# Patient Record
Sex: MALE | Race: WHITE | NOT HISPANIC OR LATINO | Employment: OTHER | URBAN - METROPOLITAN AREA
[De-identification: names, ages, dates, MRNs, and addresses within clinical notes are randomized per-mention and may not be internally consistent; named-entity substitution may affect disease eponyms.]

---

## 2021-01-01 ENCOUNTER — HOSPITAL ENCOUNTER (OUTPATIENT)
Facility: HOSPITAL | Age: 51
Discharge: HOME | End: 2021-07-29
Attending: EMERGENCY MEDICINE | Admitting: INTERNAL MEDICINE

## 2021-01-01 ENCOUNTER — OFFICE VISIT (OUTPATIENT)
Dept: GASTROENTEROLOGY | Facility: CLINIC | Age: 51
End: 2021-01-01

## 2021-01-01 ENCOUNTER — ANESTHESIA (OUTPATIENT)
Dept: GASTROENTEROLOGY | Facility: HOSPITAL | Age: 51
End: 2021-01-01

## 2021-01-01 ENCOUNTER — OFFICE VISIT (OUTPATIENT)
Dept: CARDIOLOGY | Facility: CLINIC | Age: 51
End: 2021-01-01

## 2021-01-01 ENCOUNTER — APPOINTMENT (OUTPATIENT)
Dept: CT IMAGING | Facility: HOSPITAL | Age: 51
End: 2021-01-01

## 2021-01-01 ENCOUNTER — APPOINTMENT (OUTPATIENT)
Dept: GENERAL RADIOLOGY | Facility: HOSPITAL | Age: 51
End: 2021-01-01

## 2021-01-01 ENCOUNTER — ANESTHESIA EVENT (OUTPATIENT)
Dept: GASTROENTEROLOGY | Facility: HOSPITAL | Age: 51
End: 2021-01-01

## 2021-01-01 ENCOUNTER — OUTSIDE FACILITY SERVICE (OUTPATIENT)
Dept: GASTROENTEROLOGY | Facility: CLINIC | Age: 51
End: 2021-01-01

## 2021-01-01 ENCOUNTER — TELEPHONE (OUTPATIENT)
Dept: GASTROENTEROLOGY | Facility: CLINIC | Age: 51
End: 2021-01-01

## 2021-01-01 ENCOUNTER — TELEPHONE (OUTPATIENT)
Dept: CARDIOLOGY | Facility: CLINIC | Age: 51
End: 2021-01-01

## 2021-01-01 ENCOUNTER — HOSPITAL ENCOUNTER (EMERGENCY)
Facility: HOSPITAL | Age: 51
Discharge: LEFT BEFORE DISCHARGE | End: 2021-08-10

## 2021-01-01 VITALS
HEART RATE: 95 BPM | OXYGEN SATURATION: 87 % | DIASTOLIC BLOOD PRESSURE: 87 MMHG | SYSTOLIC BLOOD PRESSURE: 122 MMHG | HEIGHT: 67 IN | BODY MASS INDEX: 27.47 KG/M2 | RESPIRATION RATE: 18 BRPM | TEMPERATURE: 99.3 F | WEIGHT: 175 LBS

## 2021-01-01 VITALS
RESPIRATION RATE: 12 BRPM | OXYGEN SATURATION: 99 % | DIASTOLIC BLOOD PRESSURE: 70 MMHG | BODY MASS INDEX: 28.88 KG/M2 | HEIGHT: 67 IN | WEIGHT: 184 LBS | SYSTOLIC BLOOD PRESSURE: 117 MMHG

## 2021-01-01 VITALS
TEMPERATURE: 99 F | BODY MASS INDEX: 27.47 KG/M2 | RESPIRATION RATE: 12 BRPM | HEART RATE: 86 BPM | WEIGHT: 175 LBS | SYSTOLIC BLOOD PRESSURE: 124 MMHG | OXYGEN SATURATION: 99 % | HEIGHT: 67 IN | DIASTOLIC BLOOD PRESSURE: 78 MMHG

## 2021-01-01 VITALS
SYSTOLIC BLOOD PRESSURE: 122 MMHG | WEIGHT: 185.4 LBS | BODY MASS INDEX: 29.1 KG/M2 | HEART RATE: 90 BPM | OXYGEN SATURATION: 98 % | HEIGHT: 67 IN | DIASTOLIC BLOOD PRESSURE: 66 MMHG | TEMPERATURE: 97.7 F

## 2021-01-01 VITALS
RESPIRATION RATE: 16 BRPM | DIASTOLIC BLOOD PRESSURE: 69 MMHG | TEMPERATURE: 98.8 F | BODY MASS INDEX: 27.15 KG/M2 | HEIGHT: 67 IN | SYSTOLIC BLOOD PRESSURE: 119 MMHG | HEART RATE: 72 BPM | WEIGHT: 173 LBS | OXYGEN SATURATION: 93 %

## 2021-01-01 DIAGNOSIS — E78.5 HYPERLIPIDEMIA LDL GOAL <70: ICD-10-CM

## 2021-01-01 DIAGNOSIS — K29.80 DUODENITIS: ICD-10-CM

## 2021-01-01 DIAGNOSIS — Z12.11 SCREEN FOR COLON CANCER: ICD-10-CM

## 2021-01-01 DIAGNOSIS — R11.2 INTRACTABLE NAUSEA AND VOMITING: ICD-10-CM

## 2021-01-01 DIAGNOSIS — R10.9 INTRACTABLE ABDOMINAL PAIN: ICD-10-CM

## 2021-01-01 DIAGNOSIS — K27.9 PUD (PEPTIC ULCER DISEASE): Primary | ICD-10-CM

## 2021-01-01 DIAGNOSIS — Z12.11 ENCOUNTER FOR SCREENING COLONOSCOPY: ICD-10-CM

## 2021-01-01 DIAGNOSIS — Z72.0 TOBACCO USE: ICD-10-CM

## 2021-01-01 DIAGNOSIS — E11.9 TYPE 2 DIABETES MELLITUS WITHOUT COMPLICATION, WITHOUT LONG-TERM CURRENT USE OF INSULIN (HCC): ICD-10-CM

## 2021-01-01 DIAGNOSIS — I73.9 PVD (PERIPHERAL VASCULAR DISEASE) WITH CLAUDICATION (HCC): ICD-10-CM

## 2021-01-01 DIAGNOSIS — R07.9 CHEST PAIN AT REST: ICD-10-CM

## 2021-01-01 DIAGNOSIS — I73.9 PERIPHERAL VASCULAR DISEASE OF EXTREMITY WITH CLAUDICATION (HCC): Primary | ICD-10-CM

## 2021-01-01 DIAGNOSIS — R73.9 HYPERGLYCEMIA: ICD-10-CM

## 2021-01-01 DIAGNOSIS — I10 ESSENTIAL HYPERTENSION: Primary | ICD-10-CM

## 2021-01-01 DIAGNOSIS — K59.04 CHRONIC IDIOPATHIC CONSTIPATION: ICD-10-CM

## 2021-01-01 DIAGNOSIS — R10.9 INTRACTABLE ABDOMINAL PAIN: Primary | ICD-10-CM

## 2021-01-01 DIAGNOSIS — Z12.11 SCREENING FOR COLON CANCER: Primary | ICD-10-CM

## 2021-01-01 DIAGNOSIS — I10 ESSENTIAL HYPERTENSION: ICD-10-CM

## 2021-01-01 LAB
ALBUMIN SERPL-MCNC: 3.2 G/DL (ref 3.5–5.2)
ALBUMIN SERPL-MCNC: 4.2 G/DL (ref 3.5–5.2)
ALBUMIN SERPL-MCNC: 4.2 G/DL (ref 3.5–5.2)
ALBUMIN/GLOB SERPL: 1.2 G/DL
ALBUMIN/GLOB SERPL: 1.3 G/DL
ALBUMIN/GLOB SERPL: 1.4 G/DL
ALP SERPL-CCNC: 129 U/L (ref 39–117)
ALP SERPL-CCNC: 136 U/L (ref 39–117)
ALP SERPL-CCNC: 169 U/L (ref 39–117)
ALT SERPL W P-5'-P-CCNC: 18 U/L (ref 1–41)
ALT SERPL W P-5'-P-CCNC: 25 U/L (ref 1–41)
ALT SERPL W P-5'-P-CCNC: 27 U/L (ref 1–41)
AMPHET+METHAMPHET UR QL: NEGATIVE
AMPHETAMINES UR QL: NEGATIVE
ANION GAP SERPL CALCULATED.3IONS-SCNC: 14 MMOL/L (ref 5–15)
ANION GAP SERPL CALCULATED.3IONS-SCNC: 17 MMOL/L (ref 5–15)
ANION GAP SERPL CALCULATED.3IONS-SCNC: 8 MMOL/L (ref 5–15)
ANION GAP SERPL CALCULATED.3IONS-SCNC: 9 MMOL/L (ref 5–15)
AST SERPL-CCNC: 21 U/L (ref 1–40)
AST SERPL-CCNC: 23 U/L (ref 1–40)
AST SERPL-CCNC: 36 U/L (ref 1–40)
B-OH-BUTYR SERPL-SCNC: 1.93 MMOL/L (ref 0.02–0.27)
BACTERIA UR QL AUTO: NORMAL /HPF
BARBITURATES UR QL SCN: NEGATIVE
BASOPHILS # BLD AUTO: 0.01 10*3/MM3 (ref 0–0.2)
BASOPHILS # BLD AUTO: 0.02 10*3/MM3 (ref 0–0.2)
BASOPHILS # BLD AUTO: 0.02 10*3/MM3 (ref 0–0.2)
BASOPHILS NFR BLD AUTO: 0.1 % (ref 0–1.5)
BASOPHILS NFR BLD AUTO: 0.2 % (ref 0–1.5)
BASOPHILS NFR BLD AUTO: 0.2 % (ref 0–1.5)
BENZODIAZ UR QL SCN: NEGATIVE
BILIRUB SERPL-MCNC: 0.4 MG/DL (ref 0–1.2)
BILIRUB SERPL-MCNC: 0.5 MG/DL (ref 0–1.2)
BILIRUB SERPL-MCNC: 0.7 MG/DL (ref 0–1.2)
BILIRUB UR QL STRIP: NEGATIVE
BUN SERPL-MCNC: 10 MG/DL (ref 6–20)
BUN SERPL-MCNC: 11 MG/DL (ref 6–20)
BUN SERPL-MCNC: 19 MG/DL (ref 6–20)
BUN SERPL-MCNC: 8 MG/DL (ref 6–20)
BUN/CREAT SERPL: 14.1 (ref 7–25)
BUN/CREAT SERPL: 16.3 (ref 7–25)
BUN/CREAT SERPL: 19.6 (ref 7–25)
BUN/CREAT SERPL: 29.7 (ref 7–25)
BUPRENORPHINE SERPL-MCNC: NEGATIVE NG/ML
CALCIUM SPEC-SCNC: 8 MG/DL (ref 8.6–10.5)
CALCIUM SPEC-SCNC: 8.3 MG/DL (ref 8.6–10.5)
CALCIUM SPEC-SCNC: 9.1 MG/DL (ref 8.6–10.5)
CALCIUM SPEC-SCNC: 9.1 MG/DL (ref 8.6–10.5)
CANNABINOIDS SERPL QL: NEGATIVE
CHLORIDE SERPL-SCNC: 102 MMOL/L (ref 98–107)
CHLORIDE SERPL-SCNC: 103 MMOL/L (ref 98–107)
CHLORIDE SERPL-SCNC: 92 MMOL/L (ref 98–107)
CHLORIDE SERPL-SCNC: 97 MMOL/L (ref 98–107)
CLARITY UR: CLEAR
CO2 SERPL-SCNC: 22 MMOL/L (ref 22–29)
CO2 SERPL-SCNC: 23 MMOL/L (ref 22–29)
CO2 SERPL-SCNC: 26 MMOL/L (ref 22–29)
CO2 SERPL-SCNC: 27 MMOL/L (ref 22–29)
COCAINE UR QL: NEGATIVE
COLOR UR: YELLOW
CREAT SERPL-MCNC: 0.49 MG/DL (ref 0.76–1.27)
CREAT SERPL-MCNC: 0.56 MG/DL (ref 0.76–1.27)
CREAT SERPL-MCNC: 0.64 MG/DL (ref 0.76–1.27)
CREAT SERPL-MCNC: 0.71 MG/DL (ref 0.76–1.27)
CYTO UR: NORMAL
D-LACTATE SERPL-SCNC: 1.6 MMOL/L (ref 0.5–2)
DEPRECATED RDW RBC AUTO: 43.3 FL (ref 37–54)
DEPRECATED RDW RBC AUTO: 43.9 FL (ref 37–54)
DEPRECATED RDW RBC AUTO: 45.1 FL (ref 37–54)
DEPRECATED RDW RBC AUTO: 46.3 FL (ref 37–54)
EOSINOPHIL # BLD AUTO: 0 10*3/MM3 (ref 0–0.4)
EOSINOPHIL # BLD AUTO: 0 10*3/MM3 (ref 0–0.4)
EOSINOPHIL # BLD AUTO: 0.1 10*3/MM3 (ref 0–0.4)
EOSINOPHIL NFR BLD AUTO: 0 % (ref 0.3–6.2)
EOSINOPHIL NFR BLD AUTO: 0 % (ref 0.3–6.2)
EOSINOPHIL NFR BLD AUTO: 0.9 % (ref 0.3–6.2)
ERYTHROCYTE [DISTWIDTH] IN BLOOD BY AUTOMATED COUNT: 12.6 % (ref 12.3–15.4)
ERYTHROCYTE [DISTWIDTH] IN BLOOD BY AUTOMATED COUNT: 12.6 % (ref 12.3–15.4)
ERYTHROCYTE [DISTWIDTH] IN BLOOD BY AUTOMATED COUNT: 12.7 % (ref 12.3–15.4)
ERYTHROCYTE [DISTWIDTH] IN BLOOD BY AUTOMATED COUNT: 12.9 % (ref 12.3–15.4)
FLUAV RNA RESP QL NAA+PROBE: NOT DETECTED
FLUBV RNA RESP QL NAA+PROBE: NOT DETECTED
GFR SERPL CREATININE-BSD FRML MDRD: 117 ML/MIN/1.73
GFR SERPL CREATININE-BSD FRML MDRD: 132 ML/MIN/1.73
GFR SERPL CREATININE-BSD FRML MDRD: >150 ML/MIN/1.73
GFR SERPL CREATININE-BSD FRML MDRD: >150 ML/MIN/1.73
GLOBULIN UR ELPH-MCNC: 2.4 GM/DL
GLOBULIN UR ELPH-MCNC: 3 GM/DL
GLOBULIN UR ELPH-MCNC: 3.5 GM/DL
GLUCOSE BLDC GLUCOMTR-MCNC: 113 MG/DL (ref 70–130)
GLUCOSE BLDC GLUCOMTR-MCNC: 114 MG/DL (ref 70–130)
GLUCOSE BLDC GLUCOMTR-MCNC: 142 MG/DL (ref 70–130)
GLUCOSE BLDC GLUCOMTR-MCNC: 155 MG/DL (ref 70–130)
GLUCOSE BLDC GLUCOMTR-MCNC: 169 MG/DL (ref 70–130)
GLUCOSE BLDC GLUCOMTR-MCNC: 174 MG/DL (ref 70–130)
GLUCOSE BLDC GLUCOMTR-MCNC: 175 MG/DL (ref 70–130)
GLUCOSE BLDC GLUCOMTR-MCNC: 177 MG/DL (ref 70–130)
GLUCOSE BLDC GLUCOMTR-MCNC: 193 MG/DL (ref 70–130)
GLUCOSE BLDC GLUCOMTR-MCNC: 209 MG/DL (ref 70–130)
GLUCOSE BLDC GLUCOMTR-MCNC: 237 MG/DL (ref 70–130)
GLUCOSE SERPL-MCNC: 139 MG/DL (ref 65–99)
GLUCOSE SERPL-MCNC: 161 MG/DL (ref 65–99)
GLUCOSE SERPL-MCNC: 171 MG/DL (ref 65–99)
GLUCOSE SERPL-MCNC: 295 MG/DL (ref 65–99)
GLUCOSE UR STRIP-MCNC: ABNORMAL MG/DL
HBA1C MFR BLD: 12.5 % (ref 4.8–5.6)
HCT VFR BLD AUTO: 40 % (ref 37.5–51)
HCT VFR BLD AUTO: 41.6 % (ref 37.5–51)
HCT VFR BLD AUTO: 43.7 % (ref 37.5–51)
HCT VFR BLD AUTO: 47.2 % (ref 37.5–51)
HCT VFR BLD AUTO: 52.7 % (ref 37.5–51)
HGB BLD-MCNC: 13.4 G/DL (ref 13–17.7)
HGB BLD-MCNC: 14.4 G/DL (ref 13–17.7)
HGB BLD-MCNC: 14.5 G/DL (ref 13–17.7)
HGB BLD-MCNC: 16.6 G/DL (ref 13–17.7)
HGB BLD-MCNC: 18.2 G/DL (ref 13–17.7)
HGB UR QL STRIP.AUTO: NEGATIVE
HOLD SPECIMEN: NORMAL
HYALINE CASTS UR QL AUTO: NORMAL /LPF
IMM GRANULOCYTES # BLD AUTO: 0.02 10*3/MM3 (ref 0–0.05)
IMM GRANULOCYTES # BLD AUTO: 0.05 10*3/MM3 (ref 0–0.05)
IMM GRANULOCYTES # BLD AUTO: 0.05 10*3/MM3 (ref 0–0.05)
IMM GRANULOCYTES NFR BLD AUTO: 0.3 % (ref 0–0.5)
IMM GRANULOCYTES NFR BLD AUTO: 0.4 % (ref 0–0.5)
IMM GRANULOCYTES NFR BLD AUTO: 0.5 % (ref 0–0.5)
KETONES UR QL STRIP: ABNORMAL
LAB AP CASE REPORT: NORMAL
LAB AP CLINICAL INFORMATION: NORMAL
LAB AP SPECIAL STAINS: NORMAL
LEUKOCYTE ESTERASE UR QL STRIP.AUTO: NEGATIVE
LIPASE SERPL-CCNC: 20 U/L (ref 13–60)
LIPASE SERPL-CCNC: 24 U/L (ref 13–60)
LYMPHOCYTES # BLD AUTO: 1.51 10*3/MM3 (ref 0.7–3.1)
LYMPHOCYTES # BLD AUTO: 1.7 10*3/MM3 (ref 0.7–3.1)
LYMPHOCYTES # BLD AUTO: 2.5 10*3/MM3 (ref 0.7–3.1)
LYMPHOCYTES NFR BLD AUTO: 16.4 % (ref 19.6–45.3)
LYMPHOCYTES NFR BLD AUTO: 22 % (ref 19.6–45.3)
LYMPHOCYTES NFR BLD AUTO: 22.4 % (ref 19.6–45.3)
MAGNESIUM SERPL-MCNC: 2 MG/DL (ref 1.6–2.6)
MAGNESIUM SERPL-MCNC: 2.1 MG/DL (ref 1.6–2.6)
MCH RBC QN AUTO: 32.2 PG (ref 26.6–33)
MCH RBC QN AUTO: 32.5 PG (ref 26.6–33)
MCH RBC QN AUTO: 32.6 PG (ref 26.6–33)
MCH RBC QN AUTO: 33.1 PG (ref 26.6–33)
MCHC RBC AUTO-ENTMCNC: 33.2 G/DL (ref 31.5–35.7)
MCHC RBC AUTO-ENTMCNC: 33.5 G/DL (ref 31.5–35.7)
MCHC RBC AUTO-ENTMCNC: 34.5 G/DL (ref 31.5–35.7)
MCHC RBC AUTO-ENTMCNC: 35.2 G/DL (ref 31.5–35.7)
MCV RBC AUTO: 94.2 FL (ref 79–97)
MCV RBC AUTO: 94.4 FL (ref 79–97)
MCV RBC AUTO: 96.2 FL (ref 79–97)
MCV RBC AUTO: 98 FL (ref 79–97)
METHADONE UR QL SCN: NEGATIVE
MONOCYTES # BLD AUTO: 0.49 10*3/MM3 (ref 0.1–0.9)
MONOCYTES # BLD AUTO: 0.7 10*3/MM3 (ref 0.1–0.9)
MONOCYTES # BLD AUTO: 0.81 10*3/MM3 (ref 0.1–0.9)
MONOCYTES NFR BLD AUTO: 6.3 % (ref 5–12)
MONOCYTES NFR BLD AUTO: 7.1 % (ref 5–12)
MONOCYTES NFR BLD AUTO: 7.8 % (ref 5–12)
NEUTROPHILS NFR BLD AUTO: 4.84 10*3/MM3 (ref 1.7–7)
NEUTROPHILS NFR BLD AUTO: 69.8 % (ref 42.7–76)
NEUTROPHILS NFR BLD AUTO: 7.76 10*3/MM3 (ref 1.7–7)
NEUTROPHILS NFR BLD AUTO: 7.8 10*3/MM3 (ref 1.7–7)
NEUTROPHILS NFR BLD AUTO: 70.5 % (ref 42.7–76)
NEUTROPHILS NFR BLD AUTO: 75.1 % (ref 42.7–76)
NITRITE UR QL STRIP: NEGATIVE
NRBC BLD AUTO-RTO: 0 /100 WBC (ref 0–0.2)
OPIATES UR QL: NEGATIVE
OXYCODONE UR QL SCN: NEGATIVE
PATH REPORT.FINAL DX SPEC: NORMAL
PATH REPORT.GROSS SPEC: NORMAL
PCP UR QL SCN: NEGATIVE
PH UR STRIP.AUTO: 5.5 [PH] (ref 5–8)
PLAT MORPH BLD: NORMAL
PLATELET # BLD AUTO: 178 10*3/MM3 (ref 140–450)
PLATELET # BLD AUTO: 179 10*3/MM3 (ref 140–450)
PLATELET # BLD AUTO: 255 10*3/MM3 (ref 140–450)
PLATELET # BLD AUTO: 313 10*3/MM3 (ref 140–450)
PMV BLD AUTO: 9.6 FL (ref 6–12)
PMV BLD AUTO: 9.6 FL (ref 6–12)
PMV BLD AUTO: 9.7 FL (ref 6–12)
PMV BLD AUTO: 9.8 FL (ref 6–12)
POTASSIUM SERPL-SCNC: 3.8 MMOL/L (ref 3.5–5.2)
POTASSIUM SERPL-SCNC: 4.1 MMOL/L (ref 3.5–5.2)
POTASSIUM SERPL-SCNC: 4.1 MMOL/L (ref 3.5–5.2)
POTASSIUM SERPL-SCNC: 4.3 MMOL/L (ref 3.5–5.2)
PROPOXYPH UR QL: NEGATIVE
PROT SERPL-MCNC: 5.6 G/DL (ref 6–8.5)
PROT SERPL-MCNC: 7.2 G/DL (ref 6–8.5)
PROT SERPL-MCNC: 7.7 G/DL (ref 6–8.5)
PROT UR QL STRIP: ABNORMAL
QT INTERVAL: 354 MS
QTC INTERVAL: 435 MS
RBC # BLD AUTO: 4.16 10*6/MM3 (ref 4.14–5.8)
RBC # BLD AUTO: 4.46 10*6/MM3 (ref 4.14–5.8)
RBC # BLD AUTO: 5.01 10*6/MM3 (ref 4.14–5.8)
RBC # BLD AUTO: 5.58 10*6/MM3 (ref 4.14–5.8)
RBC # UR: NORMAL /HPF
RBC MORPH BLD: NORMAL
REF LAB TEST METHOD: NORMAL
SALICYLATES SERPL-MCNC: <0.3 MG/DL
SARS-COV-2 RNA RESP QL NAA+PROBE: NOT DETECTED
SODIUM SERPL-SCNC: 131 MMOL/L (ref 136–145)
SODIUM SERPL-SCNC: 134 MMOL/L (ref 136–145)
SODIUM SERPL-SCNC: 137 MMOL/L (ref 136–145)
SODIUM SERPL-SCNC: 138 MMOL/L (ref 136–145)
SP GR UR STRIP: 1.05 (ref 1–1.03)
SQUAMOUS #/AREA URNS HPF: NORMAL /HPF
TRICYCLICS UR QL SCN: POSITIVE
TROPONIN T SERPL-MCNC: <0.01 NG/ML (ref 0–0.03)
UROBILINOGEN UR QL STRIP: ABNORMAL
WBC # BLD AUTO: 10.34 10*3/MM3 (ref 3.4–10.8)
WBC # BLD AUTO: 11.17 10*3/MM3 (ref 3.4–10.8)
WBC # BLD AUTO: 5.07 10*3/MM3 (ref 3.4–10.8)
WBC # BLD AUTO: 6.87 10*3/MM3 (ref 3.4–10.8)
WBC MORPH BLD: NORMAL
WBC UR QL AUTO: NORMAL /HPF
WHOLE BLOOD HOLD SPECIMEN: NORMAL
WHOLE BLOOD HOLD SPECIMEN: NORMAL

## 2021-01-01 PROCEDURE — 96375 TX/PRO/DX INJ NEW DRUG ADDON: CPT

## 2021-01-01 PROCEDURE — 94640 AIRWAY INHALATION TREATMENT: CPT

## 2021-01-01 PROCEDURE — 74177 CT ABD & PELVIS W/CONTRAST: CPT

## 2021-01-01 PROCEDURE — 25010000002 HYDROMORPHONE PER 4 MG: Performed by: EMERGENCY MEDICINE

## 2021-01-01 PROCEDURE — 85025 COMPLETE CBC W/AUTO DIFF WBC: CPT | Performed by: INTERNAL MEDICINE

## 2021-01-01 PROCEDURE — C9803 HOPD COVID-19 SPEC COLLECT: HCPCS

## 2021-01-01 PROCEDURE — 63710000001 INSULIN LISPRO (HUMAN) PER 5 UNITS: Performed by: INTERNAL MEDICINE

## 2021-01-01 PROCEDURE — 93000 ELECTROCARDIOGRAM COMPLETE: CPT | Performed by: INTERNAL MEDICINE

## 2021-01-01 PROCEDURE — G0378 HOSPITAL OBSERVATION PER HR: HCPCS

## 2021-01-01 PROCEDURE — 82962 GLUCOSE BLOOD TEST: CPT

## 2021-01-01 PROCEDURE — 25010000002 ONDANSETRON PER 1 MG: Performed by: EMERGENCY MEDICINE

## 2021-01-01 PROCEDURE — 99220 PR INITIAL OBSERVATION CARE/DAY 70 MINUTES: CPT | Performed by: INTERNAL MEDICINE

## 2021-01-01 PROCEDURE — 94799 UNLISTED PULMONARY SVC/PX: CPT

## 2021-01-01 PROCEDURE — 88342 IMHCHEM/IMCYTCHM 1ST ANTB: CPT | Performed by: INTERNAL MEDICINE

## 2021-01-01 PROCEDURE — 99204 OFFICE O/P NEW MOD 45 MIN: CPT | Performed by: PHYSICIAN ASSISTANT

## 2021-01-01 PROCEDURE — 99211 OFF/OP EST MAY X REQ PHY/QHP: CPT

## 2021-01-01 PROCEDURE — 85025 COMPLETE CBC W/AUTO DIFF WBC: CPT

## 2021-01-01 PROCEDURE — 80306 DRUG TEST PRSMV INSTRMNT: CPT | Performed by: INTERNAL MEDICINE

## 2021-01-01 PROCEDURE — 83735 ASSAY OF MAGNESIUM: CPT | Performed by: INTERNAL MEDICINE

## 2021-01-01 PROCEDURE — 25010000002 MORPHINE PER 10 MG: Performed by: INTERNAL MEDICINE

## 2021-01-01 PROCEDURE — 80048 BASIC METABOLIC PNL TOTAL CA: CPT | Performed by: INTERNAL MEDICINE

## 2021-01-01 PROCEDURE — 85027 COMPLETE CBC AUTOMATED: CPT | Performed by: INTERNAL MEDICINE

## 2021-01-01 PROCEDURE — 99285 EMERGENCY DEPT VISIT HI MDM: CPT

## 2021-01-01 PROCEDURE — 99212 OFFICE O/P EST SF 10 MIN: CPT | Performed by: PHYSICIAN ASSISTANT

## 2021-01-01 PROCEDURE — 99225 PR SBSQ OBSERVATION CARE/DAY 25 MINUTES: CPT | Performed by: INTERNAL MEDICINE

## 2021-01-01 PROCEDURE — 25010000002 IOPAMIDOL 61 % SOLUTION: Performed by: EMERGENCY MEDICINE

## 2021-01-01 PROCEDURE — 83036 HEMOGLOBIN GLYCOSYLATED A1C: CPT | Performed by: INTERNAL MEDICINE

## 2021-01-01 PROCEDURE — 85007 BL SMEAR W/DIFF WBC COUNT: CPT

## 2021-01-01 PROCEDURE — 99217 PR OBSERVATION CARE DISCHARGE MANAGEMENT: CPT | Performed by: INTERNAL MEDICINE

## 2021-01-01 PROCEDURE — 25010000002 PROCHLORPERAZINE 10 MG/2ML SOLUTION: Performed by: NURSE PRACTITIONER

## 2021-01-01 PROCEDURE — 99214 OFFICE O/P EST MOD 30 MIN: CPT | Performed by: INTERNAL MEDICINE

## 2021-01-01 PROCEDURE — 99443 PR PHYS/QHP TELEPHONE EVALUATION 21-30 MIN: CPT | Performed by: INTERNAL MEDICINE

## 2021-01-01 PROCEDURE — 80053 COMPREHEN METABOLIC PANEL: CPT | Performed by: INTERNAL MEDICINE

## 2021-01-01 PROCEDURE — 63710000001 INSULIN DETEMIR PER 5 UNITS: Performed by: INTERNAL MEDICINE

## 2021-01-01 PROCEDURE — 80053 COMPREHEN METABOLIC PANEL: CPT

## 2021-01-01 PROCEDURE — 84484 ASSAY OF TROPONIN QUANT: CPT

## 2021-01-01 PROCEDURE — 96376 TX/PRO/DX INJ SAME DRUG ADON: CPT

## 2021-01-01 PROCEDURE — 83690 ASSAY OF LIPASE: CPT

## 2021-01-01 PROCEDURE — 93005 ELECTROCARDIOGRAM TRACING: CPT

## 2021-01-01 PROCEDURE — 43239 EGD BIOPSY SINGLE/MULTIPLE: CPT | Performed by: INTERNAL MEDICINE

## 2021-01-01 PROCEDURE — 87636 SARSCOV2 & INF A&B AMP PRB: CPT | Performed by: NURSE PRACTITIONER

## 2021-01-01 PROCEDURE — 71045 X-RAY EXAM CHEST 1 VIEW: CPT

## 2021-01-01 PROCEDURE — 83605 ASSAY OF LACTIC ACID: CPT

## 2021-01-01 PROCEDURE — 63710000001 INSULIN REGULAR HUMAN PER 5 UNITS: Performed by: NURSE PRACTITIONER

## 2021-01-01 PROCEDURE — 81001 URINALYSIS AUTO W/SCOPE: CPT | Performed by: EMERGENCY MEDICINE

## 2021-01-01 PROCEDURE — 96374 THER/PROPH/DIAG INJ IV PUSH: CPT

## 2021-01-01 PROCEDURE — 25010000002 PROPOFOL 10 MG/ML EMULSION: Performed by: NURSE ANESTHETIST, CERTIFIED REGISTERED

## 2021-01-01 PROCEDURE — 82010 KETONE BODYS QUAN: CPT | Performed by: NURSE PRACTITIONER

## 2021-01-01 PROCEDURE — 80179 DRUG ASSAY SALICYLATE: CPT | Performed by: INTERNAL MEDICINE

## 2021-01-01 PROCEDURE — 99214 OFFICE O/P EST MOD 30 MIN: CPT | Performed by: NURSE PRACTITIONER

## 2021-01-01 PROCEDURE — 88305 TISSUE EXAM BY PATHOLOGIST: CPT | Performed by: INTERNAL MEDICINE

## 2021-01-01 PROCEDURE — 85014 HEMATOCRIT: CPT | Performed by: INTERNAL MEDICINE

## 2021-01-01 PROCEDURE — 45385 COLONOSCOPY W/LESION REMOVAL: CPT | Performed by: INTERNAL MEDICINE

## 2021-01-01 PROCEDURE — 85018 HEMOGLOBIN: CPT | Performed by: INTERNAL MEDICINE

## 2021-01-01 RX ORDER — SUCRALFATE 1 G/1
1 TABLET ORAL
Status: DISCONTINUED | OUTPATIENT
Start: 2021-01-01 | End: 2021-01-01 | Stop reason: HOSPADM

## 2021-01-01 RX ORDER — INSULIN GLARGINE 100 [IU]/ML
30 INJECTION, SOLUTION SUBCUTANEOUS 2 TIMES DAILY
COMMUNITY
Start: 2021-01-01 | End: 2022-01-01 | Stop reason: HOSPADM

## 2021-01-01 RX ORDER — PROCHLORPERAZINE EDISYLATE 5 MG/ML
10 INJECTION INTRAMUSCULAR; INTRAVENOUS ONCE
Status: COMPLETED | OUTPATIENT
Start: 2021-01-01 | End: 2021-01-01

## 2021-01-01 RX ORDER — SUCRALFATE 1 G/1
1 TABLET ORAL
Qty: 60 TABLET | Refills: 0 | Status: SHIPPED | OUTPATIENT
Start: 2021-01-01 | End: 2021-01-01 | Stop reason: SDUPTHER

## 2021-01-01 RX ORDER — EMPAGLIFLOZIN 10 MG/1
10 TABLET, FILM COATED ORAL DAILY
COMMUNITY
End: 2021-01-01

## 2021-01-01 RX ORDER — LANCETS 30 GAUGE
EACH MISCELLANEOUS
COMMUNITY
Start: 2021-05-10 | End: 2021-01-01

## 2021-01-01 RX ORDER — SODIUM CHLORIDE 9 MG/ML
75 INJECTION, SOLUTION INTRAVENOUS CONTINUOUS
Status: DISCONTINUED | OUTPATIENT
Start: 2021-01-01 | End: 2021-01-01

## 2021-01-01 RX ORDER — PANTOPRAZOLE SODIUM 40 MG/10ML
80 INJECTION, POWDER, LYOPHILIZED, FOR SOLUTION INTRAVENOUS ONCE
Status: DISCONTINUED | OUTPATIENT
Start: 2021-01-01 | End: 2021-01-01

## 2021-01-01 RX ORDER — SODIUM CHLORIDE, SODIUM LACTATE, POTASSIUM CHLORIDE, AND CALCIUM CHLORIDE .6; .31; .03; .02 G/100ML; G/100ML; G/100ML; G/100ML
10 INJECTION, SOLUTION INTRAVENOUS ONCE
Status: CANCELLED | OUTPATIENT
Start: 2021-01-01 | End: 2021-01-01

## 2021-01-01 RX ORDER — FLUOXETINE HYDROCHLORIDE 20 MG/1
20 CAPSULE ORAL EVERY MORNING
Status: DISCONTINUED | OUTPATIENT
Start: 2021-01-01 | End: 2021-01-01 | Stop reason: HOSPADM

## 2021-01-01 RX ORDER — SODIUM CHLORIDE 0.9 % (FLUSH) 0.9 %
10 SYRINGE (ML) INJECTION AS NEEDED
Status: DISCONTINUED | OUTPATIENT
Start: 2021-01-01 | End: 2021-01-01 | Stop reason: HOSPADM

## 2021-01-01 RX ORDER — PROPOFOL 10 MG/ML
VIAL (ML) INTRAVENOUS AS NEEDED
Status: DISCONTINUED | OUTPATIENT
Start: 2021-01-01 | End: 2021-01-01 | Stop reason: SURG

## 2021-01-01 RX ORDER — ROSUVASTATIN CALCIUM 10 MG/1
10 TABLET, COATED ORAL NIGHTLY
COMMUNITY
End: 2022-01-01 | Stop reason: HOSPADM

## 2021-01-01 RX ORDER — MORPHINE SULFATE 2 MG/ML
1 INJECTION, SOLUTION INTRAMUSCULAR; INTRAVENOUS EVERY 4 HOURS PRN
Status: DISCONTINUED | OUTPATIENT
Start: 2021-01-01 | End: 2021-01-01 | Stop reason: HOSPADM

## 2021-01-01 RX ORDER — LIDOCAINE HYDROCHLORIDE 10 MG/ML
INJECTION, SOLUTION EPIDURAL; INFILTRATION; INTRACAUDAL; PERINEURAL AS NEEDED
Status: DISCONTINUED | OUTPATIENT
Start: 2021-01-01 | End: 2021-01-01 | Stop reason: SURG

## 2021-01-01 RX ORDER — IPRATROPIUM BROMIDE AND ALBUTEROL SULFATE 2.5; .5 MG/3ML; MG/3ML
3 SOLUTION RESPIRATORY (INHALATION) ONCE AS NEEDED
Status: DISCONTINUED | OUTPATIENT
Start: 2021-01-01 | End: 2021-01-01 | Stop reason: HOSPADM

## 2021-01-01 RX ORDER — HYDROMORPHONE HYDROCHLORIDE 1 MG/ML
0.5 INJECTION, SOLUTION INTRAMUSCULAR; INTRAVENOUS; SUBCUTANEOUS ONCE
Status: COMPLETED | OUTPATIENT
Start: 2021-01-01 | End: 2021-01-01

## 2021-01-01 RX ORDER — BUSPIRONE HYDROCHLORIDE 10 MG/1
30 TABLET ORAL EVERY 12 HOURS SCHEDULED
Status: DISCONTINUED | OUTPATIENT
Start: 2021-01-01 | End: 2021-01-01 | Stop reason: HOSPADM

## 2021-01-01 RX ORDER — HYDROXYZINE HYDROCHLORIDE 10 MG/1
10 TABLET, FILM COATED ORAL EVERY 8 HOURS PRN
Status: DISCONTINUED | OUTPATIENT
Start: 2021-01-01 | End: 2021-01-01 | Stop reason: HOSPADM

## 2021-01-01 RX ORDER — NORTRIPTYLINE HYDROCHLORIDE 10 MG/1
10 CAPSULE ORAL NIGHTLY
Status: DISCONTINUED | OUTPATIENT
Start: 2021-01-01 | End: 2021-01-01 | Stop reason: HOSPADM

## 2021-01-01 RX ORDER — QUETIAPINE FUMARATE 100 MG/1
300 TABLET, FILM COATED ORAL NIGHTLY
Status: DISCONTINUED | OUTPATIENT
Start: 2021-01-01 | End: 2021-01-01 | Stop reason: HOSPADM

## 2021-01-01 RX ORDER — MAGNESIUM SULFATE HEPTAHYDRATE 40 MG/ML
2 INJECTION, SOLUTION INTRAVENOUS AS NEEDED
Status: DISCONTINUED | OUTPATIENT
Start: 2021-01-01 | End: 2021-01-01 | Stop reason: HOSPADM

## 2021-01-01 RX ORDER — PANTOPRAZOLE SODIUM 40 MG/1
40 TABLET, DELAYED RELEASE ORAL 2 TIMES DAILY
Qty: 60 TABLET | Refills: 0 | Status: SHIPPED | OUTPATIENT
Start: 2021-01-01 | End: 2021-01-01 | Stop reason: SDUPTHER

## 2021-01-01 RX ORDER — SODIUM CHLORIDE 9 MG/ML
10 INJECTION INTRAVENOUS AS NEEDED
Status: DISCONTINUED | OUTPATIENT
Start: 2021-01-01 | End: 2021-01-01 | Stop reason: HOSPADM

## 2021-01-01 RX ORDER — SODIUM CHLORIDE 0.9 % (FLUSH) 0.9 %
10 SYRINGE (ML) INJECTION EVERY 12 HOURS SCHEDULED
Status: DISCONTINUED | OUTPATIENT
Start: 2021-01-01 | End: 2021-01-01 | Stop reason: HOSPADM

## 2021-01-01 RX ORDER — NALOXONE HCL 0.4 MG/ML
0.4 VIAL (ML) INJECTION
Status: DISCONTINUED | OUTPATIENT
Start: 2021-01-01 | End: 2021-01-01 | Stop reason: HOSPADM

## 2021-01-01 RX ORDER — BUDESONIDE AND FORMOTEROL FUMARATE DIHYDRATE 160; 4.5 UG/1; UG/1
2 AEROSOL RESPIRATORY (INHALATION)
Status: DISCONTINUED | OUTPATIENT
Start: 2021-01-01 | End: 2021-01-01 | Stop reason: HOSPADM

## 2021-01-01 RX ORDER — ROSUVASTATIN CALCIUM 10 MG/1
10 TABLET, COATED ORAL NIGHTLY
Status: DISCONTINUED | OUTPATIENT
Start: 2021-01-01 | End: 2021-01-01 | Stop reason: HOSPADM

## 2021-01-01 RX ORDER — PEN NEEDLE, DIABETIC 32GX 5/32"
NEEDLE, DISPOSABLE MISCELLANEOUS
COMMUNITY
Start: 2021-03-17 | End: 2021-01-01

## 2021-01-01 RX ORDER — MAGNESIUM SULFATE 1 G/100ML
1 INJECTION INTRAVENOUS AS NEEDED
Status: DISCONTINUED | OUTPATIENT
Start: 2021-01-01 | End: 2021-01-01 | Stop reason: HOSPADM

## 2021-01-01 RX ORDER — PANTOPRAZOLE SODIUM 40 MG/10ML
40 INJECTION, POWDER, LYOPHILIZED, FOR SOLUTION INTRAVENOUS EVERY 12 HOURS SCHEDULED
Status: DISCONTINUED | OUTPATIENT
Start: 2021-01-01 | End: 2021-01-01 | Stop reason: HOSPADM

## 2021-01-01 RX ORDER — ACETAMINOPHEN 325 MG/1
650 TABLET ORAL EVERY 4 HOURS PRN
Status: DISCONTINUED | OUTPATIENT
Start: 2021-01-01 | End: 2021-01-01 | Stop reason: HOSPADM

## 2021-01-01 RX ORDER — TIOTROPIUM BROMIDE 18 UG/1
1 CAPSULE ORAL; RESPIRATORY (INHALATION)
COMMUNITY
Start: 2021-04-15 | End: 2022-01-01 | Stop reason: HOSPADM

## 2021-01-01 RX ORDER — OMEPRAZOLE 40 MG/1
40 CAPSULE, DELAYED RELEASE ORAL DAILY
COMMUNITY
Start: 2021-05-10 | End: 2021-01-01 | Stop reason: HOSPADM

## 2021-01-01 RX ORDER — ASPIRIN 81 MG/1
81 TABLET ORAL DAILY
Qty: 90 TABLET | Refills: 3 | Status: SHIPPED | OUTPATIENT
Start: 2021-01-01 | End: 2022-01-01 | Stop reason: HOSPADM

## 2021-01-01 RX ORDER — ASPIRIN 81 MG/1
81 TABLET ORAL DAILY
Status: DISCONTINUED | OUTPATIENT
Start: 2021-01-01 | End: 2021-01-01 | Stop reason: HOSPADM

## 2021-01-01 RX ORDER — PIRFENIDONE 267 MG/1
267 CAPSULE ORAL
COMMUNITY
End: 2021-01-01 | Stop reason: HOSPADM

## 2021-01-01 RX ORDER — LISINOPRIL 20 MG/1
20 TABLET ORAL DAILY
COMMUNITY
Start: 2021-05-10 | End: 2022-01-01 | Stop reason: HOSPADM

## 2021-01-01 RX ORDER — METFORMIN HYDROCHLORIDE 500 MG/1
TABLET, EXTENDED RELEASE ORAL
COMMUNITY
Start: 2021-01-01 | End: 2022-01-01 | Stop reason: HOSPADM

## 2021-01-01 RX ORDER — MAGNESIUM SULFATE HEPTAHYDRATE 40 MG/ML
4 INJECTION, SOLUTION INTRAVENOUS AS NEEDED
Status: DISCONTINUED | OUTPATIENT
Start: 2021-01-01 | End: 2021-01-01 | Stop reason: HOSPADM

## 2021-01-01 RX ORDER — BUDESONIDE AND FORMOTEROL FUMARATE DIHYDRATE 160; 4.5 UG/1; UG/1
2 AEROSOL RESPIRATORY (INHALATION)
COMMUNITY
Start: 2021-04-15

## 2021-01-01 RX ORDER — DIVALPROEX SODIUM 500 MG/1
500 TABLET, DELAYED RELEASE ORAL 2 TIMES DAILY
Status: DISCONTINUED | OUTPATIENT
Start: 2021-01-01 | End: 2021-01-01 | Stop reason: HOSPADM

## 2021-01-01 RX ORDER — DAPAGLIFLOZIN 5 MG/1
5 TABLET, FILM COATED ORAL DAILY
COMMUNITY
End: 2022-01-01 | Stop reason: HOSPADM

## 2021-01-01 RX ORDER — SODIUM CHLORIDE, SODIUM LACTATE, POTASSIUM CHLORIDE, CALCIUM CHLORIDE 600; 310; 30; 20 MG/100ML; MG/100ML; MG/100ML; MG/100ML
20 INJECTION, SOLUTION INTRAVENOUS CONTINUOUS
Status: DISCONTINUED | OUTPATIENT
Start: 2021-01-01 | End: 2021-01-01

## 2021-01-01 RX ORDER — GABAPENTIN 100 MG/1
100 CAPSULE ORAL 3 TIMES DAILY
Status: ON HOLD | COMMUNITY
End: 2022-01-01

## 2021-01-01 RX ORDER — GABAPENTIN 100 MG/1
100 CAPSULE ORAL 3 TIMES DAILY
COMMUNITY
Start: 2021-04-28 | End: 2021-01-01

## 2021-01-01 RX ORDER — ONDANSETRON 2 MG/ML
4 INJECTION INTRAMUSCULAR; INTRAVENOUS ONCE
Status: COMPLETED | OUTPATIENT
Start: 2021-01-01 | End: 2021-01-01

## 2021-01-01 RX ORDER — NICOTINE POLACRILEX 4 MG
15 LOZENGE BUCCAL
Status: DISCONTINUED | OUTPATIENT
Start: 2021-01-01 | End: 2021-01-01 | Stop reason: HOSPADM

## 2021-01-01 RX ORDER — DEXTROSE MONOHYDRATE 25 G/50ML
25 INJECTION, SOLUTION INTRAVENOUS
Status: DISCONTINUED | OUTPATIENT
Start: 2021-01-01 | End: 2021-01-01 | Stop reason: HOSPADM

## 2021-01-01 RX ORDER — SUCRALFATE 1 G/1
1 TABLET ORAL
Qty: 120 TABLET | Refills: 1 | Status: SHIPPED | OUTPATIENT
Start: 2021-01-01 | End: 2022-01-01 | Stop reason: HOSPADM

## 2021-01-01 RX ORDER — PANTOPRAZOLE SODIUM 40 MG/1
40 TABLET, DELAYED RELEASE ORAL 2 TIMES DAILY
Qty: 60 TABLET | Refills: 5 | Status: SHIPPED | OUTPATIENT
Start: 2021-01-01 | End: 2022-01-01 | Stop reason: HOSPADM

## 2021-01-01 RX ORDER — FLUOXETINE HYDROCHLORIDE 40 MG/1
40 CAPSULE ORAL EVERY MORNING
COMMUNITY
Start: 2021-01-01

## 2021-01-01 RX ORDER — ONDANSETRON 2 MG/ML
4 INJECTION INTRAMUSCULAR; INTRAVENOUS ONCE AS NEEDED
Status: DISCONTINUED | OUTPATIENT
Start: 2021-01-01 | End: 2021-01-01 | Stop reason: HOSPADM

## 2021-01-01 RX ADMIN — NORTRIPTYLINE HYDROCHLORIDE 10 MG: 10 CAPSULE ORAL at 21:31

## 2021-01-01 RX ADMIN — SODIUM CHLORIDE, PRESERVATIVE FREE 10 ML: 5 INJECTION INTRAVENOUS at 21:02

## 2021-01-01 RX ADMIN — ASPIRIN 81 MG: 81 TABLET, COATED ORAL at 08:41

## 2021-01-01 RX ADMIN — INSULIN DETEMIR 5 UNITS: 100 INJECTION, SOLUTION SUBCUTANEOUS at 08:47

## 2021-01-01 RX ADMIN — QUETIAPINE FUMARATE 300 MG: 100 TABLET ORAL at 21:57

## 2021-01-01 RX ADMIN — DIVALPROEX SODIUM 500 MG: 500 TABLET, DELAYED RELEASE ORAL at 08:47

## 2021-01-01 RX ADMIN — BUDESONIDE AND FORMOTEROL FUMARATE DIHYDRATE 2 PUFF: 160; 4.5 AEROSOL RESPIRATORY (INHALATION) at 20:42

## 2021-01-01 RX ADMIN — BUSPIRONE HYDROCHLORIDE 30 MG: 10 TABLET ORAL at 09:40

## 2021-01-01 RX ADMIN — BUSPIRONE HYDROCHLORIDE 30 MG: 10 TABLET ORAL at 08:41

## 2021-01-01 RX ADMIN — FLUOXETINE HYDROCHLORIDE 20 MG: 20 CAPSULE ORAL at 06:34

## 2021-01-01 RX ADMIN — SODIUM CHLORIDE, POTASSIUM CHLORIDE, SODIUM LACTATE AND CALCIUM CHLORIDE 20 ML/HR: 600; 310; 30; 20 INJECTION, SOLUTION INTRAVENOUS at 15:16

## 2021-01-01 RX ADMIN — NORTRIPTYLINE HYDROCHLORIDE 10 MG: 10 CAPSULE ORAL at 21:57

## 2021-01-01 RX ADMIN — BUDESONIDE AND FORMOTEROL FUMARATE DIHYDRATE 2 PUFF: 160; 4.5 AEROSOL RESPIRATORY (INHALATION) at 06:56

## 2021-01-01 RX ADMIN — ROSUVASTATIN CALCIUM 10 MG: 10 TABLET, COATED ORAL at 21:02

## 2021-01-01 RX ADMIN — SODIUM CHLORIDE 100 ML/HR: 9 INJECTION, SOLUTION INTRAVENOUS at 21:58

## 2021-01-01 RX ADMIN — PROPOFOL 20 MG: 10 INJECTION, EMULSION INTRAVENOUS at 15:25

## 2021-01-01 RX ADMIN — PANTOPRAZOLE SODIUM 40 MG: 40 INJECTION, POWDER, FOR SOLUTION INTRAVENOUS at 09:40

## 2021-01-01 RX ADMIN — INSULIN LISPRO 3 UNITS: 100 INJECTION, SOLUTION INTRAVENOUS; SUBCUTANEOUS at 16:43

## 2021-01-01 RX ADMIN — BUSPIRONE HYDROCHLORIDE 30 MG: 10 TABLET ORAL at 21:31

## 2021-01-01 RX ADMIN — PANTOPRAZOLE SODIUM 40 MG: 40 INJECTION, POWDER, FOR SOLUTION INTRAVENOUS at 21:31

## 2021-01-01 RX ADMIN — HYDROMORPHONE HYDROCHLORIDE 0.5 MG: 1 INJECTION, SOLUTION INTRAMUSCULAR; INTRAVENOUS; SUBCUTANEOUS at 17:47

## 2021-01-01 RX ADMIN — ASPIRIN 81 MG: 81 TABLET, COATED ORAL at 09:40

## 2021-01-01 RX ADMIN — INSULIN LISPRO 2 UNITS: 100 INJECTION, SOLUTION INTRAVENOUS; SUBCUTANEOUS at 08:47

## 2021-01-01 RX ADMIN — PROCHLORPERAZINE EDISYLATE 10 MG: 5 INJECTION INTRAMUSCULAR; INTRAVENOUS at 17:47

## 2021-01-01 RX ADMIN — DIVALPROEX SODIUM 500 MG: 500 TABLET, DELAYED RELEASE ORAL at 09:40

## 2021-01-01 RX ADMIN — ROSUVASTATIN CALCIUM 10 MG: 10 TABLET, COATED ORAL at 21:57

## 2021-01-01 RX ADMIN — MORPHINE SULFATE 1 MG: 2 INJECTION, SOLUTION INTRAMUSCULAR; INTRAVENOUS at 04:53

## 2021-01-01 RX ADMIN — SODIUM CHLORIDE 75 ML/HR: 9 INJECTION, SOLUTION INTRAVENOUS at 17:52

## 2021-01-01 RX ADMIN — BUSPIRONE HYDROCHLORIDE 30 MG: 10 TABLET ORAL at 21:57

## 2021-01-01 RX ADMIN — BUDESONIDE AND FORMOTEROL FUMARATE DIHYDRATE 2 PUFF: 160; 4.5 AEROSOL RESPIRATORY (INHALATION) at 23:57

## 2021-01-01 RX ADMIN — SUCRALFATE 1 G: 1 TABLET ORAL at 12:14

## 2021-01-01 RX ADMIN — SODIUM CHLORIDE 1000 ML: 9 INJECTION, SOLUTION INTRAVENOUS at 15:13

## 2021-01-01 RX ADMIN — MORPHINE SULFATE 1 MG: 2 INJECTION, SOLUTION INTRAMUSCULAR; INTRAVENOUS at 17:51

## 2021-01-01 RX ADMIN — MORPHINE SULFATE 1 MG: 2 INJECTION, SOLUTION INTRAMUSCULAR; INTRAVENOUS at 08:39

## 2021-01-01 RX ADMIN — INSULIN LISPRO 2 UNITS: 100 INJECTION, SOLUTION INTRAVENOUS; SUBCUTANEOUS at 12:14

## 2021-01-01 RX ADMIN — BUSPIRONE HYDROCHLORIDE 30 MG: 10 TABLET ORAL at 08:47

## 2021-01-01 RX ADMIN — QUETIAPINE FUMARATE 300 MG: 100 TABLET ORAL at 21:02

## 2021-01-01 RX ADMIN — ONDANSETRON 4 MG: 2 INJECTION INTRAMUSCULAR; INTRAVENOUS at 13:57

## 2021-01-01 RX ADMIN — FLUOXETINE HYDROCHLORIDE 20 MG: 20 CAPSULE ORAL at 05:03

## 2021-01-01 RX ADMIN — SODIUM CHLORIDE 1000 ML: 9 INJECTION, SOLUTION INTRAVENOUS at 13:53

## 2021-01-01 RX ADMIN — BUDESONIDE AND FORMOTEROL FUMARATE DIHYDRATE 2 PUFF: 160; 4.5 AEROSOL RESPIRATORY (INHALATION) at 09:20

## 2021-01-01 RX ADMIN — PANTOPRAZOLE SODIUM 40 MG: 40 INJECTION, POWDER, FOR SOLUTION INTRAVENOUS at 21:02

## 2021-01-01 RX ADMIN — ASPIRIN 81 MG: 81 TABLET, COATED ORAL at 08:47

## 2021-01-01 RX ADMIN — PANTOPRAZOLE SODIUM 40 MG: 40 INJECTION, POWDER, FOR SOLUTION INTRAVENOUS at 08:41

## 2021-01-01 RX ADMIN — NORTRIPTYLINE HYDROCHLORIDE 10 MG: 10 CAPSULE ORAL at 21:02

## 2021-01-01 RX ADMIN — SUCRALFATE 1 G: 1 TABLET ORAL at 08:47

## 2021-01-01 RX ADMIN — BUDESONIDE AND FORMOTEROL FUMARATE DIHYDRATE 2 PUFF: 160; 4.5 AEROSOL RESPIRATORY (INHALATION) at 09:08

## 2021-01-01 RX ADMIN — INSULIN LISPRO 2 UNITS: 100 INJECTION, SOLUTION INTRAVENOUS; SUBCUTANEOUS at 21:29

## 2021-01-01 RX ADMIN — SODIUM CHLORIDE, PRESERVATIVE FREE 10 ML: 5 INJECTION INTRAVENOUS at 09:41

## 2021-01-01 RX ADMIN — INSULIN DETEMIR 4 UNITS: 100 INJECTION, SOLUTION SUBCUTANEOUS at 13:07

## 2021-01-01 RX ADMIN — INSULIN LISPRO 3 UNITS: 100 INJECTION, SOLUTION INTRAVENOUS; SUBCUTANEOUS at 21:17

## 2021-01-01 RX ADMIN — INSULIN LISPRO 2 UNITS: 100 INJECTION, SOLUTION INTRAVENOUS; SUBCUTANEOUS at 08:41

## 2021-01-01 RX ADMIN — SODIUM CHLORIDE, PRESERVATIVE FREE 10 ML: 5 INJECTION INTRAVENOUS at 21:58

## 2021-01-01 RX ADMIN — PROPOFOL 100 MG: 10 INJECTION, EMULSION INTRAVENOUS at 15:22

## 2021-01-01 RX ADMIN — SODIUM CHLORIDE 1000 ML: 9 INJECTION, SOLUTION INTRAVENOUS at 17:36

## 2021-01-01 RX ADMIN — DIVALPROEX SODIUM 500 MG: 500 TABLET, DELAYED RELEASE ORAL at 21:02

## 2021-01-01 RX ADMIN — HYDROMORPHONE HYDROCHLORIDE 0.5 MG: 1 INJECTION, SOLUTION INTRAMUSCULAR; INTRAVENOUS; SUBCUTANEOUS at 13:55

## 2021-01-01 RX ADMIN — FLUOXETINE HYDROCHLORIDE 20 MG: 20 CAPSULE ORAL at 04:53

## 2021-01-01 RX ADMIN — PANTOPRAZOLE SODIUM 40 MG: 40 INJECTION, POWDER, FOR SOLUTION INTRAVENOUS at 21:57

## 2021-01-01 RX ADMIN — LIDOCAINE HYDROCHLORIDE 50 MG: 10 INJECTION, SOLUTION EPIDURAL; INFILTRATION; INTRACAUDAL; PERINEURAL at 15:22

## 2021-01-01 RX ADMIN — SODIUM CHLORIDE 75 ML/HR: 9 INJECTION, SOLUTION INTRAVENOUS at 05:40

## 2021-01-01 RX ADMIN — SUCRALFATE 1 G: 1 TABLET ORAL at 16:43

## 2021-01-01 RX ADMIN — BUDESONIDE AND FORMOTEROL FUMARATE DIHYDRATE 2 PUFF: 160; 4.5 AEROSOL RESPIRATORY (INHALATION) at 19:43

## 2021-01-01 RX ADMIN — QUETIAPINE FUMARATE 300 MG: 100 TABLET ORAL at 21:31

## 2021-01-01 RX ADMIN — SODIUM CHLORIDE, PRESERVATIVE FREE 10 ML: 5 INJECTION INTRAVENOUS at 06:00

## 2021-01-01 RX ADMIN — IOPAMIDOL 95 ML: 612 INJECTION, SOLUTION INTRAVENOUS at 14:20

## 2021-01-01 RX ADMIN — DIVALPROEX SODIUM 500 MG: 500 TABLET, DELAYED RELEASE ORAL at 08:41

## 2021-01-01 RX ADMIN — MORPHINE SULFATE 1 MG: 2 INJECTION, SOLUTION INTRAMUSCULAR; INTRAVENOUS at 06:00

## 2021-01-01 RX ADMIN — MORPHINE SULFATE 1 MG: 2 INJECTION, SOLUTION INTRAMUSCULAR; INTRAVENOUS at 12:42

## 2021-01-01 RX ADMIN — SUCRALFATE 1 G: 1 TABLET ORAL at 21:31

## 2021-01-01 RX ADMIN — BUSPIRONE HYDROCHLORIDE 30 MG: 10 TABLET ORAL at 21:02

## 2021-01-01 RX ADMIN — PANTOPRAZOLE SODIUM 40 MG: 40 INJECTION, POWDER, FOR SOLUTION INTRAVENOUS at 08:47

## 2021-01-01 RX ADMIN — SODIUM CHLORIDE, PRESERVATIVE FREE 10 ML: 5 INJECTION INTRAVENOUS at 21:31

## 2021-01-01 RX ADMIN — DIVALPROEX SODIUM 500 MG: 500 TABLET, DELAYED RELEASE ORAL at 21:57

## 2021-01-01 RX ADMIN — INSULIN DETEMIR 4 UNITS: 100 INJECTION, SOLUTION SUBCUTANEOUS at 08:40

## 2021-01-01 RX ADMIN — DIVALPROEX SODIUM 500 MG: 500 TABLET, DELAYED RELEASE ORAL at 21:31

## 2021-01-01 RX ADMIN — ROSUVASTATIN CALCIUM 10 MG: 10 TABLET, COATED ORAL at 21:31

## 2021-01-01 RX ADMIN — INSULIN HUMAN 6 UNITS: 100 INJECTION, SOLUTION PARENTERAL at 19:12

## 2021-01-01 RX ADMIN — SODIUM CHLORIDE, PRESERVATIVE FREE 10 ML: 5 INJECTION INTRAVENOUS at 08:49

## 2021-05-05 ENCOUNTER — TRANSCRIBE ORDERS (OUTPATIENT)
Dept: CARDIOLOGY | Facility: CLINIC | Age: 51
End: 2021-05-05

## 2021-05-05 DIAGNOSIS — M79.605 BILATERAL LEG PAIN: Primary | ICD-10-CM

## 2021-05-05 DIAGNOSIS — M79.604 BILATERAL LEG PAIN: Primary | ICD-10-CM

## 2021-05-21 ENCOUNTER — TELEPHONE (OUTPATIENT)
Dept: CARDIOLOGY | Facility: CLINIC | Age: 51
End: 2021-05-21

## 2021-05-27 PROBLEM — I73.9 ASYMPTOMATIC PVD (PERIPHERAL VASCULAR DISEASE): Status: ACTIVE | Noted: 2021-01-01

## 2021-05-27 PROBLEM — Z72.0 TOBACCO USE: Status: ACTIVE | Noted: 2021-01-01

## 2021-05-27 PROBLEM — E78.5 HYPERLIPIDEMIA LDL GOAL <70: Status: ACTIVE | Noted: 2021-01-01

## 2021-05-27 PROBLEM — I10 ESSENTIAL HYPERTENSION: Status: ACTIVE | Noted: 2021-01-01

## 2021-05-27 PROBLEM — I73.9 ASYMPTOMATIC PVD (PERIPHERAL VASCULAR DISEASE) (HCC): Status: RESOLVED | Noted: 2021-01-01 | Resolved: 2021-01-01

## 2021-05-27 NOTE — PROGRESS NOTES
[facility] [place]  [facility] CARDIOLOGY  [address] [place] [address]  Dept: [phone]  Dept Fax: [phone]    Melchor Marie  1970    Televisit Note    You have chosen to receive care through a telephone visit. Do you consent to use a telephone visit for your medical care today? Yes    History of Present Illness:  Melchor Marie is a 51 y.o. male who presents via phone for Follow-up. PVD- He has claudications walking 3-4 blocks, YESIKA showed mild disease,  He is smoker and has diabetes, advised to quit, will start on Asa and Xarelto 2,5 bid, if not better in 3 months will do a CTA legs, .    The following portions of the patient's history were reviewed and updated as appropriate: allergies, current medications, past family history, past medical history, past social history, past surgical history and problem list.    This visit was scheduled as a phone visit to comply with patient safety concerns in accordance with CDC recommendations.  Time spent in discussion with the patient was 35 minutes.     Medications  aspirin  BD Pen Needle Kiersten 2nd Gen misc  busPIRone  divalproex  FLUoxetine  gabapentin  hydrOXYzine  Jardiance tablet  Lancets misc  lisinopril  nortriptyline  omeprazole  QUEtiapine  Rivaroxaban  rosuvastatin  Spiriva HandiHaler capsule  sulfamethoxazole-trimethoprim  Symbicort aerosol  tamsulosin capsule    Subjective  No Known Allergies     Past Medical History:   Diagnosis Date   • Chest pain    • Cholelithiasis    • Hyperlipidemia    • Hypertension    • Nerve damage     tooth pick injury   • Tobacco use    • Type 2 diabetes mellitus (CMS/HCC)        Past Surgical History:   Procedure Laterality Date   • CHOLECYSTECTOMY     • FOOT SURGERY Right        Family History   Problem Relation Age of Onset   • Coronary artery disease Mother 52        CABG*47   • Diabetes Father    • Diabetes Other         Social History     Socioeconomic History   • Marital status:       "Spouse name: Not on file   • Number of children: Not on file   • Years of education: Not on file   • Highest education level: Not on file   Tobacco Use   • Smoking status: Heavy Tobacco Smoker     Packs/day: 0.50     Types: Cigarettes   • Smokeless tobacco: Never Used   Substance and Sexual Activity   • Alcohol use: Not Currently   • Drug use: Never   • Sexual activity: Defer       Cardiovascular Procedures    ECHO/MUGA:   STRESS TESTS:   CARDIAC CATH:   DEVICES:   HOLTER:   CT/MRI:   VASCULAR:   CARDIOTHORACIC:     Objective  Vitals:    05/27/21 0856   BP: 117/70  Comment: at home   BP Location: Left arm   Patient Position: Sitting   Cuff Size: Adult   Resp: 12   SpO2: 99%   Weight: 83.5 kg (184 lb)   Height: 170.2 cm (67\")   PainSc: 0-No pain     Body mass index is 28.82 kg/m².    Assessment and Plan  Diagnoses and all orders for this visit:     PVD (peripheral vascular disease) (CMS/HCC)- On exertion walking -3 blocks, will start ASA and also Xarelto 2,5 bid, not better will do a CTA     Hyperlipidemia LDL goal <70- He is on Crestor 10 mg,     Essential hypertension- The BP is fine on Lisinopril 20 mg    Tobacco use- Advised to quit  Diabetes - on meds     Other orders  -     Symbicort 160-4.5 MCG/ACT inhaler; INHALE TWO PUFFS BY MOUTH TWO TIMES A DAY  -     Empagliflozin (Jardiance) 10 MG tablet  -     gabapentin (NEURONTIN) 100 MG capsule; TAKE ONE CAPSULE BY MOUTH THREE TIMES A DAY FOR 30 DAYS  -     BD Pen Needle Kiersten 2nd Gen 32G X 4 MM misc  -     Lancets misc  -     lisinopril (PRINIVIL,ZESTRIL) 20 MG tablet; Take 20 mg by mouth Daily.  -     omeprazole (priLOSEC) 40 MG capsule; TAKE ONE CAPSULE BY MOUTH DAILY 30 MINUTES BEFORE MORNING MEAL  -     rosuvastatin (CRESTOR) 10 MG tablet; Take 10 mg by mouth Daily.  -     Spiriva HandiHaler 18 MCG per inhalation capsule; INHALE CONTENTS OF 1 CAPSULE BY MOUTH DAILY THRU INHALER  -     Rivaroxaban (Xarelto) 2.5 MG tablet; Take 1 tablet by mouth 2 (Two) Times a " Day.  -     aspirin (aspirin) 81 MG EC tablet; Take 1 tablet by mouth Daily.        Return in about 3 months (around 8/27/2021) for Recheck.    Hang Wagoner MD  05/27/2021

## 2021-07-26 PROBLEM — T73.0XXA STARVATION KETOACIDOSIS: Status: ACTIVE | Noted: 2021-01-01

## 2021-07-26 PROBLEM — E87.1 HYPONATREMIA: Status: ACTIVE | Noted: 2021-01-01

## 2021-07-26 PROBLEM — F50.2 BULIMIA NERVOSA: Status: ACTIVE | Noted: 2021-01-01

## 2021-07-26 PROBLEM — E86.0 DEHYDRATION: Status: ACTIVE | Noted: 2021-01-01

## 2021-07-26 PROBLEM — R10.9 INTRACTABLE ABDOMINAL PAIN: Status: ACTIVE | Noted: 2021-01-01

## 2021-07-26 PROBLEM — K29.80 DUODENITIS: Status: ACTIVE | Noted: 2021-01-01

## 2021-07-26 PROBLEM — E87.29 STARVATION KETOACIDOSIS: Status: ACTIVE | Noted: 2021-01-01

## 2021-07-27 NOTE — ANESTHESIA POSTPROCEDURE EVALUATION
Patient: Melchor Marie    Procedure Summary     Date: 07/27/21 Room / Location:  [facility]    Anesthesia Start: 1520 Anesthesia Stop:     Procedure: ESOPHAGOGASTRODUODENOSCOPY (N/A ) Diagnosis:       Duodenitis      (Duodenitis [K29.80])    Surgeons: Servando Tyler MD Provider: Timoteo Gautam MD    Anesthesia Type: general ASA Status: 3          Anesthesia Type: general    Vitals  Vitals Value Taken Time   /66 07/27/21 1535   Temp 98.1 °F (36.7 °C) 07/27/21 1533   Pulse 71 07/27/21 1540   Resp 20 07/27/21 1533   SpO2 95 % 07/27/21 1540   Vitals shown include unvalidated device data.        Post Anesthesia Care and Evaluation    Patient location during evaluation: PACU  Patient participation: complete - patient participated  Level of consciousness: awake and alert  Pain management: adequate  Airway patency: patent  Anesthetic complications: No anesthetic complications  PONV Status: none  Cardiovascular status: hemodynamically stable and acceptable  Respiratory status: nonlabored ventilation, acceptable and nasal cannula  Hydration status: acceptable

## 2021-07-27 NOTE — ANESTHESIA PREPROCEDURE EVALUATION
Anesthesia Evaluation                  Airway   Mallampati: II  Dental      Pulmonary    Cardiovascular     (+) hypertension,       Neuro/Psych  GI/Hepatic/Renal/Endo    (+)   diabetes mellitus,     Musculoskeletal     Abdominal    Substance History      OB/GYN          Other                        Anesthesia Plan    ASA 3     general     intravenous induction     Anesthetic plan, all risks, benefits, and alternatives have been provided, discussed and informed consent has been obtained with: patient.

## 2021-08-27 PROBLEM — R07.9 CHEST PAIN AT REST: Status: ACTIVE | Noted: 2021-01-01

## 2021-08-27 PROBLEM — E11.9 TYPE 2 DIABETES MELLITUS, WITHOUT LONG-TERM CURRENT USE OF INSULIN (HCC): Status: ACTIVE | Noted: 2021-01-01

## 2021-08-27 NOTE — PROGRESS NOTES
[facility] [place]  [facility] CARDIOLOGY  [address] [place] [address]  Dept: [phone]  Dept Fax: [phone]    Melchor Marie  1970    Follow Up Office Visit Note    History of Present Illness:  Melchor Marie is a 51 y.o. male who presents to the clinic for Follow-up, Shortness of Breath, and Chest Pain.  He is complaining of chest pain, persistently for the last 3-4 days, constantly, has been in ER, his evaluation, was normal, EKG here is also normal, he did have a stress test last year normal, BP is 100.60, he is still smoking and has mild PVD. Although he complaints more that the clinical findings, YESIKA- Mild disease. Will get a CTA lower extremities     The following portions of the patient's history were reviewed and updated as appropriate: allergies, current medications, past family history, past medical history, past social history, past surgical history and problem list.    Medications:  aspirin  busPIRone  divalproex  Farxiga tablet  FLUoxetine  gabapentin  hydrOXYzine  lisinopril  nortriptyline  pantoprazole  QUEtiapine  Rivaroxaban  rosuvastatin  Spiriva HandiHaler capsule  sucralfate  Symbicort aerosol    Subjective  No Known Allergies     Past Medical History:   Diagnosis Date   • Chest pain    • Cholelithiasis    • Hyperlipidemia    • Hypertension    • Nerve damage     tooth pick injury   • Tobacco use    • Type 2 diabetes mellitus (CMS/HCC)        Past Surgical History:   Procedure Laterality Date   • CHOLECYSTECTOMY     • ENDOSCOPY N/A 7/27/2021    Procedure: ESOPHAGOGASTRODUODENOSCOPY;  Surgeon: Servando Tyler MD;  [facility] END[facility]COPY;  Service: Gastroenterology;  Laterality: N/A;   • FOOT SURGERY Right        Family History   Problem Relation Age of Onset   • Coronary artery disease Mother 52        CABG*47   • Diabetes Father    • Diabetes Other         Social History     Socioeconomic History   • Marital status:      Spouse name: Not on file  "  • Number of children: Not on file   • Years of education: Not on file   • Highest education level: Not on file   Tobacco Use   • Smoking status: Heavy Tobacco Smoker     Packs/day: 0.50     Types: Cigarettes   • Smokeless tobacco: Never Used   Vaping Use   • Vaping Use: Never used   Substance and Sexual Activity   • Alcohol use: Not Currently   • Drug use: Never   • Sexual activity: Defer       Review of Systems   Constitutional: Negative.    HENT: Negative.    Respiratory: Negative.    Cardiovascular: Positive for chest pain.   Endocrine: Negative.    Genitourinary: Negative.    Musculoskeletal: Negative.    Skin: Negative.    Allergic/Immunologic: Negative.    Neurological: Negative.    Hematological: Negative.    Psychiatric/Behavioral: Negative.    All other systems reviewed and are negative.      Cardiovascular Procedures    ECHO/MUGA:   STRESS TESTS:   CARDIAC CATH:   DEVICES:   HOLTER:   CT/MRI:   VASCULAR:   CARDIOTHORACIC:     Objective  Vitals:    08/27/21 1453   BP: 124/78   BP Location: Left arm   Patient Position: Lying   Cuff Size: Adult   Pulse: 86   Resp: 12   Temp: 99 °F (37.2 °C)   SpO2: 99%   Weight: 79.4 kg (175 lb)   Height: 170.2 cm (67\")   PainSc: 0-No pain     Body mass index is 27.41 kg/m².     Physical Exam  Constitutional:       Appearance: Healthy appearance. Not in distress.   Neck:      Vascular: No JVR. JVD normal.   Pulmonary:      Effort: Pulmonary effort is normal.      Breath sounds: Normal breath sounds. No wheezing. No rhonchi. No rales.   Chest:      Chest wall: Not tender to palpatation.   Cardiovascular:      PMI at left midclavicular line. Normal rate. Regular rhythm. Normal S1. Normal S2.      Murmurs: There is no murmur.      No gallop. No click. No rub.   Pulses:     Intact distal pulses.   Edema:     Peripheral edema absent.   Abdominal:      General: Bowel sounds are normal.      Palpations: Abdomen is soft.      Tenderness: There is no abdominal tenderness. "   Musculoskeletal: Normal range of motion.         General: No tenderness. Skin:     General: Skin is warm and dry.   Neurological:      General: No focal deficit present.      Mental Status: Alert and oriented to person, place and time.          Diagnostic Data    ECG 12 Lead    Date/Time: 8/27/2021 3:33 PM  Performed by: Hang Wagoner MD  Authorized by: Hang Wagoner MD   Comparison: compared with previous ECG   Rhythm: sinus rhythm  Rate: normal  BPM: 98  QRS axis: normal    Clinical impression: normal ECG            Assessment and Plan  Diagnoses and all orders for this visit:    Essential hypertension- BP is good even a little low 100.60 On Lisinopril 20 mg    Hyperlipidemia LDL goal <70On Crestor     Tobacco use- advised to quit smoking     Chest pain at rest- atypical, constant for few days, visit to ER normal, has had stress nuclear normal    Type 2 diabetes mellitus without complication, without long-term current use of insulin (CMS/HCC)    PVD (peripheral vascular disease) with claudication (CMS/HCC)- He complaints of legs pain, constantly . Will get a CTA   -     CT Angio Abdominal Aorta Bilateral Iliofem Runoff With & Without Contrast; Future         Return in about 6 months (around 2/27/2022) for Recheck.    Hang Wagoner MD  08/27/2021

## 2021-09-08 NOTE — TELEPHONE ENCOUNTER
Please advise...    CT is in chart under media.    Patient called for test results from [facility] CT. Call [phone]

## 2021-09-08 NOTE — PROGRESS NOTES
New Patient Consultation     Patient Name: Melchor Marie  : 1970   MRN: [id number]     Chief Complaint:    Chief Complaint   Patient presents with   • Hospital Follow Up Visit     hospital follow up on nausea; Epigaastric pain       History of Present Illness: Melchor Marie is a 51 y.o. male who is here today for a Gastroenterology Consultation for abdominal pain.  Melchor presented to [facility] ED on  with abdominal pain, nausea, vomiting.  He underwent CT of his abdomen and pelvis which showed circumferential wall edema of the first and second portions of the duodenum.  He was hyponatremic and had an elevated anion gap.  He was hydrated and underwent EGD which showed esophagitis, gastritis, and duodenal ulcers.  He has a history of bulimia nervosa with worsening self-inflicted and vomiting since  of this year due to death of his father.  He was discharged home on twice daily PPI as well as Carafate.    His biopsies were negative for H. pylori and negative for dysplasia.  Since discharge he reports he is still having some intermittent stomach pain, nausea and vomiting.  He denies blood in his stool or emesis.  He denies voluntarily purging since his admission.  He reports he has been compliant with his medication, however it appears he may have been out of this for the past several weeks.  He admits to constipation.  He reports he has MiraLAX at home but does not take it regularly.  He believes this contributes to some of his abdominal pain.  He denies any NSAID use.  He does get adequate dietary fiber per his report as well as adequate hydration.  Subjective      Review of Systems:   Review of Systems   Constitutional: Negative for activity change, appetite change, chills, diaphoresis, fever, unexpected weight gain and unexpected weight loss.   HENT: Negative for trouble swallowing.    Gastrointestinal: Positive for abdominal pain, constipation, nausea and vomiting. Negative for abdominal  distention, anal bleeding, blood in stool, diarrhea, rectal pain, GERD and indigestion.       Past Medical History:   Past Medical History:   Diagnosis Date   • Chest pain    • Cholelithiasis    • Hyperlipidemia    • Hypertension    • Nerve damage     tooth pick injury   • Tobacco use    • Type 2 diabetes mellitus (CMS/[facility])        Past Surgical History:   Past Surgical History:   Procedure Laterality Date   • CHOLECYSTECTOMY     • ENDOSCOPY N/A 7/27/2021    Procedure: ESOPHAGOGASTRODUODENOSCOPY;  Surgeon: Servando Tyler MD;  Location: [facility] ENDOSCOPY;  Service: Gastroenterology;  Laterality: N/A;   • FOOT SURGERY Right        Family History:   Family History   Problem Relation Age of Onset   • Coronary artery disease Mother 52        CABG*47   • Diabetes Father    • Diabetes Other        Social History:   Social History     Socioeconomic History   • Marital status:      Spouse name: Not on file   • Number of children: Not on file   • Years of education: Not on file   • Highest education level: Not on file   Tobacco Use   • Smoking status: Heavy Tobacco Smoker     Packs/day: 0.50     Types: Cigarettes   • Smokeless tobacco: Never Used   Vaping Use   • Vaping Use: Never used   Substance and Sexual Activity   • Alcohol use: Not Currently   • Drug use: Never   • Sexual activity: Defer       Alcohol/Tobacco History:   Social History     Substance and Sexual Activity   Alcohol Use Not Currently     Social History     Tobacco Use   Smoking Status Heavy Tobacco Smoker   • Packs/day: 0.50   • Types: Cigarettes   Smokeless Tobacco Never Used       Medications:     Current Outpatient Medications:   •  aspirin (aspirin) 81 MG EC tablet, Take 1 tablet by mouth Daily., Disp: 90 tablet, Rfl: 3  •  busPIRone (BUSPAR) 30 MG tablet, Take 30 mg by mouth 2 (two) times a day., Disp: , Rfl:   •  dapagliflozin (Farxiga) 5 MG tablet tablet, Take 5 mg by mouth Daily., Disp: , Rfl:   •  divalproex (DEPAKOTE) 500 MG DR tablet, Take  "500 mg by mouth 2 (Two) Times a Day., Disp: , Rfl:   •  FLUoxetine (PROzac) 40 MG capsule, Take 40 mg by mouth Every Morning., Disp: , Rfl:   •  gabapentin (NEURONTIN) 100 MG capsule, Take 100 mg by mouth 3 (Three) Times a Day. For 30 days prescription bottle says take for 30 day ws filled on 4/28/21 but still has 10 tablets left, Disp: , Rfl:   •  hydrOXYzine (ATARAX) 10 MG tablet, Take 10 mg by mouth Every 8 (Eight) Hours As Needed for Itching, Allergies or Anxiety., Disp: , Rfl:   •  Insulin Glargine (BASAGLAR KWIKPEN) 100 UNIT/ML injection pen, INJECT 8 UNITS SUBCUTANEOUS ONCE A DAY, Disp: , Rfl:   •  lisinopril (PRINIVIL,ZESTRIL) 20 MG tablet, Take 20 mg by mouth Daily., Disp: , Rfl:   •  metFORMIN ER (GLUCOPHAGE-XR) 500 MG 24 hr tablet, TAKE 2 TABLETS BY MOUTH EVERY EVENING WITH A MEAL, Disp: , Rfl:   •  pantoprazole (PROTONIX) 40 MG EC tablet, Take 1 tablet by mouth 2 (two) times a day., Disp: 60 tablet, Rfl: 5  •  QUEtiapine (SEROquel) 300 MG tablet, Take 300 mg by mouth Every Night., Disp: , Rfl:   •  Rivaroxaban (Xarelto) 2.5 MG tablet, Take 1 tablet by mouth 2 (Two) Times a Day., Disp: 60 tablet, Rfl: 6  •  rosuvastatin (CRESTOR) 10 MG tablet, Take 10 mg by mouth Every Night., Disp: , Rfl:   •  Spiriva HandiHaler 18 MCG per inhalation capsule, Place 1 capsule into inhaler and inhale Daily., Disp: , Rfl:   •  sucralfate (CARAFATE) 1 g tablet, Take 1 tablet by mouth 4 (Four) Times a Day Before Meals & at Bedtime., Disp: 120 tablet, Rfl: 1  •  Symbicort 160-4.5 MCG/ACT inhaler, Inhale 2 puffs 2 (Two) Times a Day., Disp: , Rfl:     Allergies:   No Known Allergies    Objective     Physical Exam:  Vital Signs:   Vitals:    09/08/21 0952   BP: 122/66   BP Location: Left arm   Patient Position: Sitting   Cuff Size: Adult   Pulse: 90   Temp: 97.7 °F (36.5 °C)   TempSrc: Temporal   SpO2: 98%   Weight: 84.1 kg (185 lb 6.4 oz)   Height: 170.2 cm (67.01\")     Body mass index is 29.03 kg/m².     Physical Exam  Vitals " and nursing note reviewed.   Constitutional:       General: He is not in acute distress.     Appearance: He is well-developed. He is not diaphoretic.   Eyes:      General: No scleral icterus.     Extraocular Movements:      Right eye: No nystagmus.      Left eye: No nystagmus.      Conjunctiva/sclera: Conjunctivae normal.      Pupils: Pupils are equal, round, and reactive to light.   Neck:      Thyroid: No thyromegaly.   Cardiovascular:      Rate and Rhythm: Normal rate and regular rhythm.   Pulmonary:      Effort: Pulmonary effort is normal.      Breath sounds: Normal breath sounds.   Abdominal:      General: Bowel sounds are normal. There is no distension. There are no signs of injury.      Palpations: Abdomen is soft. There is no hepatomegaly or splenomegaly.      Tenderness: There is abdominal tenderness in the epigastric area. There is no guarding or rebound.      Hernia: No hernia is present.   Musculoskeletal:      Cervical back: Neck supple.      Right lower leg: No edema.      Left lower leg: No edema.   Skin:     General: Skin is warm and dry.      Capillary Refill: Capillary refill takes 2 to 3 seconds.      Coloration: Skin is not jaundiced or pale.      Findings: No bruising or petechiae.      Nails: There is clubbing.   Neurological:      Mental Status: He is alert and oriented to person, place, and time.   Psychiatric:         Behavior: Behavior normal.         Thought Content: Thought content normal.         Judgment: Judgment normal.         Assessment / Plan      Assessment/Plan:   Diagnoses and all orders for this visit:    1. PUD (peptic ulcer disease) (Primary)  -     pantoprazole (PROTONIX) 40 MG EC tablet; Take 1 tablet by mouth 2 (two) times a day.  Dispense: 60 tablet; Refill: 5  -     sucralfate (CARAFATE) 1 g tablet; Take 1 tablet by mouth 4 (Four) Times a Day Before Meals & at Bedtime.  Dispense: 120 tablet; Refill: 1  Continue to avoid NSAIDs.  Dietary recommendations given.  Patient has  had partial improvement but it does appear he may have been out of his medications.  We will refill these today and bring him back for follow-up to assess his symptoms  2. Intractable abdominal pain  -     pantoprazole (PROTONIX) 40 MG EC tablet; Take 1 tablet by mouth 2 (two) times a day.  Dispense: 60 tablet; Refill: 5  -     sucralfate (CARAFATE) 1 g tablet; Take 1 tablet by mouth 4 (Four) Times a Day Before Meals & at Bedtime.  Dispense: 120 tablet; Refill: 1    3. Chronic idiopathic constipation  Start MiraLAX in 8 ounces of water once daily.  Notify me if not adequately effective  4. Encounter for screening colonoscopy  -     Ambulatory Referral For Screening Colonoscopy           Follow Up:   Return in about 8 weeks (around 11/3/2021), or if symptoms worsen or fail to improve.    Plan of care reviewed with the patient at the conclusion of today's visit.  Education was provided regarding diagnosis, management, and any prescribed or recommended OTC medications.  Patient verbalized understanding of and agreement with management plan.         JENNI Alvarez  [facility]

## 2021-10-18 NOTE — TELEPHONE ENCOUNTER
I tried to call Mr Marie back to give Colonoscopy report. NO answer; left voice message.  I will mail results letter.

## 2021-10-18 NOTE — TELEPHONE ENCOUNTER
Alfonzo Bar MD  You 2 minutes ago (1:54 PM)         He was told that the polyp was not caught in the trap.  His prep was very poor which is why recommended he come back in a year.  There is no pathology because it did not get caught in the trap and he was told that at the time of his procedure but he probably forgot.    Message text

## 2022-01-01 ENCOUNTER — APPOINTMENT (OUTPATIENT)
Dept: NEUROLOGY | Facility: HOSPITAL | Age: 52
End: 2022-01-01

## 2022-01-01 ENCOUNTER — APPOINTMENT (OUTPATIENT)
Dept: GENERAL RADIOLOGY | Facility: HOSPITAL | Age: 52
End: 2022-01-01

## 2022-01-01 ENCOUNTER — APPOINTMENT (OUTPATIENT)
Dept: OTHER | Facility: HOSPITAL | Age: 52
End: 2022-01-01

## 2022-01-01 ENCOUNTER — APPOINTMENT (OUTPATIENT)
Dept: MRI IMAGING | Facility: HOSPITAL | Age: 52
End: 2022-01-01

## 2022-01-01 ENCOUNTER — HOSPITAL ENCOUNTER (INPATIENT)
Facility: HOSPITAL | Age: 52
LOS: 6 days | Discharge: ANOTHER HOSPITAL | End: 2022-05-12
Attending: EMERGENCY MEDICINE | Admitting: INTERNAL MEDICINE

## 2022-01-01 VITALS
RESPIRATION RATE: 18 BRPM | TEMPERATURE: 97.6 F | OXYGEN SATURATION: 93 % | WEIGHT: 184.4 LBS | BODY MASS INDEX: 28.94 KG/M2 | HEART RATE: 76 BPM | SYSTOLIC BLOOD PRESSURE: 110 MMHG | HEIGHT: 67 IN | DIASTOLIC BLOOD PRESSURE: 76 MMHG

## 2022-01-01 DIAGNOSIS — R09.02 HYPOXIA: Primary | ICD-10-CM

## 2022-01-01 DIAGNOSIS — J84.10 PULMONARY FIBROSIS: ICD-10-CM

## 2022-01-01 DIAGNOSIS — J18.9 MULTIFOCAL PNEUMONIA: ICD-10-CM

## 2022-01-01 DIAGNOSIS — R06.02 SHORTNESS OF BREATH: ICD-10-CM

## 2022-01-01 DIAGNOSIS — Z76.89 REFERRED BY PRIMARY CARE PHYSICIAN: ICD-10-CM

## 2022-01-01 DIAGNOSIS — Z00.6 EXAMINATION FOR NORMAL COMPARISON FOR CLINICAL RESEARCH: ICD-10-CM

## 2022-01-01 LAB
ALBUMIN SERPL-MCNC: 4 G/DL (ref 3.5–5.2)
ALBUMIN/GLOB SERPL: 1.1 G/DL
ALP SERPL-CCNC: 144 U/L (ref 39–117)
ALT SERPL W P-5'-P-CCNC: 29 U/L (ref 1–41)
ANION GAP SERPL CALCULATED.3IONS-SCNC: 10 MMOL/L (ref 5–15)
ANION GAP SERPL CALCULATED.3IONS-SCNC: 11 MMOL/L (ref 5–15)
ANION GAP SERPL CALCULATED.3IONS-SCNC: 11 MMOL/L (ref 5–15)
ANION GAP SERPL CALCULATED.3IONS-SCNC: 15 MMOL/L (ref 5–15)
ANION GAP SERPL CALCULATED.3IONS-SCNC: 7 MMOL/L (ref 5–15)
ANION GAP SERPL CALCULATED.3IONS-SCNC: 9 MMOL/L (ref 5–15)
ARTERIAL PATENCY WRIST A: ABNORMAL
AST SERPL-CCNC: 32 U/L (ref 1–40)
ATMOSPHERIC PRESS: ABNORMAL MM[HG]
BACTERIA SPEC AEROBE CULT: NORMAL
BACTERIA SPEC AEROBE CULT: NORMAL
BASE EXCESS BLDA CALC-SCNC: 2.3 MMOL/L (ref 0–2)
BASE EXCESS BLDA CALC-SCNC: 5.2 MMOL/L (ref 0–2)
BASE EXCESS BLDA CALC-SCNC: 6.6 MMOL/L (ref 0–2)
BASOPHILS # BLD AUTO: 0.01 10*3/MM3 (ref 0–0.2)
BASOPHILS # BLD AUTO: 0.03 10*3/MM3 (ref 0–0.2)
BASOPHILS # BLD AUTO: 0.04 10*3/MM3 (ref 0–0.2)
BASOPHILS NFR BLD AUTO: 0.1 % (ref 0–1.5)
BASOPHILS NFR BLD AUTO: 0.1 % (ref 0–1.5)
BASOPHILS NFR BLD AUTO: 0.2 % (ref 0–1.5)
BASOPHILS NFR BLD AUTO: 0.3 % (ref 0–1.5)
BASOPHILS NFR BLD AUTO: 0.4 % (ref 0–1.5)
BDY SITE: ABNORMAL
BILIRUB SERPL-MCNC: 0.4 MG/DL (ref 0–1.2)
BILIRUB UR QL STRIP: NEGATIVE
BODY TEMPERATURE: 37 C
BUN SERPL-MCNC: 14 MG/DL (ref 6–20)
BUN SERPL-MCNC: 14 MG/DL (ref 6–20)
BUN SERPL-MCNC: 15 MG/DL (ref 6–20)
BUN SERPL-MCNC: 15 MG/DL (ref 6–20)
BUN SERPL-MCNC: 16 MG/DL (ref 6–20)
BUN SERPL-MCNC: 18 MG/DL (ref 6–20)
BUN SERPL-MCNC: 19 MG/DL (ref 6–20)
BUN SERPL-MCNC: 19 MG/DL (ref 6–20)
BUN/CREAT SERPL: 17.9 (ref 7–25)
BUN/CREAT SERPL: 19.7 (ref 7–25)
BUN/CREAT SERPL: 19.7 (ref 7–25)
BUN/CREAT SERPL: 25 (ref 7–25)
BUN/CREAT SERPL: 29.1 (ref 7–25)
BUN/CREAT SERPL: 31.6 (ref 7–25)
BUN/CREAT SERPL: 33.3 (ref 7–25)
BUN/CREAT SERPL: 40.4 (ref 7–25)
CALCIUM SPEC-SCNC: 8.3 MG/DL (ref 8.6–10.5)
CALCIUM SPEC-SCNC: 8.4 MG/DL (ref 8.6–10.5)
CALCIUM SPEC-SCNC: 8.6 MG/DL (ref 8.6–10.5)
CALCIUM SPEC-SCNC: 8.9 MG/DL (ref 8.6–10.5)
CALCIUM SPEC-SCNC: 9 MG/DL (ref 8.6–10.5)
CALCIUM SPEC-SCNC: 9.1 MG/DL (ref 8.6–10.5)
CALCIUM SPEC-SCNC: 9.2 MG/DL (ref 8.6–10.5)
CALCIUM SPEC-SCNC: 9.8 MG/DL (ref 8.6–10.5)
CHLORIDE SERPL-SCNC: 102 MMOL/L (ref 98–107)
CHLORIDE SERPL-SCNC: 103 MMOL/L (ref 98–107)
CHLORIDE SERPL-SCNC: 104 MMOL/L (ref 98–107)
CHLORIDE SERPL-SCNC: 90 MMOL/L (ref 98–107)
CHLORIDE SERPL-SCNC: 94 MMOL/L (ref 98–107)
CHLORIDE SERPL-SCNC: 95 MMOL/L (ref 98–107)
CHLORIDE SERPL-SCNC: 98 MMOL/L (ref 98–107)
CHLORIDE SERPL-SCNC: 98 MMOL/L (ref 98–107)
CLARITY UR: CLEAR
CO2 BLDA-SCNC: 28.5 MMOL/L (ref 22–33)
CO2 BLDA-SCNC: 31.9 MMOL/L (ref 22–33)
CO2 BLDA-SCNC: 33.1 MMOL/L (ref 22–33)
CO2 SERPL-SCNC: 26 MMOL/L (ref 22–29)
CO2 SERPL-SCNC: 26 MMOL/L (ref 22–29)
CO2 SERPL-SCNC: 27 MMOL/L (ref 22–29)
CO2 SERPL-SCNC: 28 MMOL/L (ref 22–29)
CO2 SERPL-SCNC: 29 MMOL/L (ref 22–29)
CO2 SERPL-SCNC: 33 MMOL/L (ref 22–29)
COHGB MFR BLD: 1.1 % (ref 0–2)
COHGB MFR BLD: 1.4 % (ref 0–2)
COHGB MFR BLD: 1.4 % (ref 0–2)
COLOR UR: YELLOW
CREAT SERPL-MCNC: 0.47 MG/DL (ref 0.76–1.27)
CREAT SERPL-MCNC: 0.55 MG/DL (ref 0.76–1.27)
CREAT SERPL-MCNC: 0.56 MG/DL (ref 0.76–1.27)
CREAT SERPL-MCNC: 0.57 MG/DL (ref 0.76–1.27)
CREAT SERPL-MCNC: 0.57 MG/DL (ref 0.76–1.27)
CREAT SERPL-MCNC: 0.71 MG/DL (ref 0.76–1.27)
CREAT SERPL-MCNC: 0.76 MG/DL (ref 0.76–1.27)
CREAT SERPL-MCNC: 0.84 MG/DL (ref 0.76–1.27)
D-LACTATE SERPL-SCNC: 1.4 MMOL/L (ref 0.5–2)
D-LACTATE SERPL-SCNC: 1.6 MMOL/L (ref 0.5–2)
D-LACTATE SERPL-SCNC: 2.7 MMOL/L (ref 0.5–2)
D-LACTATE SERPL-SCNC: 2.7 MMOL/L (ref 0.5–2)
D-LACTATE SERPL-SCNC: 2.8 MMOL/L (ref 0.5–2)
D-LACTATE SERPL-SCNC: 3.6 MMOL/L (ref 0.5–2)
DEPRECATED RDW RBC AUTO: 38.5 FL (ref 37–54)
DEPRECATED RDW RBC AUTO: 38.8 FL (ref 37–54)
DEPRECATED RDW RBC AUTO: 39.4 FL (ref 37–54)
DEPRECATED RDW RBC AUTO: 40.3 FL (ref 37–54)
DEPRECATED RDW RBC AUTO: 40.6 FL (ref 37–54)
DEPRECATED RDW RBC AUTO: 41 FL (ref 37–54)
DEPRECATED RDW RBC AUTO: 42.5 FL (ref 37–54)
DEPRECATED RDW RBC AUTO: 44 FL (ref 37–54)
EGFRCR SERPLBLD CKD-EPI 2021: 106.2 ML/MIN/1.73
EGFRCR SERPLBLD CKD-EPI 2021: 109.5 ML/MIN/1.73
EGFRCR SERPLBLD CKD-EPI 2021: 111.8 ML/MIN/1.73
EGFRCR SERPLBLD CKD-EPI 2021: 119.4 ML/MIN/1.73
EGFRCR SERPLBLD CKD-EPI 2021: 119.4 ML/MIN/1.73
EGFRCR SERPLBLD CKD-EPI 2021: 120.1 ML/MIN/1.73
EGFRCR SERPLBLD CKD-EPI 2021: 120.7 ML/MIN/1.73
EGFRCR SERPLBLD CKD-EPI 2021: 126.6 ML/MIN/1.73
EOSINOPHIL # BLD AUTO: 0 10*3/MM3 (ref 0–0.4)
EOSINOPHIL NFR BLD AUTO: 0 % (ref 0.3–6.2)
EPAP: 0
EPAP: 0
EPAP: 6
ERYTHROCYTE [DISTWIDTH] IN BLOOD BY AUTOMATED COUNT: 12.1 % (ref 12.3–15.4)
ERYTHROCYTE [DISTWIDTH] IN BLOOD BY AUTOMATED COUNT: 12.3 % (ref 12.3–15.4)
ERYTHROCYTE [DISTWIDTH] IN BLOOD BY AUTOMATED COUNT: 12.3 % (ref 12.3–15.4)
ERYTHROCYTE [DISTWIDTH] IN BLOOD BY AUTOMATED COUNT: 12.4 % (ref 12.3–15.4)
ERYTHROCYTE [DISTWIDTH] IN BLOOD BY AUTOMATED COUNT: 12.4 % (ref 12.3–15.4)
ERYTHROCYTE [DISTWIDTH] IN BLOOD BY AUTOMATED COUNT: 12.5 % (ref 12.3–15.4)
ERYTHROCYTE [DISTWIDTH] IN BLOOD BY AUTOMATED COUNT: 12.7 % (ref 12.3–15.4)
ERYTHROCYTE [DISTWIDTH] IN BLOOD BY AUTOMATED COUNT: 12.8 % (ref 12.3–15.4)
FLUAV RNA RESP QL NAA+PROBE: NOT DETECTED
FLUBV RNA RESP QL NAA+PROBE: NOT DETECTED
GLOBULIN UR ELPH-MCNC: 3.8 GM/DL
GLUCOSE BLDC GLUCOMTR-MCNC: 105 MG/DL (ref 70–130)
GLUCOSE BLDC GLUCOMTR-MCNC: 120 MG/DL (ref 70–130)
GLUCOSE BLDC GLUCOMTR-MCNC: 123 MG/DL (ref 70–130)
GLUCOSE BLDC GLUCOMTR-MCNC: 125 MG/DL (ref 70–130)
GLUCOSE BLDC GLUCOMTR-MCNC: 138 MG/DL (ref 70–130)
GLUCOSE BLDC GLUCOMTR-MCNC: 145 MG/DL (ref 70–130)
GLUCOSE BLDC GLUCOMTR-MCNC: 146 MG/DL (ref 70–130)
GLUCOSE BLDC GLUCOMTR-MCNC: 153 MG/DL (ref 70–130)
GLUCOSE BLDC GLUCOMTR-MCNC: 156 MG/DL (ref 70–130)
GLUCOSE BLDC GLUCOMTR-MCNC: 157 MG/DL (ref 70–130)
GLUCOSE BLDC GLUCOMTR-MCNC: 169 MG/DL (ref 70–130)
GLUCOSE BLDC GLUCOMTR-MCNC: 180 MG/DL (ref 70–130)
GLUCOSE BLDC GLUCOMTR-MCNC: 190 MG/DL (ref 70–130)
GLUCOSE BLDC GLUCOMTR-MCNC: 193 MG/DL (ref 70–130)
GLUCOSE BLDC GLUCOMTR-MCNC: 203 MG/DL (ref 70–130)
GLUCOSE BLDC GLUCOMTR-MCNC: 239 MG/DL (ref 70–130)
GLUCOSE BLDC GLUCOMTR-MCNC: 241 MG/DL (ref 70–130)
GLUCOSE BLDC GLUCOMTR-MCNC: 248 MG/DL (ref 70–130)
GLUCOSE BLDC GLUCOMTR-MCNC: 251 MG/DL (ref 70–130)
GLUCOSE BLDC GLUCOMTR-MCNC: 253 MG/DL (ref 70–130)
GLUCOSE BLDC GLUCOMTR-MCNC: 255 MG/DL (ref 70–130)
GLUCOSE BLDC GLUCOMTR-MCNC: 258 MG/DL (ref 70–130)
GLUCOSE BLDC GLUCOMTR-MCNC: 263 MG/DL (ref 70–130)
GLUCOSE BLDC GLUCOMTR-MCNC: 269 MG/DL (ref 70–130)
GLUCOSE BLDC GLUCOMTR-MCNC: 273 MG/DL (ref 70–130)
GLUCOSE BLDC GLUCOMTR-MCNC: 279 MG/DL (ref 70–130)
GLUCOSE BLDC GLUCOMTR-MCNC: 286 MG/DL (ref 70–130)
GLUCOSE BLDC GLUCOMTR-MCNC: 290 MG/DL (ref 70–130)
GLUCOSE BLDC GLUCOMTR-MCNC: 304 MG/DL (ref 70–130)
GLUCOSE BLDC GLUCOMTR-MCNC: 311 MG/DL (ref 70–130)
GLUCOSE BLDC GLUCOMTR-MCNC: 312 MG/DL (ref 70–130)
GLUCOSE BLDC GLUCOMTR-MCNC: 317 MG/DL (ref 70–130)
GLUCOSE BLDC GLUCOMTR-MCNC: 328 MG/DL (ref 70–130)
GLUCOSE BLDC GLUCOMTR-MCNC: 333 MG/DL (ref 70–130)
GLUCOSE BLDC GLUCOMTR-MCNC: 334 MG/DL (ref 70–130)
GLUCOSE BLDC GLUCOMTR-MCNC: 358 MG/DL (ref 70–130)
GLUCOSE BLDC GLUCOMTR-MCNC: 370 MG/DL (ref 70–130)
GLUCOSE BLDC GLUCOMTR-MCNC: 372 MG/DL (ref 70–130)
GLUCOSE BLDC GLUCOMTR-MCNC: 385 MG/DL (ref 70–130)
GLUCOSE BLDC GLUCOMTR-MCNC: 392 MG/DL (ref 70–130)
GLUCOSE BLDC GLUCOMTR-MCNC: 394 MG/DL (ref 70–130)
GLUCOSE BLDC GLUCOMTR-MCNC: 487 MG/DL (ref 70–130)
GLUCOSE BLDC GLUCOMTR-MCNC: 534 MG/DL (ref 70–130)
GLUCOSE BLDC GLUCOMTR-MCNC: 93 MG/DL (ref 70–130)
GLUCOSE SERPL-MCNC: 114 MG/DL (ref 65–99)
GLUCOSE SERPL-MCNC: 124 MG/DL (ref 65–99)
GLUCOSE SERPL-MCNC: 171 MG/DL (ref 65–99)
GLUCOSE SERPL-MCNC: 173 MG/DL (ref 65–99)
GLUCOSE SERPL-MCNC: 248 MG/DL (ref 65–99)
GLUCOSE SERPL-MCNC: 319 MG/DL (ref 65–99)
GLUCOSE SERPL-MCNC: 351 MG/DL (ref 65–99)
GLUCOSE SERPL-MCNC: 426 MG/DL (ref 65–99)
GLUCOSE UR STRIP-MCNC: ABNORMAL MG/DL
HBA1C MFR BLD: 11.7 % (ref 4.8–5.6)
HCO3 BLDA-SCNC: 27.2 MMOL/L (ref 20–26)
HCO3 BLDA-SCNC: 30.4 MMOL/L (ref 20–26)
HCO3 BLDA-SCNC: 31.7 MMOL/L (ref 20–26)
HCT VFR BLD AUTO: 40.4 % (ref 37.5–51)
HCT VFR BLD AUTO: 40.5 % (ref 37.5–51)
HCT VFR BLD AUTO: 41.9 % (ref 37.5–51)
HCT VFR BLD AUTO: 42.5 % (ref 37.5–51)
HCT VFR BLD AUTO: 42.6 % (ref 37.5–51)
HCT VFR BLD AUTO: 43.2 % (ref 37.5–51)
HCT VFR BLD AUTO: 44.5 % (ref 37.5–51)
HCT VFR BLD AUTO: 50.7 % (ref 37.5–51)
HCT VFR BLD CALC: 46.1 % (ref 38–51)
HCT VFR BLD CALC: 46.7 % (ref 38–51)
HCT VFR BLD CALC: 47.9 % (ref 38–51)
HGB BLD-MCNC: 14.1 G/DL (ref 13–17.7)
HGB BLD-MCNC: 14.6 G/DL (ref 13–17.7)
HGB BLD-MCNC: 14.6 G/DL (ref 13–17.7)
HGB BLD-MCNC: 14.7 G/DL (ref 13–17.7)
HGB BLD-MCNC: 14.7 G/DL (ref 13–17.7)
HGB BLD-MCNC: 14.9 G/DL (ref 13–17.7)
HGB BLD-MCNC: 15.2 G/DL (ref 13–17.7)
HGB BLD-MCNC: 17.3 G/DL (ref 13–17.7)
HGB BLDA-MCNC: 15 G/DL (ref 13.5–17.5)
HGB BLDA-MCNC: 15.2 G/DL (ref 13.5–17.5)
HGB BLDA-MCNC: 15.6 G/DL (ref 13.5–17.5)
HGB UR QL STRIP.AUTO: NEGATIVE
HOLD SPECIMEN: NORMAL
IMM GRANULOCYTES # BLD AUTO: 0.02 10*3/MM3 (ref 0–0.05)
IMM GRANULOCYTES # BLD AUTO: 0.02 10*3/MM3 (ref 0–0.05)
IMM GRANULOCYTES # BLD AUTO: 0.04 10*3/MM3 (ref 0–0.05)
IMM GRANULOCYTES # BLD AUTO: 0.04 10*3/MM3 (ref 0–0.05)
IMM GRANULOCYTES # BLD AUTO: 0.05 10*3/MM3 (ref 0–0.05)
IMM GRANULOCYTES NFR BLD AUTO: 0.3 % (ref 0–0.5)
IMM GRANULOCYTES NFR BLD AUTO: 0.4 % (ref 0–0.5)
INHALED O2 CONCENTRATION: 70 %
INHALED O2 CONCENTRATION: 80 %
INHALED O2 CONCENTRATION: 83 %
IPAP: 0
IPAP: 0
IPAP: 16
KETONES UR QL STRIP: NEGATIVE
L PNEUMO1 AG UR QL IA: NEGATIVE
LEUKOCYTE ESTERASE UR QL STRIP.AUTO: NEGATIVE
LYMPHOCYTES # BLD AUTO: 0.57 10*3/MM3 (ref 0.7–3.1)
LYMPHOCYTES # BLD AUTO: 0.6 10*3/MM3 (ref 0.7–3.1)
LYMPHOCYTES # BLD AUTO: 0.74 10*3/MM3 (ref 0.7–3.1)
LYMPHOCYTES # BLD AUTO: 1.42 10*3/MM3 (ref 0.7–3.1)
LYMPHOCYTES # BLD AUTO: 1.68 10*3/MM3 (ref 0.7–3.1)
LYMPHOCYTES NFR BLD AUTO: 14 % (ref 19.6–45.3)
LYMPHOCYTES NFR BLD AUTO: 20.2 % (ref 19.6–45.3)
LYMPHOCYTES NFR BLD AUTO: 5.1 % (ref 19.6–45.3)
LYMPHOCYTES NFR BLD AUTO: 6 % (ref 19.6–45.3)
LYMPHOCYTES NFR BLD AUTO: 9.4 % (ref 19.6–45.3)
MAGNESIUM SERPL-MCNC: 2 MG/DL (ref 1.6–2.6)
MAGNESIUM SERPL-MCNC: 2 MG/DL (ref 1.6–2.6)
MAGNESIUM SERPL-MCNC: 2.2 MG/DL (ref 1.6–2.6)
MAGNESIUM SERPL-MCNC: 2.3 MG/DL (ref 1.6–2.6)
MCH RBC QN AUTO: 30.9 PG (ref 26.6–33)
MCH RBC QN AUTO: 30.9 PG (ref 26.6–33)
MCH RBC QN AUTO: 31 PG (ref 26.6–33)
MCH RBC QN AUTO: 31.1 PG (ref 26.6–33)
MCH RBC QN AUTO: 31.2 PG (ref 26.6–33)
MCH RBC QN AUTO: 31.3 PG (ref 26.6–33)
MCHC RBC AUTO-ENTMCNC: 33 G/DL (ref 31.5–35.7)
MCHC RBC AUTO-ENTMCNC: 34.1 G/DL (ref 31.5–35.7)
MCHC RBC AUTO-ENTMCNC: 34.3 G/DL (ref 31.5–35.7)
MCHC RBC AUTO-ENTMCNC: 34.5 G/DL (ref 31.5–35.7)
MCHC RBC AUTO-ENTMCNC: 34.8 G/DL (ref 31.5–35.7)
MCHC RBC AUTO-ENTMCNC: 35.1 G/DL (ref 31.5–35.7)
MCHC RBC AUTO-ENTMCNC: 35.8 G/DL (ref 31.5–35.7)
MCHC RBC AUTO-ENTMCNC: 36.1 G/DL (ref 31.5–35.7)
MCV RBC AUTO: 86.3 FL (ref 79–97)
MCV RBC AUTO: 86.9 FL (ref 79–97)
MCV RBC AUTO: 88.2 FL (ref 79–97)
MCV RBC AUTO: 89.6 FL (ref 79–97)
MCV RBC AUTO: 89.8 FL (ref 79–97)
MCV RBC AUTO: 90.5 FL (ref 79–97)
MCV RBC AUTO: 91 FL (ref 79–97)
MCV RBC AUTO: 94.7 FL (ref 79–97)
METHGB BLD QL: 0.2 % (ref 0–1.5)
METHGB BLD QL: 0.3 % (ref 0–1.5)
METHGB BLD QL: 0.4 % (ref 0–1.5)
MODALITY: ABNORMAL
MONOCYTES # BLD AUTO: 0.05 10*3/MM3 (ref 0.1–0.9)
MONOCYTES # BLD AUTO: 0.23 10*3/MM3 (ref 0.1–0.9)
MONOCYTES # BLD AUTO: 0.4 10*3/MM3 (ref 0.1–0.9)
MONOCYTES # BLD AUTO: 0.52 10*3/MM3 (ref 0.1–0.9)
MONOCYTES # BLD AUTO: 0.93 10*3/MM3 (ref 0.1–0.9)
MONOCYTES NFR BLD AUTO: 0.8 % (ref 5–12)
MONOCYTES NFR BLD AUTO: 1.9 % (ref 5–12)
MONOCYTES NFR BLD AUTO: 3.6 % (ref 5–12)
MONOCYTES NFR BLD AUTO: 7.4 % (ref 5–12)
MONOCYTES NFR BLD AUTO: 7.8 % (ref 5–12)
NEUTROPHILS NFR BLD AUTO: 10.15 10*3/MM3 (ref 1.7–7)
NEUTROPHILS NFR BLD AUTO: 11.24 10*3/MM3 (ref 1.7–7)
NEUTROPHILS NFR BLD AUTO: 5.05 10*3/MM3 (ref 1.7–7)
NEUTROPHILS NFR BLD AUTO: 5.71 10*3/MM3 (ref 1.7–7)
NEUTROPHILS NFR BLD AUTO: 71.7 % (ref 42.7–76)
NEUTROPHILS NFR BLD AUTO: 77.6 % (ref 42.7–76)
NEUTROPHILS NFR BLD AUTO: 89.3 % (ref 42.7–76)
NEUTROPHILS NFR BLD AUTO: 9.28 10*3/MM3 (ref 1.7–7)
NEUTROPHILS NFR BLD AUTO: 90.8 % (ref 42.7–76)
NEUTROPHILS NFR BLD AUTO: 91.7 % (ref 42.7–76)
NITRITE UR QL STRIP: NEGATIVE
NOTE: ABNORMAL
NRBC BLD AUTO-RTO: 0 /100 WBC (ref 0–0.2)
NT-PROBNP SERPL-MCNC: 1824 PG/ML (ref 0–900)
NT-PROBNP SERPL-MCNC: 367.6 PG/ML (ref 0–900)
NT-PROBNP SERPL-MCNC: 568.3 PG/ML (ref 0–900)
OXYHGB MFR BLDV: 87.3 % (ref 94–99)
OXYHGB MFR BLDV: 94.9 % (ref 94–99)
OXYHGB MFR BLDV: 98.1 % (ref 94–99)
PAW @ PEAK INSP FLOW SETTING VENT: 0 CMH2O
PCO2 BLDA: 41.9 MM HG (ref 35–45)
PCO2 BLDA: 45.3 MM HG (ref 35–45)
PCO2 BLDA: 45.8 MM HG (ref 35–45)
PCO2 TEMP ADJ BLD: 41.9 MM HG (ref 35–48)
PCO2 TEMP ADJ BLD: 45.3 MM HG (ref 35–48)
PCO2 TEMP ADJ BLD: 45.8 MM HG (ref 35–48)
PH BLDA: 7.42 PH UNITS (ref 7.35–7.45)
PH BLDA: 7.43 PH UNITS (ref 7.35–7.45)
PH BLDA: 7.45 PH UNITS (ref 7.35–7.45)
PH UR STRIP.AUTO: 6.5 [PH] (ref 5–8)
PH, TEMP CORRECTED: 7.42 PH UNITS
PH, TEMP CORRECTED: 7.43 PH UNITS
PH, TEMP CORRECTED: 7.45 PH UNITS
PLATELET # BLD AUTO: 209 10*3/MM3 (ref 140–450)
PLATELET # BLD AUTO: 214 10*3/MM3 (ref 140–450)
PLATELET # BLD AUTO: 216 10*3/MM3 (ref 140–450)
PLATELET # BLD AUTO: 219 10*3/MM3 (ref 140–450)
PLATELET # BLD AUTO: 220 10*3/MM3 (ref 140–450)
PLATELET # BLD AUTO: 222 10*3/MM3 (ref 140–450)
PLATELET # BLD AUTO: 250 10*3/MM3 (ref 140–450)
PLATELET # BLD AUTO: 310 10*3/MM3 (ref 140–450)
PMV BLD AUTO: 10 FL (ref 6–12)
PMV BLD AUTO: 10 FL (ref 6–12)
PMV BLD AUTO: 9.6 FL (ref 6–12)
PMV BLD AUTO: 9.7 FL (ref 6–12)
PMV BLD AUTO: 9.8 FL (ref 6–12)
PMV BLD AUTO: 9.9 FL (ref 6–12)
PO2 BLDA: 152 MM HG (ref 83–108)
PO2 BLDA: 53.8 MM HG (ref 83–108)
PO2 BLDA: 79.8 MM HG (ref 83–108)
PO2 TEMP ADJ BLD: 152 MM HG (ref 83–108)
PO2 TEMP ADJ BLD: 53.8 MM HG (ref 83–108)
PO2 TEMP ADJ BLD: 79.8 MM HG (ref 83–108)
POTASSIUM SERPL-SCNC: 3.8 MMOL/L (ref 3.5–5.2)
POTASSIUM SERPL-SCNC: 4 MMOL/L (ref 3.5–5.2)
POTASSIUM SERPL-SCNC: 4.1 MMOL/L (ref 3.5–5.2)
POTASSIUM SERPL-SCNC: 4.2 MMOL/L (ref 3.5–5.2)
POTASSIUM SERPL-SCNC: 4.3 MMOL/L (ref 3.5–5.2)
POTASSIUM SERPL-SCNC: 4.5 MMOL/L (ref 3.5–5.2)
POTASSIUM SERPL-SCNC: 4.7 MMOL/L (ref 3.5–5.2)
POTASSIUM SERPL-SCNC: 4.7 MMOL/L (ref 3.5–5.2)
PROCALCITONIN SERPL-MCNC: 0.09 NG/ML (ref 0–0.25)
PROT SERPL-MCNC: 7.8 G/DL (ref 6–8.5)
PROT UR QL STRIP: NEGATIVE
QT INTERVAL: 352 MS
QT INTERVAL: 374 MS
QT INTERVAL: 412 MS
QT INTERVAL: 468 MS
QT INTERVAL: 468 MS
QTC INTERVAL: 451 MS
QTC INTERVAL: 454 MS
QTC INTERVAL: 455 MS
QTC INTERVAL: 455 MS
QTC INTERVAL: 466 MS
RBC # BLD AUTO: 4.52 10*6/MM3 (ref 4.14–5.8)
RBC # BLD AUTO: 4.68 10*6/MM3 (ref 4.14–5.8)
RBC # BLD AUTO: 4.68 10*6/MM3 (ref 4.14–5.8)
RBC # BLD AUTO: 4.7 10*6/MM3 (ref 4.14–5.8)
RBC # BLD AUTO: 4.75 10*6/MM3 (ref 4.14–5.8)
RBC # BLD AUTO: 4.81 10*6/MM3 (ref 4.14–5.8)
RBC # BLD AUTO: 4.89 10*6/MM3 (ref 4.14–5.8)
RBC # BLD AUTO: 5.6 10*6/MM3 (ref 4.14–5.8)
S PNEUM AG SPEC QL LA: NEGATIVE
SARS-COV-2 RNA RESP QL NAA+PROBE: NOT DETECTED
SODIUM SERPL-SCNC: 131 MMOL/L (ref 136–145)
SODIUM SERPL-SCNC: 133 MMOL/L (ref 136–145)
SODIUM SERPL-SCNC: 134 MMOL/L (ref 136–145)
SODIUM SERPL-SCNC: 138 MMOL/L (ref 136–145)
SODIUM SERPL-SCNC: 142 MMOL/L (ref 136–145)
SP GR UR STRIP: 1.02 (ref 1–1.03)
TOTAL RATE: 0 BREATHS/MINUTE
TROPONIN T SERPL-MCNC: <0.01 NG/ML (ref 0–0.03)
UROBILINOGEN UR QL STRIP: ABNORMAL
VALPROATE SERPL-MCNC: 46 MCG/ML (ref 50–125)
WBC NRBC COR # BLD: 10.46 10*3/MM3 (ref 3.4–10.8)
WBC NRBC COR # BLD: 10.89 10*3/MM3 (ref 3.4–10.8)
WBC NRBC COR # BLD: 11.18 10*3/MM3 (ref 3.4–10.8)
WBC NRBC COR # BLD: 11.77 10*3/MM3 (ref 3.4–10.8)
WBC NRBC COR # BLD: 11.97 10*3/MM3 (ref 3.4–10.8)
WBC NRBC COR # BLD: 12.26 10*3/MM3 (ref 3.4–10.8)
WBC NRBC COR # BLD: 6.39 10*3/MM3 (ref 3.4–10.8)
WBC NRBC COR # BLD: 7.04 10*3/MM3 (ref 3.4–10.8)
WHOLE BLOOD HOLD SPECIMEN: NORMAL
WHOLE BLOOD HOLD SPECIMEN: NORMAL

## 2022-01-01 PROCEDURE — 87040 BLOOD CULTURE FOR BACTERIA: CPT | Performed by: EMERGENCY MEDICINE

## 2022-01-01 PROCEDURE — 36415 COLL VENOUS BLD VENIPUNCTURE: CPT

## 2022-01-01 PROCEDURE — 97530 THERAPEUTIC ACTIVITIES: CPT

## 2022-01-01 PROCEDURE — 83605 ASSAY OF LACTIC ACID: CPT | Performed by: PHYSICIAN ASSISTANT

## 2022-01-01 PROCEDURE — 83735 ASSAY OF MAGNESIUM: CPT | Performed by: INTERNAL MEDICINE

## 2022-01-01 PROCEDURE — 85025 COMPLETE CBC W/AUTO DIFF WBC: CPT | Performed by: EMERGENCY MEDICINE

## 2022-01-01 PROCEDURE — 25010000002 HEPARIN (PORCINE) PER 1000 UNITS: Performed by: INTERNAL MEDICINE

## 2022-01-01 PROCEDURE — 63710000001 PREDNISONE PER 1 MG: Performed by: INTERNAL MEDICINE

## 2022-01-01 PROCEDURE — 94799 UNLISTED PULMONARY SVC/PX: CPT

## 2022-01-01 PROCEDURE — 80048 BASIC METABOLIC PNL TOTAL CA: CPT | Performed by: PHYSICIAN ASSISTANT

## 2022-01-01 PROCEDURE — 82805 BLOOD GASES W/O2 SATURATION: CPT

## 2022-01-01 PROCEDURE — 25010000002 METHYLPREDNISOLONE PER 125 MG: Performed by: INTERNAL MEDICINE

## 2022-01-01 PROCEDURE — 80048 BASIC METABOLIC PNL TOTAL CA: CPT | Performed by: INTERNAL MEDICINE

## 2022-01-01 PROCEDURE — 63710000001 INSULIN DETEMIR PER 5 UNITS: Performed by: INTERNAL MEDICINE

## 2022-01-01 PROCEDURE — 95819 EEG AWAKE AND ASLEEP: CPT

## 2022-01-01 PROCEDURE — 84484 ASSAY OF TROPONIN QUANT: CPT | Performed by: EMERGENCY MEDICINE

## 2022-01-01 PROCEDURE — 99233 SBSQ HOSP IP/OBS HIGH 50: CPT | Performed by: INTERNAL MEDICINE

## 2022-01-01 PROCEDURE — 83880 ASSAY OF NATRIURETIC PEPTIDE: CPT | Performed by: INTERNAL MEDICINE

## 2022-01-01 PROCEDURE — 83036 HEMOGLOBIN GLYCOSYLATED A1C: CPT | Performed by: INTERNAL MEDICINE

## 2022-01-01 PROCEDURE — 97162 PT EVAL MOD COMPLEX 30 MIN: CPT

## 2022-01-01 PROCEDURE — 87636 SARSCOV2 & INF A&B AMP PRB: CPT | Performed by: EMERGENCY MEDICINE

## 2022-01-01 PROCEDURE — 71045 X-RAY EXAM CHEST 1 VIEW: CPT

## 2022-01-01 PROCEDURE — 94664 DEMO&/EVAL PT USE INHALER: CPT

## 2022-01-01 PROCEDURE — 94660 CPAP INITIATION&MGMT: CPT

## 2022-01-01 PROCEDURE — 87899 AGENT NOS ASSAY W/OPTIC: CPT | Performed by: INTERNAL MEDICINE

## 2022-01-01 PROCEDURE — 83050 HGB METHEMOGLOBIN QUAN: CPT

## 2022-01-01 PROCEDURE — 25010000002 CEFTRIAXONE PER 250 MG: Performed by: INTERNAL MEDICINE

## 2022-01-01 PROCEDURE — 25010000002 AZITHROMYCIN PER 500 MG: Performed by: INTERNAL MEDICINE

## 2022-01-01 PROCEDURE — 94761 N-INVAS EAR/PLS OXIMETRY MLT: CPT

## 2022-01-01 PROCEDURE — 63710000001 INSULIN LISPRO (HUMAN) PER 5 UNITS: Performed by: INTERNAL MEDICINE

## 2022-01-01 PROCEDURE — 82962 GLUCOSE BLOOD TEST: CPT

## 2022-01-01 PROCEDURE — 99232 SBSQ HOSP IP/OBS MODERATE 35: CPT | Performed by: STUDENT IN AN ORGANIZED HEALTH CARE EDUCATION/TRAINING PROGRAM

## 2022-01-01 PROCEDURE — 99223 1ST HOSP IP/OBS HIGH 75: CPT | Performed by: INTERNAL MEDICINE

## 2022-01-01 PROCEDURE — 85025 COMPLETE CBC W/AUTO DIFF WBC: CPT | Performed by: INTERNAL MEDICINE

## 2022-01-01 PROCEDURE — 82375 ASSAY CARBOXYHB QUANT: CPT

## 2022-01-01 PROCEDURE — 84145 PROCALCITONIN (PCT): CPT | Performed by: EMERGENCY MEDICINE

## 2022-01-01 PROCEDURE — 93005 ELECTROCARDIOGRAM TRACING: CPT | Performed by: EMERGENCY MEDICINE

## 2022-01-01 PROCEDURE — 85025 COMPLETE CBC W/AUTO DIFF WBC: CPT | Performed by: PHYSICIAN ASSISTANT

## 2022-01-01 PROCEDURE — 36600 WITHDRAWAL OF ARTERIAL BLOOD: CPT

## 2022-01-01 PROCEDURE — 83880 ASSAY OF NATRIURETIC PEPTIDE: CPT | Performed by: EMERGENCY MEDICINE

## 2022-01-01 PROCEDURE — 99232 SBSQ HOSP IP/OBS MODERATE 35: CPT | Performed by: INTERNAL MEDICINE

## 2022-01-01 PROCEDURE — 93005 ELECTROCARDIOGRAM TRACING: CPT | Performed by: INTERNAL MEDICINE

## 2022-01-01 PROCEDURE — 83605 ASSAY OF LACTIC ACID: CPT | Performed by: STUDENT IN AN ORGANIZED HEALTH CARE EDUCATION/TRAINING PROGRAM

## 2022-01-01 PROCEDURE — 99232 SBSQ HOSP IP/OBS MODERATE 35: CPT | Performed by: PSYCHIATRY & NEUROLOGY

## 2022-01-01 PROCEDURE — 25010000002 AZITHROMYCIN PER 500 MG: Performed by: EMERGENCY MEDICINE

## 2022-01-01 PROCEDURE — 25010000002 ACETAZOLAMIDE PER 500 MG: Performed by: INTERNAL MEDICINE

## 2022-01-01 PROCEDURE — 99222 1ST HOSP IP/OBS MODERATE 55: CPT | Performed by: PSYCHIATRY & NEUROLOGY

## 2022-01-01 PROCEDURE — 81003 URINALYSIS AUTO W/O SCOPE: CPT | Performed by: INTERNAL MEDICINE

## 2022-01-01 PROCEDURE — 80164 ASSAY DIPROPYLACETIC ACD TOT: CPT | Performed by: INTERNAL MEDICINE

## 2022-01-01 PROCEDURE — 97166 OT EVAL MOD COMPLEX 45 MIN: CPT

## 2022-01-01 PROCEDURE — 93010 ELECTROCARDIOGRAM REPORT: CPT | Performed by: INTERNAL MEDICINE

## 2022-01-01 PROCEDURE — 63710000001 INSULIN DETEMIR PER 5 UNITS: Performed by: STUDENT IN AN ORGANIZED HEALTH CARE EDUCATION/TRAINING PROGRAM

## 2022-01-01 PROCEDURE — 97535 SELF CARE MNGMENT TRAINING: CPT

## 2022-01-01 PROCEDURE — 85027 COMPLETE CBC AUTOMATED: CPT | Performed by: INTERNAL MEDICINE

## 2022-01-01 PROCEDURE — 94640 AIRWAY INHALATION TREATMENT: CPT

## 2022-01-01 PROCEDURE — 25010000002 CEFTRIAXONE PER 250 MG: Performed by: EMERGENCY MEDICINE

## 2022-01-01 PROCEDURE — 94760 N-INVAS EAR/PLS OXIMETRY 1: CPT

## 2022-01-01 PROCEDURE — 99284 EMERGENCY DEPT VISIT MOD MDM: CPT

## 2022-01-01 PROCEDURE — 99239 HOSP IP/OBS DSCHRG MGMT >30: CPT | Performed by: NURSE PRACTITIONER

## 2022-01-01 PROCEDURE — 83605 ASSAY OF LACTIC ACID: CPT | Performed by: EMERGENCY MEDICINE

## 2022-01-01 PROCEDURE — 80053 COMPREHEN METABOLIC PANEL: CPT | Performed by: EMERGENCY MEDICINE

## 2022-01-01 RX ORDER — ACETAZOLAMIDE SODIUM 500 MG/5ML
500 INJECTION, POWDER, LYOPHILIZED, FOR SOLUTION INTRAVENOUS ONCE
Status: COMPLETED | OUTPATIENT
Start: 2022-01-01 | End: 2022-01-01

## 2022-01-01 RX ORDER — QUETIAPINE FUMARATE 25 MG/1
25 TABLET, FILM COATED ORAL 2 TIMES DAILY
Status: DISCONTINUED | OUTPATIENT
Start: 2022-01-01 | End: 2022-01-01 | Stop reason: HOSPADM

## 2022-01-01 RX ORDER — NICOTINE POLACRILEX 4 MG
15 LOZENGE BUCCAL
Status: DISCONTINUED | OUTPATIENT
Start: 2022-01-01 | End: 2022-01-01 | Stop reason: SDUPTHER

## 2022-01-01 RX ORDER — BUMETANIDE 0.25 MG/ML
1 INJECTION INTRAMUSCULAR; INTRAVENOUS ONCE
Status: COMPLETED | OUTPATIENT
Start: 2022-01-01 | End: 2022-01-01

## 2022-01-01 RX ORDER — MAGNESIUM SULFATE HEPTAHYDRATE 40 MG/ML
2 INJECTION, SOLUTION INTRAVENOUS AS NEEDED
Status: DISCONTINUED | OUTPATIENT
Start: 2022-01-01 | End: 2022-01-01 | Stop reason: HOSPADM

## 2022-01-01 RX ORDER — ONDANSETRON 2 MG/ML
4 INJECTION INTRAMUSCULAR; INTRAVENOUS EVERY 6 HOURS PRN
Status: DISCONTINUED | OUTPATIENT
Start: 2022-01-01 | End: 2022-01-01 | Stop reason: HOSPADM

## 2022-01-01 RX ORDER — BUDESONIDE AND FORMOTEROL FUMARATE DIHYDRATE 160; 4.5 UG/1; UG/1
2 AEROSOL RESPIRATORY (INHALATION)
Status: DISCONTINUED | OUTPATIENT
Start: 2022-01-01 | End: 2022-01-01 | Stop reason: HOSPADM

## 2022-01-01 RX ORDER — ALPRAZOLAM 1 MG/1
1 TABLET ORAL 2 TIMES DAILY PRN
COMMUNITY
End: 2022-01-01 | Stop reason: HOSPADM

## 2022-01-01 RX ORDER — INSULIN LISPRO 100 [IU]/ML
5 INJECTION, SOLUTION INTRAVENOUS; SUBCUTANEOUS
Status: DISCONTINUED | OUTPATIENT
Start: 2022-01-01 | End: 2022-01-01

## 2022-01-01 RX ORDER — BENZONATATE 100 MG/1
100 CAPSULE ORAL 3 TIMES DAILY PRN
Status: DISCONTINUED | OUTPATIENT
Start: 2022-01-01 | End: 2022-01-01 | Stop reason: HOSPADM

## 2022-01-01 RX ORDER — QUETIAPINE FUMARATE 100 MG/1
300 TABLET, FILM COATED ORAL NIGHTLY
Status: DISCONTINUED | OUTPATIENT
Start: 2022-01-01 | End: 2022-01-01 | Stop reason: HOSPADM

## 2022-01-01 RX ORDER — ONDANSETRON 2 MG/ML
4 INJECTION INTRAMUSCULAR; INTRAVENOUS EVERY 6 HOURS PRN
Start: 2022-01-01

## 2022-01-01 RX ORDER — DEXTROSE MONOHYDRATE 25 G/50ML
25 INJECTION, SOLUTION INTRAVENOUS
Status: DISCONTINUED | OUTPATIENT
Start: 2022-01-01 | End: 2022-01-01 | Stop reason: SDUPTHER

## 2022-01-01 RX ORDER — METFORMIN HYDROCHLORIDE 500 MG/1
1000 TABLET, EXTENDED RELEASE ORAL
Status: DISCONTINUED | OUTPATIENT
Start: 2022-01-01 | End: 2022-01-01

## 2022-01-01 RX ORDER — LISINOPRIL 20 MG/1
20 TABLET ORAL DAILY
Status: DISCONTINUED | OUTPATIENT
Start: 2022-01-01 | End: 2022-01-01

## 2022-01-01 RX ORDER — SULFAMETHOXAZOLE AND TRIMETHOPRIM 800; 160 MG/1; MG/1
1 TABLET ORAL 3 TIMES WEEKLY
Qty: 12 TABLET | Refills: 0 | Status: SHIPPED | OUTPATIENT
Start: 2022-01-01 | End: 2022-06-09

## 2022-01-01 RX ORDER — INSULIN LISPRO 100 [IU]/ML
0-7 INJECTION, SOLUTION INTRAVENOUS; SUBCUTANEOUS
Status: DISCONTINUED | OUTPATIENT
Start: 2022-01-01 | End: 2022-01-01

## 2022-01-01 RX ORDER — MAGNESIUM SULFATE 1 G/100ML
1 INJECTION INTRAVENOUS AS NEEDED
Status: DISCONTINUED | OUTPATIENT
Start: 2022-01-01 | End: 2022-01-01 | Stop reason: HOSPADM

## 2022-01-01 RX ORDER — INSULIN LISPRO 100 [IU]/ML
6 INJECTION, SOLUTION INTRAVENOUS; SUBCUTANEOUS
Status: DISCONTINUED | OUTPATIENT
Start: 2022-01-01 | End: 2022-01-01 | Stop reason: HOSPADM

## 2022-01-01 RX ORDER — FLUOXETINE HYDROCHLORIDE 20 MG/1
40 CAPSULE ORAL EVERY MORNING
Status: DISCONTINUED | OUTPATIENT
Start: 2022-01-01 | End: 2022-01-01 | Stop reason: HOSPADM

## 2022-01-01 RX ORDER — GUAIFENESIN/DEXTROMETHORPHAN 100-10MG/5
5 SYRUP ORAL EVERY 4 HOURS PRN
Status: DISCONTINUED | OUTPATIENT
Start: 2022-01-01 | End: 2022-01-01 | Stop reason: HOSPADM

## 2022-01-01 RX ORDER — INSULIN LISPRO 100 [IU]/ML
0-9 INJECTION, SOLUTION INTRAVENOUS; SUBCUTANEOUS
Refills: 12
Start: 2022-01-01

## 2022-01-01 RX ORDER — INSULIN LISPRO 100 [IU]/ML
7 INJECTION, SOLUTION INTRAVENOUS; SUBCUTANEOUS
Status: DISCONTINUED | OUTPATIENT
Start: 2022-01-01 | End: 2022-01-01

## 2022-01-01 RX ORDER — IPRATROPIUM BROMIDE AND ALBUTEROL SULFATE 2.5; .5 MG/3ML; MG/3ML
3 SOLUTION RESPIRATORY (INHALATION)
Qty: 360 ML
Start: 2022-01-01

## 2022-01-01 RX ORDER — BENZONATATE 100 MG/1
100 CAPSULE ORAL 3 TIMES DAILY PRN
Start: 2022-01-01

## 2022-01-01 RX ORDER — FAMOTIDINE 20 MG/1
40 TABLET, FILM COATED ORAL DAILY
Status: DISCONTINUED | OUTPATIENT
Start: 2022-01-01 | End: 2022-01-01

## 2022-01-01 RX ORDER — NICOTINE POLACRILEX 4 MG
15 LOZENGE BUCCAL
Status: DISCONTINUED | OUTPATIENT
Start: 2022-01-01 | End: 2022-01-01 | Stop reason: HOSPADM

## 2022-01-01 RX ORDER — ALBUTEROL SULFATE 90 UG/1
2 AEROSOL, METERED RESPIRATORY (INHALATION) EVERY 6 HOURS PRN
COMMUNITY
End: 2022-01-01 | Stop reason: HOSPADM

## 2022-01-01 RX ORDER — SULFAMETHOXAZOLE AND TRIMETHOPRIM 800; 160 MG/1; MG/1
1 TABLET ORAL 3 TIMES WEEKLY
Status: DISCONTINUED | OUTPATIENT
Start: 2022-01-01 | End: 2022-01-01 | Stop reason: HOSPADM

## 2022-01-01 RX ORDER — METHYLPREDNISOLONE SODIUM SUCCINATE 125 MG/2ML
60 INJECTION, POWDER, LYOPHILIZED, FOR SOLUTION INTRAMUSCULAR; INTRAVENOUS EVERY 12 HOURS SCHEDULED
Status: DISCONTINUED | OUTPATIENT
Start: 2022-01-01 | End: 2022-01-01

## 2022-01-01 RX ORDER — IPRATROPIUM BROMIDE AND ALBUTEROL SULFATE 2.5; .5 MG/3ML; MG/3ML
3 SOLUTION RESPIRATORY (INHALATION)
Status: DISCONTINUED | OUTPATIENT
Start: 2022-01-01 | End: 2022-01-01 | Stop reason: HOSPADM

## 2022-01-01 RX ORDER — QUETIAPINE FUMARATE 25 MG/1
25 TABLET, FILM COATED ORAL 2 TIMES DAILY
COMMUNITY

## 2022-01-01 RX ORDER — MAGNESIUM SULFATE HEPTAHYDRATE 40 MG/ML
4 INJECTION, SOLUTION INTRAVENOUS AS NEEDED
Status: DISCONTINUED | OUTPATIENT
Start: 2022-01-01 | End: 2022-01-01 | Stop reason: HOSPADM

## 2022-01-01 RX ORDER — PREDNISONE 20 MG/1
60 TABLET ORAL
Status: DISCONTINUED | OUTPATIENT
Start: 2022-01-01 | End: 2022-01-01 | Stop reason: HOSPADM

## 2022-01-01 RX ORDER — INSULIN LISPRO 100 [IU]/ML
0-9 INJECTION, SOLUTION INTRAVENOUS; SUBCUTANEOUS
Status: DISCONTINUED | OUTPATIENT
Start: 2022-01-01 | End: 2022-01-01

## 2022-01-01 RX ORDER — NICOTINE 21 MG/24HR
1 PATCH, TRANSDERMAL 24 HOURS TRANSDERMAL EVERY 24 HOURS
Status: DISCONTINUED | OUTPATIENT
Start: 2022-01-01 | End: 2022-01-01

## 2022-01-01 RX ORDER — ALPRAZOLAM 1 MG/1
1 TABLET ORAL EVERY 8 HOURS PRN
Status: DISCONTINUED | OUTPATIENT
Start: 2022-01-01 | End: 2022-01-01

## 2022-01-01 RX ORDER — ALPRAZOLAM 0.5 MG/1
0.5 TABLET ORAL EVERY 8 HOURS PRN
Start: 2022-01-01

## 2022-01-01 RX ORDER — DEXTROSE MONOHYDRATE 25 G/50ML
25 INJECTION, SOLUTION INTRAVENOUS
Status: DISCONTINUED | OUTPATIENT
Start: 2022-01-01 | End: 2022-01-01 | Stop reason: HOSPADM

## 2022-01-01 RX ORDER — INSULIN LISPRO 100 [IU]/ML
0-9 INJECTION, SOLUTION INTRAVENOUS; SUBCUTANEOUS
Status: DISCONTINUED | OUTPATIENT
Start: 2022-01-01 | End: 2022-01-01 | Stop reason: HOSPADM

## 2022-01-01 RX ORDER — HEPARIN SODIUM 5000 [USP'U]/ML
5000 INJECTION, SOLUTION INTRAVENOUS; SUBCUTANEOUS EVERY 8 HOURS SCHEDULED
Status: DISCONTINUED | OUTPATIENT
Start: 2022-01-01 | End: 2022-01-01 | Stop reason: HOSPADM

## 2022-01-01 RX ORDER — NICOTINE 21 MG/24HR
1 PATCH, TRANSDERMAL 24 HOURS TRANSDERMAL EVERY 24 HOURS
COMMUNITY
End: 2022-01-01 | Stop reason: HOSPADM

## 2022-01-01 RX ORDER — INSULIN LISPRO 100 [IU]/ML
0-14 INJECTION, SOLUTION INTRAVENOUS; SUBCUTANEOUS
Status: DISCONTINUED | OUTPATIENT
Start: 2022-01-01 | End: 2022-01-01

## 2022-01-01 RX ORDER — PREDNISONE 20 MG/1
60 TABLET ORAL DAILY
Status: DISCONTINUED | OUTPATIENT
Start: 2022-01-01 | End: 2022-01-01

## 2022-01-01 RX ORDER — GABAPENTIN 100 MG/1
100 CAPSULE ORAL 3 TIMES DAILY
Status: DISCONTINUED | OUTPATIENT
Start: 2022-01-01 | End: 2022-01-01

## 2022-01-01 RX ORDER — SODIUM CHLORIDE 9 MG/ML
75 INJECTION, SOLUTION INTRAVENOUS CONTINUOUS
Status: ACTIVE | OUTPATIENT
Start: 2022-01-01 | End: 2022-01-01

## 2022-01-01 RX ORDER — IPRATROPIUM BROMIDE AND ALBUTEROL SULFATE 2.5; .5 MG/3ML; MG/3ML
3 SOLUTION RESPIRATORY (INHALATION) ONCE
Status: COMPLETED | OUTPATIENT
Start: 2022-01-01 | End: 2022-01-01

## 2022-01-01 RX ORDER — PIRFENIDONE 267 MG/1
267 TABLET, FILM COATED ORAL 3 TIMES DAILY
Status: DISCONTINUED | OUTPATIENT
Start: 2022-01-01 | End: 2022-01-01 | Stop reason: HOSPADM

## 2022-01-01 RX ORDER — ALPRAZOLAM 1 MG/1
1 TABLET ORAL 2 TIMES DAILY PRN
Status: DISCONTINUED | OUTPATIENT
Start: 2022-01-01 | End: 2022-01-01

## 2022-01-01 RX ORDER — SODIUM CHLORIDE 0.9 % (FLUSH) 0.9 %
10 SYRINGE (ML) INJECTION AS NEEDED
Status: DISCONTINUED | OUTPATIENT
Start: 2022-01-01 | End: 2022-01-01 | Stop reason: HOSPADM

## 2022-01-01 RX ORDER — LISINOPRIL 5 MG/1
5 TABLET ORAL DAILY
Status: DISCONTINUED | OUTPATIENT
Start: 2022-01-01 | End: 2022-01-01 | Stop reason: HOSPADM

## 2022-01-01 RX ORDER — PREDNISONE 20 MG/1
60 TABLET ORAL
Start: 2022-01-01

## 2022-01-01 RX ORDER — LISINOPRIL 5 MG/1
5 TABLET ORAL DAILY
Start: 2022-01-01

## 2022-01-01 RX ORDER — IPRATROPIUM BROMIDE AND ALBUTEROL SULFATE 2.5; .5 MG/3ML; MG/3ML
3 SOLUTION RESPIRATORY (INHALATION) EVERY 4 HOURS PRN
Status: DISCONTINUED | OUTPATIENT
Start: 2022-01-01 | End: 2022-01-01 | Stop reason: HOSPADM

## 2022-01-01 RX ORDER — INSULIN LISPRO 100 [IU]/ML
6 INJECTION, SOLUTION INTRAVENOUS; SUBCUTANEOUS
Refills: 12
Start: 2022-01-01

## 2022-01-01 RX ORDER — PANTOPRAZOLE SODIUM 40 MG/1
40 TABLET, DELAYED RELEASE ORAL
Start: 2022-01-01

## 2022-01-01 RX ORDER — IPRATROPIUM BROMIDE AND ALBUTEROL SULFATE 2.5; .5 MG/3ML; MG/3ML
3 SOLUTION RESPIRATORY (INHALATION) EVERY 4 HOURS PRN
Qty: 360 ML
Start: 2022-01-01

## 2022-01-01 RX ORDER — PANTOPRAZOLE SODIUM 40 MG/1
40 TABLET, DELAYED RELEASE ORAL
Status: DISCONTINUED | OUTPATIENT
Start: 2022-01-01 | End: 2022-01-01 | Stop reason: HOSPADM

## 2022-01-01 RX ORDER — SODIUM CHLORIDE 0.9 % (FLUSH) 0.9 %
10 SYRINGE (ML) INJECTION EVERY 12 HOURS SCHEDULED
Status: DISCONTINUED | OUTPATIENT
Start: 2022-01-01 | End: 2022-01-01 | Stop reason: HOSPADM

## 2022-01-01 RX ORDER — ALPRAZOLAM 0.5 MG/1
0.5 TABLET ORAL EVERY 8 HOURS PRN
Status: DISCONTINUED | OUTPATIENT
Start: 2022-01-01 | End: 2022-01-01 | Stop reason: HOSPADM

## 2022-01-01 RX ORDER — GUAIFENESIN/DEXTROMETHORPHAN 100-10MG/5
5 SYRUP ORAL EVERY 4 HOURS PRN
Start: 2022-01-01

## 2022-01-01 RX ADMIN — BUDESONIDE AND FORMOTEROL FUMARATE DIHYDRATE 2 PUFF: 160; 4.5 AEROSOL RESPIRATORY (INHALATION) at 06:45

## 2022-01-01 RX ADMIN — INSULIN LISPRO 3 UNITS: 100 INJECTION, SOLUTION INTRAVENOUS; SUBCUTANEOUS at 21:31

## 2022-01-01 RX ADMIN — SODIUM CHLORIDE 1 G: 900 INJECTION INTRAVENOUS at 10:58

## 2022-01-01 RX ADMIN — WATER 500 MG: 1 INJECTION INTRAMUSCULAR; INTRAVENOUS; SUBCUTANEOUS at 15:20

## 2022-01-01 RX ADMIN — PANTOPRAZOLE SODIUM 40 MG: 40 TABLET, DELAYED RELEASE ORAL at 06:18

## 2022-01-01 RX ADMIN — DIVALPROEX SODIUM 750 MG: 500 TABLET, DELAYED RELEASE ORAL at 20:08

## 2022-01-01 RX ADMIN — DIVALPROEX SODIUM 750 MG: 500 TABLET, DELAYED RELEASE ORAL at 21:36

## 2022-01-01 RX ADMIN — DIVALPROEX SODIUM 750 MG: 500 TABLET, DELAYED RELEASE ORAL at 08:52

## 2022-01-01 RX ADMIN — INSULIN LISPRO 3 UNITS: 100 INJECTION, SOLUTION INTRAVENOUS; SUBCUTANEOUS at 12:05

## 2022-01-01 RX ADMIN — ALPRAZOLAM 0.5 MG: 0.5 TABLET ORAL at 21:32

## 2022-01-01 RX ADMIN — Medication 1 PATCH: at 00:19

## 2022-01-01 RX ADMIN — QUETIAPINE FUMARATE 25 MG: 25 TABLET ORAL at 12:23

## 2022-01-01 RX ADMIN — AZITHROMYCIN 500 MG: 500 INJECTION, POWDER, LYOPHILIZED, FOR SOLUTION INTRAVENOUS at 08:37

## 2022-01-01 RX ADMIN — INSULIN LISPRO 10 UNITS: 100 INJECTION, SOLUTION INTRAVENOUS; SUBCUTANEOUS at 17:46

## 2022-01-01 RX ADMIN — INSULIN LISPRO 7 UNITS: 100 INJECTION, SOLUTION INTRAVENOUS; SUBCUTANEOUS at 09:33

## 2022-01-01 RX ADMIN — INSULIN LISPRO 6 UNITS: 100 INJECTION, SOLUTION INTRAVENOUS; SUBCUTANEOUS at 17:36

## 2022-01-01 RX ADMIN — HEPARIN SODIUM 5000 UNITS: 5000 INJECTION, SOLUTION INTRAVENOUS; SUBCUTANEOUS at 13:50

## 2022-01-01 RX ADMIN — PIRFENIDONE 267 MG: 267 TABLET, COATED ORAL at 09:39

## 2022-01-01 RX ADMIN — INSULIN LISPRO 8 UNITS: 100 INJECTION, SOLUTION INTRAVENOUS; SUBCUTANEOUS at 13:50

## 2022-01-01 RX ADMIN — HEPARIN SODIUM 5000 UNITS: 5000 INJECTION, SOLUTION INTRAVENOUS; SUBCUTANEOUS at 05:07

## 2022-01-01 RX ADMIN — BUDESONIDE AND FORMOTEROL FUMARATE DIHYDRATE 2 PUFF: 160; 4.5 AEROSOL RESPIRATORY (INHALATION) at 07:38

## 2022-01-01 RX ADMIN — LISINOPRIL 20 MG: 20 TABLET ORAL at 08:39

## 2022-01-01 RX ADMIN — ALPRAZOLAM 1 MG: 1 TABLET ORAL at 18:22

## 2022-01-01 RX ADMIN — PIRFENIDONE 267 MG: 267 TABLET, COATED ORAL at 15:01

## 2022-01-01 RX ADMIN — SODIUM CHLORIDE 500 MG: 900 INJECTION INTRAVENOUS at 20:09

## 2022-01-01 RX ADMIN — IPRATROPIUM BROMIDE AND ALBUTEROL SULFATE 3 ML: 2.5; .5 SOLUTION RESPIRATORY (INHALATION) at 07:38

## 2022-01-01 RX ADMIN — HEPARIN SODIUM 5000 UNITS: 5000 INJECTION, SOLUTION INTRAVENOUS; SUBCUTANEOUS at 06:12

## 2022-01-01 RX ADMIN — INSULIN DETEMIR 15 UNITS: 100 INJECTION, SOLUTION SUBCUTANEOUS at 20:32

## 2022-01-01 RX ADMIN — INSULIN LISPRO 6 UNITS: 100 INJECTION, SOLUTION INTRAVENOUS; SUBCUTANEOUS at 21:00

## 2022-01-01 RX ADMIN — HEPARIN SODIUM 5000 UNITS: 5000 INJECTION, SOLUTION INTRAVENOUS; SUBCUTANEOUS at 22:49

## 2022-01-01 RX ADMIN — QUETIAPINE FUMARATE 300 MG: 100 TABLET ORAL at 21:32

## 2022-01-01 RX ADMIN — INSULIN LISPRO 12 UNITS: 100 INJECTION, SOLUTION INTRAVENOUS; SUBCUTANEOUS at 15:20

## 2022-01-01 RX ADMIN — INSULIN LISPRO 5 UNITS: 100 INJECTION, SOLUTION INTRAVENOUS; SUBCUTANEOUS at 09:09

## 2022-01-01 RX ADMIN — HEPARIN SODIUM 5000 UNITS: 5000 INJECTION, SOLUTION INTRAVENOUS; SUBCUTANEOUS at 14:51

## 2022-01-01 RX ADMIN — SODIUM CHLORIDE 250 ML: 9 INJECTION, SOLUTION INTRAVENOUS at 20:24

## 2022-01-01 RX ADMIN — GUAIFENESIN AND DEXTROMETHORPHAN 5 ML: 100; 10 SYRUP ORAL at 11:07

## 2022-01-01 RX ADMIN — INSULIN DETEMIR 50 UNITS: 100 INJECTION, SOLUTION SUBCUTANEOUS at 08:41

## 2022-01-01 RX ADMIN — INSULIN LISPRO 10 UNITS: 100 INJECTION, SOLUTION INTRAVENOUS; SUBCUTANEOUS at 20:07

## 2022-01-01 RX ADMIN — ALPRAZOLAM 0.5 MG: 0.5 TABLET ORAL at 20:51

## 2022-01-01 RX ADMIN — Medication 10 ML: at 09:33

## 2022-01-01 RX ADMIN — LISINOPRIL 5 MG: 5 TABLET ORAL at 09:30

## 2022-01-01 RX ADMIN — SODIUM CHLORIDE 1 G: 900 INJECTION INTRAVENOUS at 09:44

## 2022-01-01 RX ADMIN — DIVALPROEX SODIUM 750 MG: 500 TABLET, DELAYED RELEASE ORAL at 09:31

## 2022-01-01 RX ADMIN — QUETIAPINE FUMARATE 300 MG: 100 TABLET ORAL at 21:36

## 2022-01-01 RX ADMIN — INSULIN LISPRO 7 UNITS: 100 INJECTION, SOLUTION INTRAVENOUS; SUBCUTANEOUS at 13:50

## 2022-01-01 RX ADMIN — SODIUM CHLORIDE 500 MG: 900 INJECTION INTRAVENOUS at 21:38

## 2022-01-01 RX ADMIN — PANTOPRAZOLE SODIUM 40 MG: 40 TABLET, DELAYED RELEASE ORAL at 06:51

## 2022-01-01 RX ADMIN — INSULIN LISPRO 3 UNITS: 100 INJECTION, SOLUTION INTRAVENOUS; SUBCUTANEOUS at 02:49

## 2022-01-01 RX ADMIN — FLUOXETINE HYDROCHLORIDE 40 MG: 20 CAPSULE ORAL at 08:44

## 2022-01-01 RX ADMIN — INSULIN LISPRO 10 UNITS: 100 INJECTION, SOLUTION INTRAVENOUS; SUBCUTANEOUS at 18:15

## 2022-01-01 RX ADMIN — PREDNISONE 60 MG: 20 TABLET ORAL at 09:26

## 2022-01-01 RX ADMIN — BUDESONIDE AND FORMOTEROL FUMARATE DIHYDRATE 2 PUFF: 160; 4.5 AEROSOL RESPIRATORY (INHALATION) at 06:51

## 2022-01-01 RX ADMIN — Medication 10 ML: at 20:51

## 2022-01-01 RX ADMIN — IPRATROPIUM BROMIDE AND ALBUTEROL SULFATE 3 ML: .5; 3 SOLUTION RESPIRATORY (INHALATION) at 14:32

## 2022-01-01 RX ADMIN — INSULIN LISPRO 12 UNITS: 100 INJECTION, SOLUTION INTRAVENOUS; SUBCUTANEOUS at 13:00

## 2022-01-01 RX ADMIN — INSULIN LISPRO 3 UNITS: 100 INJECTION, SOLUTION INTRAVENOUS; SUBCUTANEOUS at 02:21

## 2022-01-01 RX ADMIN — HEPARIN SODIUM 5000 UNITS: 5000 INJECTION, SOLUTION INTRAVENOUS; SUBCUTANEOUS at 06:41

## 2022-01-01 RX ADMIN — METFORMIN HYDROCHLORIDE 1000 MG: 500 TABLET, EXTENDED RELEASE ORAL at 08:39

## 2022-01-01 RX ADMIN — BUDESONIDE AND FORMOTEROL FUMARATE DIHYDRATE 2 PUFF: 160; 4.5 AEROSOL RESPIRATORY (INHALATION) at 20:07

## 2022-01-01 RX ADMIN — METHYLPREDNISOLONE SODIUM SUCCINATE 60 MG: 125 INJECTION, POWDER, FOR SOLUTION INTRAMUSCULAR; INTRAVENOUS at 09:43

## 2022-01-01 RX ADMIN — HEPARIN SODIUM 5000 UNITS: 5000 INJECTION, SOLUTION INTRAVENOUS; SUBCUTANEOUS at 21:36

## 2022-01-01 RX ADMIN — INSULIN LISPRO 10 UNITS: 100 INJECTION, SOLUTION INTRAVENOUS; SUBCUTANEOUS at 21:32

## 2022-01-01 RX ADMIN — HEPARIN SODIUM 5000 UNITS: 5000 INJECTION, SOLUTION INTRAVENOUS; SUBCUTANEOUS at 20:09

## 2022-01-01 RX ADMIN — INSULIN LISPRO 6 UNITS: 100 INJECTION, SOLUTION INTRAVENOUS; SUBCUTANEOUS at 17:46

## 2022-01-01 RX ADMIN — DIVALPROEX SODIUM 750 MG: 500 TABLET, DELAYED RELEASE ORAL at 22:55

## 2022-01-01 RX ADMIN — AZITHROMYCIN 500 MG: 500 INJECTION, POWDER, LYOPHILIZED, FOR SOLUTION INTRAVENOUS at 18:33

## 2022-01-01 RX ADMIN — HEPARIN SODIUM 5000 UNITS: 5000 INJECTION, SOLUTION INTRAVENOUS; SUBCUTANEOUS at 21:31

## 2022-01-01 RX ADMIN — QUETIAPINE FUMARATE 25 MG: 25 TABLET ORAL at 08:52

## 2022-01-01 RX ADMIN — AZITHROMYCIN 500 MG: 500 INJECTION, POWDER, LYOPHILIZED, FOR SOLUTION INTRAVENOUS at 09:07

## 2022-01-01 RX ADMIN — BUDESONIDE AND FORMOTEROL FUMARATE DIHYDRATE 2 PUFF: 160; 4.5 AEROSOL RESPIRATORY (INHALATION) at 18:58

## 2022-01-01 RX ADMIN — HEPARIN SODIUM 5000 UNITS: 5000 INJECTION, SOLUTION INTRAVENOUS; SUBCUTANEOUS at 13:16

## 2022-01-01 RX ADMIN — QUETIAPINE FUMARATE 300 MG: 100 TABLET ORAL at 20:51

## 2022-01-01 RX ADMIN — BUDESONIDE AND FORMOTEROL FUMARATE DIHYDRATE 2 PUFF: 160; 4.5 AEROSOL RESPIRATORY (INHALATION) at 18:47

## 2022-01-01 RX ADMIN — INSULIN DETEMIR 30 UNITS: 100 INJECTION, SOLUTION SUBCUTANEOUS at 15:53

## 2022-01-01 RX ADMIN — INSULIN LISPRO 5 UNITS: 100 INJECTION, SOLUTION INTRAVENOUS; SUBCUTANEOUS at 22:48

## 2022-01-01 RX ADMIN — QUETIAPINE FUMARATE 25 MG: 25 TABLET ORAL at 09:30

## 2022-01-01 RX ADMIN — Medication 10 ML: at 08:41

## 2022-01-01 RX ADMIN — PIRFENIDONE 267 MG: 267 TABLET, COATED ORAL at 21:31

## 2022-01-01 RX ADMIN — INSULIN LISPRO 5 UNITS: 100 INJECTION, SOLUTION INTRAVENOUS; SUBCUTANEOUS at 13:01

## 2022-01-01 RX ADMIN — BENZONATATE 100 MG: 100 CAPSULE ORAL at 21:31

## 2022-01-01 RX ADMIN — BUMETANIDE 1 MG: 0.25 INJECTION INTRAMUSCULAR; INTRAVENOUS at 15:00

## 2022-01-01 RX ADMIN — IPRATROPIUM BROMIDE AND ALBUTEROL SULFATE 3 ML: 2.5; .5 SOLUTION RESPIRATORY (INHALATION) at 12:59

## 2022-01-01 RX ADMIN — PANTOPRAZOLE SODIUM 40 MG: 40 TABLET, DELAYED RELEASE ORAL at 05:36

## 2022-01-01 RX ADMIN — FLUOXETINE HYDROCHLORIDE 40 MG: 20 CAPSULE ORAL at 09:27

## 2022-01-01 RX ADMIN — PANTOPRAZOLE SODIUM 40 MG: 40 TABLET, DELAYED RELEASE ORAL at 05:08

## 2022-01-01 RX ADMIN — QUETIAPINE FUMARATE 25 MG: 25 TABLET ORAL at 13:51

## 2022-01-01 RX ADMIN — BUDESONIDE AND FORMOTEROL FUMARATE DIHYDRATE 2 PUFF: 160; 4.5 AEROSOL RESPIRATORY (INHALATION) at 20:14

## 2022-01-01 RX ADMIN — PIRFENIDONE 267 MG: 267 TABLET, COATED ORAL at 20:25

## 2022-01-01 RX ADMIN — HEPARIN SODIUM 5000 UNITS: 5000 INJECTION, SOLUTION INTRAVENOUS; SUBCUTANEOUS at 13:00

## 2022-01-01 RX ADMIN — AZITHROMYCIN 250 MG: 500 INJECTION, POWDER, LYOPHILIZED, FOR SOLUTION INTRAVENOUS at 11:34

## 2022-01-01 RX ADMIN — QUETIAPINE FUMARATE 25 MG: 25 TABLET ORAL at 09:26

## 2022-01-01 RX ADMIN — ALPRAZOLAM 0.5 MG: 0.5 TABLET ORAL at 10:18

## 2022-01-01 RX ADMIN — PIRFENIDONE 267 MG: 267 TABLET, COATED ORAL at 08:40

## 2022-01-01 RX ADMIN — PIRFENIDONE 267 MG: 267 TABLET, COATED ORAL at 15:42

## 2022-01-01 RX ADMIN — IPRATROPIUM BROMIDE AND ALBUTEROL SULFATE 3 ML: 2.5; .5 SOLUTION RESPIRATORY (INHALATION) at 20:53

## 2022-01-01 RX ADMIN — WATER 500 MG: 1 INJECTION INTRAMUSCULAR; INTRAVENOUS; SUBCUTANEOUS at 15:54

## 2022-01-01 RX ADMIN — QUETIAPINE FUMARATE 25 MG: 25 TABLET ORAL at 13:00

## 2022-01-01 RX ADMIN — AZITHROMYCIN 500 MG: 500 INJECTION, POWDER, LYOPHILIZED, FOR SOLUTION INTRAVENOUS at 11:51

## 2022-01-01 RX ADMIN — INSULIN LISPRO 7 UNITS: 100 INJECTION, SOLUTION INTRAVENOUS; SUBCUTANEOUS at 08:43

## 2022-01-01 RX ADMIN — FLUOXETINE HYDROCHLORIDE 40 MG: 20 CAPSULE ORAL at 09:30

## 2022-01-01 RX ADMIN — BUDESONIDE AND FORMOTEROL FUMARATE DIHYDRATE 2 PUFF: 160; 4.5 AEROSOL RESPIRATORY (INHALATION) at 20:21

## 2022-01-01 RX ADMIN — HEPARIN SODIUM 5000 UNITS: 5000 INJECTION, SOLUTION INTRAVENOUS; SUBCUTANEOUS at 21:04

## 2022-01-01 RX ADMIN — Medication 1 PATCH: at 22:48

## 2022-01-01 RX ADMIN — INSULIN LISPRO 7 UNITS: 100 INJECTION, SOLUTION INTRAVENOUS; SUBCUTANEOUS at 18:15

## 2022-01-01 RX ADMIN — BUDESONIDE AND FORMOTEROL FUMARATE DIHYDRATE 2 PUFF: 160; 4.5 AEROSOL RESPIRATORY (INHALATION) at 08:37

## 2022-01-01 RX ADMIN — INSULIN DETEMIR 50 UNITS: 100 INJECTION, SOLUTION SUBCUTANEOUS at 08:56

## 2022-01-01 RX ADMIN — IPRATROPIUM BROMIDE AND ALBUTEROL SULFATE 3 ML: 2.5; .5 SOLUTION RESPIRATORY (INHALATION) at 18:47

## 2022-01-01 RX ADMIN — ALPRAZOLAM 0.5 MG: 0.5 TABLET ORAL at 11:07

## 2022-01-01 RX ADMIN — INSULIN LISPRO 5 UNITS: 100 INJECTION, SOLUTION INTRAVENOUS; SUBCUTANEOUS at 03:07

## 2022-01-01 RX ADMIN — INSULIN LISPRO 7 UNITS: 100 INJECTION, SOLUTION INTRAVENOUS; SUBCUTANEOUS at 09:28

## 2022-01-01 RX ADMIN — DIVALPROEX SODIUM 750 MG: 500 TABLET, DELAYED RELEASE ORAL at 08:40

## 2022-01-01 RX ADMIN — IPRATROPIUM BROMIDE AND ALBUTEROL SULFATE 3 ML: 2.5; .5 SOLUTION RESPIRATORY (INHALATION) at 13:40

## 2022-01-01 RX ADMIN — FLUOXETINE HYDROCHLORIDE 40 MG: 20 CAPSULE ORAL at 08:52

## 2022-01-01 RX ADMIN — DIVALPROEX SODIUM 750 MG: 500 TABLET, DELAYED RELEASE ORAL at 09:07

## 2022-01-01 RX ADMIN — IPRATROPIUM BROMIDE AND ALBUTEROL SULFATE 3 ML: 2.5; .5 SOLUTION RESPIRATORY (INHALATION) at 12:55

## 2022-01-01 RX ADMIN — INSULIN LISPRO 3 UNITS: 100 INJECTION, SOLUTION INTRAVENOUS; SUBCUTANEOUS at 05:12

## 2022-01-01 RX ADMIN — Medication 10 ML: at 21:45

## 2022-01-01 RX ADMIN — INSULIN LISPRO 4 UNITS: 100 INJECTION, SOLUTION INTRAVENOUS; SUBCUTANEOUS at 12:22

## 2022-01-01 RX ADMIN — DIVALPROEX SODIUM 750 MG: 500 TABLET, DELAYED RELEASE ORAL at 20:24

## 2022-01-01 RX ADMIN — Medication 10 ML: at 21:33

## 2022-01-01 RX ADMIN — BUDESONIDE AND FORMOTEROL FUMARATE DIHYDRATE 2 PUFF: 160; 4.5 AEROSOL RESPIRATORY (INHALATION) at 07:41

## 2022-01-01 RX ADMIN — INSULIN LISPRO 14 UNITS: 100 INJECTION, SOLUTION INTRAVENOUS; SUBCUTANEOUS at 09:07

## 2022-01-01 RX ADMIN — Medication 10 ML: at 08:53

## 2022-01-01 RX ADMIN — IPRATROPIUM BROMIDE AND ALBUTEROL SULFATE 3 ML: 2.5; .5 SOLUTION RESPIRATORY (INHALATION) at 07:41

## 2022-01-01 RX ADMIN — ALPRAZOLAM 0.5 MG: 0.5 TABLET ORAL at 17:48

## 2022-01-01 RX ADMIN — QUETIAPINE FUMARATE 300 MG: 100 TABLET ORAL at 20:08

## 2022-01-01 RX ADMIN — HEPARIN SODIUM 5000 UNITS: 5000 INJECTION, SOLUTION INTRAVENOUS; SUBCUTANEOUS at 05:36

## 2022-01-01 RX ADMIN — SODIUM CHLORIDE 250 ML: 9 INJECTION, SOLUTION INTRAVENOUS at 01:17

## 2022-01-01 RX ADMIN — IPRATROPIUM BROMIDE AND ALBUTEROL SULFATE 3 ML: 2.5; .5 SOLUTION RESPIRATORY (INHALATION) at 13:26

## 2022-01-01 RX ADMIN — INSULIN DETEMIR 40 UNITS: 100 INJECTION, SOLUTION SUBCUTANEOUS at 09:31

## 2022-01-01 RX ADMIN — QUETIAPINE FUMARATE 300 MG: 100 TABLET ORAL at 21:31

## 2022-01-01 RX ADMIN — PIRFENIDONE 267 MG: 267 TABLET, COATED ORAL at 20:07

## 2022-01-01 RX ADMIN — DIVALPROEX SODIUM 750 MG: 500 TABLET, DELAYED RELEASE ORAL at 08:44

## 2022-01-01 RX ADMIN — ALPRAZOLAM 0.5 MG: 0.5 TABLET ORAL at 08:53

## 2022-01-01 RX ADMIN — Medication 1 PATCH: at 23:35

## 2022-01-01 RX ADMIN — Medication 10 ML: at 09:28

## 2022-01-01 RX ADMIN — INSULIN LISPRO 12 UNITS: 100 INJECTION, SOLUTION INTRAVENOUS; SUBCUTANEOUS at 16:10

## 2022-01-01 RX ADMIN — GUAIFENESIN AND DEXTROMETHORPHAN 5 ML: 100; 10 SYRUP ORAL at 21:32

## 2022-01-01 RX ADMIN — LISINOPRIL 5 MG: 5 TABLET ORAL at 09:27

## 2022-01-01 RX ADMIN — BENZONATATE 100 MG: 100 CAPSULE ORAL at 11:06

## 2022-01-01 RX ADMIN — IPRATROPIUM BROMIDE AND ALBUTEROL SULFATE 3 ML: 2.5; .5 SOLUTION RESPIRATORY (INHALATION) at 20:07

## 2022-01-01 RX ADMIN — IPRATROPIUM BROMIDE AND ALBUTEROL SULFATE 3 ML: 2.5; .5 SOLUTION RESPIRATORY (INHALATION) at 18:58

## 2022-01-01 RX ADMIN — PREDNISONE 60 MG: 20 TABLET ORAL at 09:29

## 2022-01-01 RX ADMIN — IPRATROPIUM BROMIDE AND ALBUTEROL SULFATE 3 ML: 2.5; .5 SOLUTION RESPIRATORY (INHALATION) at 19:23

## 2022-01-01 RX ADMIN — INSULIN DETEMIR 20 UNITS: 100 INJECTION, SOLUTION SUBCUTANEOUS at 21:32

## 2022-01-01 RX ADMIN — Medication 10 ML: at 21:32

## 2022-01-01 RX ADMIN — GUAIFENESIN AND DEXTROMETHORPHAN 5 ML: 100; 10 SYRUP ORAL at 17:04

## 2022-01-01 RX ADMIN — SODIUM CHLORIDE 75 ML/HR: 9 INJECTION, SOLUTION INTRAVENOUS at 02:26

## 2022-01-01 RX ADMIN — PANTOPRAZOLE SODIUM 40 MG: 40 TABLET, DELAYED RELEASE ORAL at 06:41

## 2022-01-01 RX ADMIN — INSULIN DETEMIR 50 UNITS: 100 INJECTION, SOLUTION SUBCUTANEOUS at 09:06

## 2022-01-01 RX ADMIN — ALPRAZOLAM 0.5 MG: 0.5 TABLET ORAL at 15:00

## 2022-01-01 RX ADMIN — DIVALPROEX SODIUM 750 MG: 500 TABLET, DELAYED RELEASE ORAL at 20:50

## 2022-01-01 RX ADMIN — IPRATROPIUM BROMIDE AND ALBUTEROL SULFATE 3 ML: 2.5; .5 SOLUTION RESPIRATORY (INHALATION) at 20:20

## 2022-01-01 RX ADMIN — DIVALPROEX SODIUM 750 MG: 500 TABLET, DELAYED RELEASE ORAL at 09:26

## 2022-01-01 RX ADMIN — INSULIN LISPRO 12 UNITS: 100 INJECTION, SOLUTION INTRAVENOUS; SUBCUTANEOUS at 15:38

## 2022-01-01 RX ADMIN — QUETIAPINE FUMARATE 300 MG: 100 TABLET ORAL at 22:49

## 2022-01-01 RX ADMIN — SODIUM CHLORIDE 500 MG: 900 INJECTION INTRAVENOUS at 09:11

## 2022-01-01 RX ADMIN — INSULIN DETEMIR 10 UNITS: 100 INJECTION, SOLUTION SUBCUTANEOUS at 22:00

## 2022-01-01 RX ADMIN — Medication 1 PATCH: at 22:00

## 2022-01-01 RX ADMIN — INSULIN LISPRO 7 UNITS: 100 INJECTION, SOLUTION INTRAVENOUS; SUBCUTANEOUS at 12:22

## 2022-01-01 RX ADMIN — FAMOTIDINE 40 MG: 20 TABLET ORAL at 17:57

## 2022-01-01 RX ADMIN — PIRFENIDONE 267 MG: 267 TABLET, COATED ORAL at 17:17

## 2022-01-01 RX ADMIN — INSULIN LISPRO 3 UNITS: 100 INJECTION, SOLUTION INTRAVENOUS; SUBCUTANEOUS at 23:41

## 2022-01-01 RX ADMIN — SULFAMETHOXAZOLE AND TRIMETHOPRIM 1 TABLET: 800; 160 TABLET ORAL at 09:27

## 2022-01-01 RX ADMIN — PIRFENIDONE 267 MG: 267 TABLET, COATED ORAL at 15:20

## 2022-01-01 RX ADMIN — INSULIN DETEMIR 50 UNITS: 100 INJECTION, SOLUTION SUBCUTANEOUS at 09:29

## 2022-01-01 RX ADMIN — PIRFENIDONE 267 MG: 267 TABLET, COATED ORAL at 20:50

## 2022-01-01 RX ADMIN — HEPARIN SODIUM 5000 UNITS: 5000 INJECTION, SOLUTION INTRAVENOUS; SUBCUTANEOUS at 06:50

## 2022-01-01 RX ADMIN — SODIUM CHLORIDE 500 MG: 900 INJECTION INTRAVENOUS at 08:48

## 2022-01-01 RX ADMIN — LISINOPRIL 20 MG: 20 TABLET ORAL at 09:06

## 2022-01-01 RX ADMIN — QUETIAPINE FUMARATE 300 MG: 100 TABLET ORAL at 20:24

## 2022-01-01 RX ADMIN — SODIUM CHLORIDE 1 G: 900 INJECTION INTRAVENOUS at 08:45

## 2022-01-01 RX ADMIN — INSULIN LISPRO 8 UNITS: 100 INJECTION, SOLUTION INTRAVENOUS; SUBCUTANEOUS at 21:45

## 2022-01-01 RX ADMIN — INSULIN LISPRO 10 UNITS: 100 INJECTION, SOLUTION INTRAVENOUS; SUBCUTANEOUS at 17:35

## 2022-01-01 RX ADMIN — HEPARIN SODIUM 5000 UNITS: 5000 INJECTION, SOLUTION INTRAVENOUS; SUBCUTANEOUS at 15:42

## 2022-01-01 RX ADMIN — CEFTRIAXONE 2 G: 2 INJECTION, POWDER, FOR SOLUTION INTRAMUSCULAR; INTRAVENOUS at 16:10

## 2022-01-01 RX ADMIN — INSULIN LISPRO 7 UNITS: 100 INJECTION, SOLUTION INTRAVENOUS; SUBCUTANEOUS at 12:05

## 2022-01-01 RX ADMIN — IPRATROPIUM BROMIDE AND ALBUTEROL SULFATE 3 ML: 2.5; .5 SOLUTION RESPIRATORY (INHALATION) at 06:45

## 2022-01-01 RX ADMIN — PANTOPRAZOLE SODIUM 40 MG: 40 TABLET, DELAYED RELEASE ORAL at 08:38

## 2022-01-01 RX ADMIN — HEPARIN SODIUM 5000 UNITS: 5000 INJECTION, SOLUTION INTRAVENOUS; SUBCUTANEOUS at 06:18

## 2022-01-01 RX ADMIN — HEPARIN SODIUM 5000 UNITS: 5000 INJECTION, SOLUTION INTRAVENOUS; SUBCUTANEOUS at 14:15

## 2022-01-01 RX ADMIN — PIRFENIDONE 267 MG: 267 TABLET, COATED ORAL at 21:36

## 2022-01-01 RX ADMIN — PIRFENIDONE 267 MG: 267 TABLET, COATED ORAL at 08:51

## 2022-01-01 RX ADMIN — IPRATROPIUM BROMIDE AND ALBUTEROL SULFATE 3 ML: 2.5; .5 SOLUTION RESPIRATORY (INHALATION) at 13:20

## 2022-01-01 RX ADMIN — IPRATROPIUM BROMIDE AND ALBUTEROL SULFATE 3 ML: 2.5; .5 SOLUTION RESPIRATORY (INHALATION) at 07:20

## 2022-01-01 RX ADMIN — INSULIN LISPRO 10 UNITS: 100 INJECTION, SOLUTION INTRAVENOUS; SUBCUTANEOUS at 11:57

## 2022-01-01 RX ADMIN — INSULIN LISPRO 4 UNITS: 100 INJECTION, SOLUTION INTRAVENOUS; SUBCUTANEOUS at 17:46

## 2022-01-01 RX ADMIN — INSULIN LISPRO 4 UNITS: 100 INJECTION, SOLUTION INTRAVENOUS; SUBCUTANEOUS at 08:38

## 2022-01-01 RX ADMIN — INSULIN LISPRO 8 UNITS: 100 INJECTION, SOLUTION INTRAVENOUS; SUBCUTANEOUS at 15:42

## 2022-01-01 RX ADMIN — GUAIFENESIN AND DEXTROMETHORPHAN 5 ML: 100; 10 SYRUP ORAL at 17:48

## 2022-01-01 RX ADMIN — INSULIN LISPRO 5 UNITS: 100 INJECTION, SOLUTION INTRAVENOUS; SUBCUTANEOUS at 09:11

## 2022-01-01 RX ADMIN — INSULIN LISPRO 8 UNITS: 100 INJECTION, SOLUTION INTRAVENOUS; SUBCUTANEOUS at 00:53

## 2022-01-01 RX ADMIN — INSULIN LISPRO 5 UNITS: 100 INJECTION, SOLUTION INTRAVENOUS; SUBCUTANEOUS at 17:45

## 2022-01-01 RX ADMIN — QUETIAPINE FUMARATE 25 MG: 25 TABLET ORAL at 09:06

## 2022-01-01 RX ADMIN — PIRFENIDONE 267 MG: 267 TABLET, COATED ORAL at 09:30

## 2022-01-01 RX ADMIN — INSULIN LISPRO 5 UNITS: 100 INJECTION, SOLUTION INTRAVENOUS; SUBCUTANEOUS at 00:44

## 2022-01-01 RX ADMIN — FLUOXETINE HYDROCHLORIDE 40 MG: 20 CAPSULE ORAL at 09:06

## 2022-01-01 RX ADMIN — INSULIN LISPRO 9 UNITS: 100 INJECTION, SOLUTION INTRAVENOUS; SUBCUTANEOUS at 09:09

## 2022-01-01 RX ADMIN — ALPRAZOLAM 0.5 MG: 0.5 TABLET ORAL at 20:08

## 2022-01-01 RX ADMIN — INSULIN LISPRO 12 UNITS: 100 INJECTION, SOLUTION INTRAVENOUS; SUBCUTANEOUS at 18:17

## 2022-01-01 RX ADMIN — GUAIFENESIN AND DEXTROMETHORPHAN 5 ML: 100; 10 SYRUP ORAL at 10:18

## 2022-01-01 RX ADMIN — IPRATROPIUM BROMIDE AND ALBUTEROL SULFATE 3 ML: 2.5; .5 SOLUTION RESPIRATORY (INHALATION) at 20:13

## 2022-01-01 RX ADMIN — SODIUM CHLORIDE 500 MG: 900 INJECTION INTRAVENOUS at 08:54

## 2022-01-01 RX ADMIN — PIRFENIDONE 267 MG: 267 TABLET, COATED ORAL at 09:08

## 2022-01-01 RX ADMIN — GUAIFENESIN AND DEXTROMETHORPHAN 5 ML: 100; 10 SYRUP ORAL at 09:01

## 2022-01-01 RX ADMIN — HEPARIN SODIUM 5000 UNITS: 5000 INJECTION, SOLUTION INTRAVENOUS; SUBCUTANEOUS at 20:24

## 2022-01-01 RX ADMIN — SODIUM CHLORIDE 1 G: 900 INJECTION INTRAVENOUS at 09:25

## 2022-01-01 RX ADMIN — DIVALPROEX SODIUM 750 MG: 500 TABLET, DELAYED RELEASE ORAL at 21:31

## 2022-01-01 RX ADMIN — QUETIAPINE FUMARATE 25 MG: 25 TABLET ORAL at 13:54

## 2022-01-01 RX ADMIN — IPRATROPIUM BROMIDE AND ALBUTEROL SULFATE 3 ML: 2.5; .5 SOLUTION RESPIRATORY (INHALATION) at 06:50

## 2022-01-01 RX ADMIN — INSULIN LISPRO 7 UNITS: 100 INJECTION, SOLUTION INTRAVENOUS; SUBCUTANEOUS at 17:35

## 2022-01-01 RX ADMIN — ALPRAZOLAM 0.5 MG: 0.5 TABLET ORAL at 09:22

## 2022-01-01 RX ADMIN — Medication 10 ML: at 09:08

## 2022-01-01 RX ADMIN — Medication 10 ML: at 20:10

## 2022-01-01 RX ADMIN — Medication 10 ML: at 08:44

## 2022-01-01 RX ADMIN — IPRATROPIUM BROMIDE AND ALBUTEROL SULFATE 3 ML: 2.5; .5 SOLUTION RESPIRATORY (INHALATION) at 08:37

## 2022-01-01 RX ADMIN — BENZONATATE 100 MG: 100 CAPSULE ORAL at 20:10

## 2022-01-01 RX ADMIN — INSULIN LISPRO 7 UNITS: 100 INJECTION, SOLUTION INTRAVENOUS; SUBCUTANEOUS at 18:17

## 2022-01-01 RX ADMIN — PIRFENIDONE 267 MG: 267 TABLET, COATED ORAL at 15:00

## 2022-01-01 RX ADMIN — BUMETANIDE 1 MG: 0.25 INJECTION, SOLUTION INTRAMUSCULAR; INTRAVENOUS at 17:16

## 2022-01-01 RX ADMIN — SODIUM CHLORIDE 500 MG: 900 INJECTION INTRAVENOUS at 20:24

## 2022-01-01 RX ADMIN — IPRATROPIUM BROMIDE AND ALBUTEROL SULFATE 3 ML: 2.5; .5 SOLUTION RESPIRATORY (INHALATION) at 14:23

## 2022-01-01 RX ADMIN — QUETIAPINE FUMARATE 25 MG: 25 TABLET ORAL at 08:38

## 2022-01-01 RX ADMIN — INSULIN LISPRO 5 UNITS: 100 INJECTION, SOLUTION INTRAVENOUS; SUBCUTANEOUS at 11:57

## 2022-01-01 RX ADMIN — BUDESONIDE AND FORMOTEROL FUMARATE DIHYDRATE 2 PUFF: 160; 4.5 AEROSOL RESPIRATORY (INHALATION) at 20:53

## 2022-01-01 RX ADMIN — SODIUM CHLORIDE 1 G: 900 INJECTION INTRAVENOUS at 09:07

## 2022-01-01 RX ADMIN — FLUOXETINE HYDROCHLORIDE 40 MG: 20 CAPSULE ORAL at 08:38

## 2022-01-01 RX ADMIN — GUAIFENESIN AND DEXTROMETHORPHAN 5 ML: 100; 10 SYRUP ORAL at 20:51

## 2022-01-01 RX ADMIN — GUAIFENESIN AND DEXTROMETHORPHAN 5 ML: 100; 10 SYRUP ORAL at 09:21

## 2022-01-01 RX ADMIN — QUETIAPINE FUMARATE 25 MG: 25 TABLET ORAL at 14:15

## 2022-01-01 RX ADMIN — BENZONATATE 100 MG: 100 CAPSULE ORAL at 18:08

## 2022-01-01 RX ADMIN — AZITHROMYCIN 250 MG: 500 INJECTION, POWDER, LYOPHILIZED, FOR SOLUTION INTRAVENOUS at 11:14

## 2022-01-01 RX ADMIN — SODIUM CHLORIDE 1 G: 900 INJECTION INTRAVENOUS at 09:33

## 2022-01-01 RX ADMIN — PIRFENIDONE 267 MG: 267 TABLET, COATED ORAL at 16:55

## 2022-01-01 RX ADMIN — BUDESONIDE AND FORMOTEROL FUMARATE DIHYDRATE 2 PUFF: 160; 4.5 AEROSOL RESPIRATORY (INHALATION) at 07:20

## 2022-01-01 RX ADMIN — QUETIAPINE FUMARATE 25 MG: 25 TABLET ORAL at 08:44

## 2022-01-01 RX ADMIN — DIVALPROEX SODIUM 750 MG: 500 TABLET, DELAYED RELEASE ORAL at 21:32

## 2022-05-06 PROBLEM — R09.02 HYPOXIA: Status: ACTIVE | Noted: 2022-01-01

## 2022-05-06 NOTE — H&P
"    [facility] Medicine Services  HISTORY AND PHYSICAL    Patient Name: Melchor Marie  : 1970  MRN: [id number]  Primary Care Physician: Sylvia Parker PA  Date of admission: 2022      Subjective   Subjective     Chief Complaint:  Cough, dyspnea    HPI:  Melchor Marie is a 51 y.o. male who states he has noticed increased frequency of cough and shortness of breath over the last 48 hours.  He states that his oxygen saturations have been \"even down into the 50s\" at times over the last 2 to 3 days.  He was seen in the [facility] facility last night and had a CT scan of the chest performed; he was discharged with antibiotics but felt worse this morning and went to see his PCP.  He was hypoxic there as well and was therefore instructed to come to the [facility] ED.  The ED physician tells me that the patient's CT scan and [place] did indeed reveal groundglass opacities consistent with multifocal pneumonia (I cannot access those images).  The patient does confirm production of yellow sputum with a cough, no candelario blood.  He also confirms some periods of chills but did not take a temperature at home.  He denies anosmia/ageusia, chest pain, nausea/emesis, abdominal pain, focal neurologic deficit, or syncope.  His medical history is significant for hypertension, type 2 diabetes, anxiety/depression, and pulmonary fibrosis for which she sees a pulmonologist in [place].      Review of Systems   Constitutional: Negative.    HENT: Negative.    Eyes: Negative.    Respiratory: Positive for cough and shortness of breath.    Cardiovascular: Negative.    Gastrointestinal: Negative.    Endocrine: Negative.    Genitourinary: Negative.    Musculoskeletal: Negative.    Skin: Negative.    Neurological: Negative.    Psychiatric/Behavioral: Negative.            Personal History     Past Medical History:   Diagnosis Date   • Chest pain    • Cholelithiasis    • " "Hyperlipidemia    • Hypertension    • Nerve damage     tooth pick injury   • Tobacco use    • Type 2 diabetes mellitus (HCC)              Past Surgical History:   Procedure Laterality Date   • CHOLECYSTECTOMY     • ENDOSCOPY N/A 7/27/2021    Procedure: ESOPHAGOGASTRODUODENOSCOPY;  Surgeon: Servando Tyler MD;  Location: [facility] ENDOSCOPY;  Service: Gastroenterology;  Laterality: N/A;   • FOOT SURGERY Right        Family History:  family history includes Coronary artery disease (age of onset: 52) in his mother; Diabetes in his father and another family member. Otherwise pertinent FHx was reviewed and unremarkable.     Social History:  reports that he has been smoking cigarettes. He has been smoking about 0.50 packs per day. He has never used smokeless tobacco. He reports previous alcohol use. He reports that he does not use drugs.  Social History     Social History Narrative   • Not on file       Medications:  Available home medication information reviewed.  (Not in a hospital admission)      No Known Allergies    Objective   Objective     Vital Signs:   Temp:  [98.7 °F (37.1 °C)] 98.7 °F (37.1 °C)  Heart Rate:  [] 86  Resp:  [16-24] 24  BP: (116-126)/(76-84) 116/76  Flow (L/min):  [8-15] 15       Physical Exam   Constitutional: Awake, alert  Eyes: PERRLA, sclerae anicteric, no conjunctival injection  HENT: NCAT, mucous membranes moist  Neck: Supple, no thyromegaly, no lymphadenopathy, trachea midline  Respiratory: Wheezes bilaterally, \"distant\" breath sounds, nonlabored respirations   Cardiovascular: RRR, no murmurs, rubs, or gallops, palpable pedal pulses bilaterally  Gastrointestinal: Positive bowel sounds, soft, nontender, nondistended  Musculoskeletal: No bilateral ankle edema, no clubbing or cyanosis to extremities  Psychiatric: Appropriate affect, cooperative  Neurologic: Oriented x 3, strength symmetric in all extremities, Cranial Nerves grossly intact to confrontation, speech clear  Skin: No rashes, " normal turgor.    Result Review:  I have personally reviewed the results from the time of this admission to 5/6/2022 16:21 EDT and agree with these findings:  [x]  Laboratory  []  Microbiology  []  Radiology  []  EKG/Telemetry   []  Cardiology/Vascular   []  Pathology  []  Old records  []  Other:  Most notable findings include: I personally reviewed chest x-ray which is a single AP view of the chest and by my read shows diffuse increased interstitial markings consistent with the patient's reported history of pulmonary fibrosis, also increased opacity in the left upper lobe and right lower lobe.  I also personally reviewed EKG which by my read shows normal sinus rhythm, ventricular rate between  bpm, nonspecific ST/T wave changes.  Sodium 131, chloride 90, WBC 11.97, lactate 2.8      LAB RESULTS:      Lab 05/06/22  1358   WBC 11.97*   HEMOGLOBIN 17.3   HEMATOCRIT 50.7   PLATELETS 310   NEUTROS ABS 9.28*   IMMATURE GRANS (ABS) 0.04   LYMPHS ABS 1.68   MONOS ABS 0.93*   EOS ABS 0.00   MCV 90.5   PROCALCITONIN 0.09   LACTATE 2.8*         Lab 05/06/22  1358   SODIUM 131*   POTASSIUM 4.5   CHLORIDE 90*   CO2 26.0   ANION GAP 15.0   BUN 15   CREATININE 0.84   EGFR 106.2   GLUCOSE 351*   CALCIUM 9.8         Lab 05/06/22  1358   TOTAL PROTEIN 7.8   ALBUMIN 4.00   GLOBULIN 3.8   ALT (SGPT) 29   AST (SGOT) 32   BILIRUBIN 0.4   ALK PHOS 144*         Lab 05/06/22  1358   PROBNP 1,824.0*   TROPONIN T <0.010                 UA    Urinalysis 7/26/21 7/26/21    1505 1505   Squamous Epithelial Cells, UA  0-2   Specific Gravity, UA 1.049 (A)    Ketones, UA 80 mg/dL (3+) (A)    Blood, UA Negative    Leukocytes, UA Negative    Nitrite, UA Negative    RBC, UA  0-2   WBC, UA  0-2   Bacteria, UA  None Seen   (A) Abnormal value              Microbiology Results (last 10 days)     Procedure Component Value - Date/Time    COVID PRE-OP / PRE-PROCEDURE SCREENING ORDER (NO ISOLATION) - Swab, Nasopharynx [[id number]]  (Normal) Collected:  05/06/22 1418    Lab Status: Final result Specimen: Swab from Nasopharynx Updated: 05/06/22 1445    Narrative:      The following orders were created for panel order COVID PRE-OP / PRE-PROCEDURE SCREENING ORDER (NO ISOLATION) - Swab, Nasopharynx.  Procedure                               Abnormality         Status                     ---------                               -----------         ------                     COVID-19 and FLU A/B PCR...[[id number]]  Normal              Final result                 Please view results for these tests on the individual orders.    COVID-19 and FLU A/B PCR - Swab, Nasopharynx [[id number]]  (Normal) Collected: 05/06/22 1418    Lab Status: Final result Specimen: Swab from Nasopharynx Updated: 05/06/22 1445     COVID19 Not Detected     Influenza A PCR Not Detected     Influenza B PCR Not Detected    Narrative:      Fact sheet for providers: https://www.fda.gov/media/838485/download    Fact sheet for patients: https://www.fda.gov/media/148175/download    Test performed by PCR.          XR Chest 1 View    Result Date: 5/6/2022  DATE OF EXAM: 5/6/2022 1:58 PM  PROCEDURE: XR CHEST 1 VW-  INDICATIONS: SOA triage protocol  COMPARISON: 8/10/2021  TECHNIQUE: Single radiographic AP view of the chest was obtained.  FINDINGS: Heart shadow is normal in size. Vasculature appears normal. Diffuse and extensive reticular interstitial disease throughout the lungs appears to be increased from the prior study, although in part this is due to lower lung volumes. There appears to be focally increased disease in the left upper lung out of proportion to changes elsewhere, at least potentially a superimposed pneumonia. No dense lung consolidation effusion or pneumothorax is seen.      Impression:  1. Extensive bilateral pulmonary interstitial disease, similar to 8/10/2021 and presumably representing fibrosis. 2. Generalized worsening appearance of the chest in part due to low lung volumes. 3.  Asymmetrically denser disease of the left upper lung may represent a superimposed pneumonia.  This report was finalized on 5/6/2022 2:05 PM by Dr. Arnold Sr MD.        Results for orders placed in visit on 05/26/21    Echocardiogram Scan      Assessment/Plan   Assessment & Plan     Active Hospital Problems    Diagnosis  POA   • Hypoxia [R09.02]  Yes       A/P: 51M with bilateral pneumonia and history of pulmonary fibrosis.    Bilateral pneumonia  History of pulmonary fibrosis  Hypoxia  - He uses 8L of O2 by nasal cannula at home; currently on nonrebreather in the ED with good saturations.  - Titrate O2 delivery to baseline level.  - IV antibiotic coverage with ceftriaxone and azithromycin.  - DuoNeb treatments every 6 hours scheduled, can have every 4 hours as needed.  - Urine for Legionella and Strep pneumo antigen.  - Sputum for culture and Gram stain  - Pulmonology consult requested, input appreciated.  - Continue pirfenidone from home regimen (patient may use his own supply of medication for this).  - Continue Symbicort.  - He is a former smoker, okay to continue transdermal nicotine.    Type 2 diabetes  - Start Levemir  - SSI coverage, AC/at bedtime  - Continue metformin.    Hypertension  - Continue lisinopril.    Anxiety/depression  - Continue fluoxetine, Depakote.    DVT prophylaxis:  SCDs      CODE STATUS:  Full  Code Status and Medical Interventions:   Ordered at: 05/06/22 2679     Level Of Support Discussed With:    Patient     Code Status (Patient has no pulse and is not breathing):    CPR (Attempt to Resuscitate)     Medical Interventions (Patient has pulse or is breathing):    Full Support         Ej Boyce III, DO  05/06/22

## 2022-05-06 NOTE — ED PROVIDER NOTES
Subjective   51-year-old male presents emerged part with shortness of breath and referral from his physician's office for admission    Patient reports he had cough congestion productive yellow sputum yesterday.  He followed up at the ER in [place] and had a CT angiogram of his chest done by his report showing no blood clot.  He was called by his PCP today and told that he had pneumonia in multiple areas of his lungs and need to be admitted here at [facility].  He was referred to the ED for evaluation    Patient reports continued cough productive of yellow sputum but no blood or mucus.  He reports significantly increased shortness of breath from his baseline.  He does have pulmonary fibrosis on 8 L of O2 by nasal cannula at home on a regular basis.  He has no fever chills loss of taste or smell or COVID exposure that he knows of.  He does report worsening dyspnea on exertion.  He has no chest pain or palpitations.  He denies any abdominal pain nausea vomiting or diarrhea.  He has no dysuria frequency urgency or hematuria    He has had no history of PE or DVT and denies any leg pain redness or swelling currently.    He has a history of smoking but quit in the past.  He snuck a couple cigarettes a month ago but reports he is a completely non-smoker now          Review of Systems   Constitutional: Negative.  Negative for chills and fever.   HENT: Negative.    Eyes: Negative.    Respiratory: Positive for cough and shortness of breath.    Cardiovascular: Negative.  Negative for chest pain and palpitations.   Gastrointestinal: Negative.  Negative for abdominal pain.   Genitourinary: Negative.  Negative for dysuria and hematuria.   Skin: Negative.    Neurological: Negative.    All other systems reviewed and are negative.      Past Medical History:   Diagnosis Date   • Chest pain    • Cholelithiasis    • Hyperlipidemia    • Hypertension    • Nerve damage     tooth pick injury   • Tobacco use    • Type 2 diabetes  mellitus (HCC)        No Known Allergies    Past Surgical History:   Procedure Laterality Date   • CHOLECYSTECTOMY     • ENDOSCOPY N/A 7/27/2021    Procedure: ESOPHAGOGASTRODUODENOSCOPY;  Surgeon: Servando Tyler MD;  Location: [facility] ENDOSCOPY;  Service: Gastroenterology;  Laterality: N/A;   • FOOT SURGERY Right        Family History   Problem Relation Age of Onset   • Coronary artery disease Mother 52        CABG*47   • Diabetes Father    • Diabetes Other        Social History     Socioeconomic History   • Marital status:    Tobacco Use   • Smoking status: Heavy Tobacco Smoker     Packs/day: 0.50     Types: Cigarettes   • Smokeless tobacco: Never Used   Vaping Use   • Vaping Use: Never used   Substance and Sexual Activity   • Alcohol use: Not Currently   • Drug use: Never   • Sexual activity: Defer           Objective   Physical Exam  Vitals and nursing note reviewed.   Constitutional:       General: He is not in acute distress.     Comments: Patient is alert and oriented, cooperative but in mild respiratory distress   HENT:      Head: Normocephalic and atraumatic.      Nose: Nose normal.   Eyes:      Conjunctiva/sclera: Conjunctivae normal.   Cardiovascular:      Rate and Rhythm: Normal rate and regular rhythm.      Heart sounds: Normal heart sounds.   Pulmonary:      Effort: Pulmonary effort is normal. Tachypnea present.      Breath sounds: Examination of the right-lower field reveals rales. Examination of the left-lower field reveals rales. Decreased breath sounds and rales present. No wheezing.   Chest:      Chest wall: No tenderness.   Abdominal:      General: Bowel sounds are normal. There is no distension.      Palpations: Abdomen is soft.      Tenderness: There is no abdominal tenderness.   Musculoskeletal:         General: Normal range of motion.      Cervical back: Normal range of motion.      Right lower leg: No tenderness. No edema.      Left lower leg: No tenderness. No edema.      Comments:  No stigmata of DVT   Skin:     General: Skin is warm and dry.      Findings: No rash.   Neurological:      General: No focal deficit present.      Mental Status: He is alert.         Procedures           ED Course  ED Course as of 05/06/22 1516   Fri May 06, 2022   1503 Patient is treated with a DuoNeb.  White count is 11.9.  Glucose is elevated.  COVID and flu PCR is negative    I received the patient's chart from last night.  CT is negative for acute pulmonary thromboembolism however it shows his chronic extensive fibrotic changes with also superimposed groundglass opacities concerning for multifocal infections. []   1506 I talked to Dr. Marie's primary care physician in [place] who saw and evaluated him today and who referred him to the ED.  She has asked that he be admitted.  We discussed the CT from yesterday as well and will call our hospitalist for admission []   1515 I discussed case with Dr. Boyce will admit for definitive inpatient care.  We discussed antibiotics and will proceed with Rocephin and azithromycin.  We discussed his follow-up with pulmonary medicine here at [facility].  Sat was 83% on arrival [HH]      ED Course User Index  [HH] Robert Foster MD                                                 [facility]    Final diagnoses:   Hypoxia   Multifocal pneumonia   Shortness of breath   Referred by primary care physician       ED Disposition  ED Disposition     ED Disposition   Decision to Admit    Condition   --    Comment   --             No follow-up provider specified.       Medication List      No changes were made to your prescriptions during this visit.          Robert Foster MD  05/06/22 1516

## 2022-05-07 PROBLEM — J96.21 ACUTE ON CHRONIC RESPIRATORY FAILURE WITH HYPOXIA: Status: ACTIVE | Noted: 2022-01-01

## 2022-05-07 PROBLEM — R93.89 ABNORMAL CHEST X-RAY: Status: ACTIVE | Noted: 2022-01-01

## 2022-05-07 PROBLEM — J84.10 PULMONARY FIBROSIS (HCC): Status: ACTIVE | Noted: 2022-01-01

## 2022-05-07 NOTE — PROGRESS NOTES
[facility] Medicine Services  PROGRESS NOTE    Patient Name: Melchor Marie  : 1970  MRN: [id number]    Date of Admission: 2022  Primary Care Physician: Sylvia Parker PA    Subjective   Subjective     CC:  Hypoxia, dyspnea    HPI:  He endorses general weakness and fatigue.  Breathing status is about the same from yesterday.  He he is now on high flow.  I talked to him about possibility of intubation and he is not really sure if he wants this right now.  He is going to think about it    ROS:  Gen- No fevers, chills  CV-intermittent chest soreness  Resp-dyspnea as above  GI- No N/V/D, abd pain        Objective   Objective     Vital Signs:   Temp:  [94.6 °F (34.8 °C)-98.7 °F (37.1 °C)] 94.6 °F (34.8 °C)  Heart Rate:  [] 82  Resp:  [16-30] 28  BP: (104-131)/(64-84) 113/79  Flow (L/min):  [8-60] 60     Physical Exam:  Constitutional: Appears to be fatigued and in slight distress, awake, alert  HENT: NCAT, mucous membranes moist  Respiratory: On high flow nasal cannula, poor respiratory effort, slight bilateral wheezing  Cardiovascular: RRR, no murmurs, rubs, or gallops  Gastrointestinal: Positive bowel sounds, soft, nontender, nondistended  Musculoskeletal: No bilateral ankle edema  Psychiatric: Appropriate affect, cooperative  Neurologic: Oriented x 3, strength symmetric in all extremities, Cranial Nerves grossly intact to confrontation, speech clear  Skin: No rashes      Results Reviewed:  LAB RESULTS:      Lab 22  0725 22  1742 22  1358   WBC 7.04  --  11.97*   HEMOGLOBIN 14.7  --  17.3   HEMATOCRIT 44.5  --  50.7   PLATELETS 220  --  310   NEUTROS ABS 5.05  --  9.28*   IMMATURE GRANS (ABS) 0.02  --  0.04   LYMPHS ABS 1.42  --  1.68   MONOS ABS 0.52  --  0.93*   EOS ABS 0.00  --  0.00   MCV 94.7  --  90.5   PROCALCITONIN  --   --  0.09   LACTATE  --  1.6 2.8*         Lab 22  0725 22  1358   SODIUM 138 131*   POTASSIUM 4.3 4.5    CHLORIDE 98 90*   CO2 29.0 26.0   ANION GAP 11.0 15.0   BUN 15 15   CREATININE 0.76 0.84   EGFR 109.5 106.2   GLUCOSE 248* 351*   CALCIUM 8.6 9.8   MAGNESIUM 2.0  --          Lab 05/06/22  1358   TOTAL PROTEIN 7.8   ALBUMIN 4.00   GLOBULIN 3.8   ALT (SGPT) 29   AST (SGOT) 32   BILIRUBIN 0.4   ALK PHOS 144*         Lab 05/06/22  1358   PROBNP 1,824.0*   TROPONIN T <0.010                 Brief Urine Lab Results  (Last result in the past 365 days)      Color   Clarity   Blood   Leuk Est   Nitrite   Protein   CREAT   Urine HCG        07/26/21 1505 Yellow   Clear   Negative   Negative   Negative   30 mg/dL (1+)                 Microbiology Results Abnormal     Procedure Component Value - Date/Time    S. Pneumo Ag Urine or CSF - Urine, Urine, Clean Catch [[id number]]  (Normal) Collected: 05/06/22 1822    Lab Status: Final result Specimen: Urine, Clean Catch Updated: 05/07/22 0639     Strep Pneumo Ag Negative    Legionella Antigen, Urine - Urine, Urine, Clean Catch [[id number]]  (Normal) Collected: 05/06/22 1822    Lab Status: Final result Specimen: Urine, Clean Catch Updated: 05/07/22 0639     LEGIONELLA ANTIGEN, URINE Negative    COVID PRE-OP / PRE-PROCEDURE SCREENING ORDER (NO ISOLATION) - Swab, Nasopharynx [[id number]]  (Normal) Collected: 05/06/22 1418    Lab Status: Final result Specimen: Swab from Nasopharynx Updated: 05/06/22 1445    Narrative:      The following orders were created for panel order COVID PRE-OP / PRE-PROCEDURE SCREENING ORDER (NO ISOLATION) - Swab, Nasopharynx.  Procedure                               Abnormality         Status                     ---------                               -----------         ------                     COVID-19 and FLU A/B PCR...[[id number]]  Normal              Final result                 Please view results for these tests on the individual orders.    COVID-19 and FLU A/B PCR - Swab, Nasopharynx [[id number]]  (Normal) Collected: 05/06/22 1418    Lab Status: Final  result Specimen: Swab from Nasopharynx Updated: 05/06/22 1445     COVID19 Not Detected     Influenza A PCR Not Detected     Influenza B PCR Not Detected    Narrative:      Fact sheet for providers: https://www.fda.gov/media/453345/download    Fact sheet for patients: https://www.fda.gov/media/981309/download    Test performed by PCR.          XR Chest 1 View    Result Date: 5/6/2022  DATE OF EXAM: 5/6/2022 1:58 PM  PROCEDURE: XR CHEST 1 VW-  INDICATIONS: SOA triage protocol  COMPARISON: 8/10/2021  TECHNIQUE: Single radiographic AP view of the chest was obtained.  FINDINGS: Heart shadow is normal in size. Vasculature appears normal. Diffuse and extensive reticular interstitial disease throughout the lungs appears to be increased from the prior study, although in part this is due to lower lung volumes. There appears to be focally increased disease in the left upper lung out of proportion to changes elsewhere, at least potentially a superimposed pneumonia. No dense lung consolidation effusion or pneumothorax is seen.      Impression:  1. Extensive bilateral pulmonary interstitial disease, similar to 8/10/2021 and presumably representing fibrosis. 2. Generalized worsening appearance of the chest in part due to low lung volumes. 3. Asymmetrically denser disease of the left upper lung may represent a superimposed pneumonia.  This report was finalized on 5/6/2022 2:05 PM by Dr. Arnold Sr MD.        Results for orders placed in visit on 05/26/21    Echocardiogram Scan      I have reviewed the medications:  Scheduled Meds:cefTRIAXone, 1 g, Intravenous, Q24H   And  azithromycin, 500 mg, Intravenous, Q24H  budesonide-formoterol, 2 puff, Inhalation, BID - RT  divalproex, 750 mg, Oral, BID  FLUoxetine, 40 mg, Oral, QAM  heparin (porcine), 5,000 Units, Subcutaneous, Q8H  insulin detemir, 10 Units, Subcutaneous, Nightly  insulin lispro, 0-7 Units, Subcutaneous, 4x Daily With Meals & Nightly  ipratropium-albuterol, 3 mL,  Nebulization, Q6H While Awake - RT  lisinopril, 20 mg, Oral, Daily  metFORMIN ER, 1,000 mg, Oral, Daily With Breakfast  methylPREDNISolone sodium succinate, 60 mg, Intravenous, Q12H  nicotine, 1 patch, Transdermal, Q24H  pantoprazole, 40 mg, Oral, Q AM  Pirfenidone, 267 mg, Oral, TID  QUEtiapine, 25 mg, Oral, BID  QUEtiapine, 300 mg, Oral, Nightly  sodium chloride, 10 mL, Intravenous, Q12H      Continuous Infusions:   PRN Meds:.•  ALPRAZolam  •  dextrose  •  dextrose  •  glucagon (human recombinant)  •  ipratropium-albuterol  •  ondansetron  •  sodium chloride  •  sodium chloride    Assessment/Plan   Assessment & Plan     Active Hospital Problems    Diagnosis  POA   • Hypoxia [R09.02]  Yes      Resolved Hospital Problems   No resolved problems to display.        Brief Hospital Course to date:  Melchor Marie is a 51 y.o. male with a history of hypertension, diabetes, pulmonary fibrosis followed by pulmonology who is presenting with increased frequency of cough and dyspnea and increased hypoxemia admitted with bilateral pneumonia    Bilateral pneumonia  History of pulmonary fibrosis  Hypoxia  - He uses 8L of O2 by nasal cannula at home; currently on high flow nasal cannula with good saturations but he is becoming fatigued and his respiratory status is tenuous.  He tells me that pulmonology has discussed possible transition to ICU if he does not improve.  We discussed possible intubation and he needs to think about whether he wants this.  - Titrate O2 delivery to baseline level.  - IV antibiotic coverage with ceftriaxone and azithromycin.  - DuoNeb treatments every 6 hours scheduled, can have every 4 hours as needed.  - Urine for Legionella and Strep pneumo antigen negative  - Sputum for culture and Gram stain  - Pulmonology consult requested, input appreciated.  - Continue pirfenidone from home regimen (patient may use his own supply of medication for this).  - Continue Symbicort.    - methylprednisone  - He is a  former smoker, okay to continue transdermal nicotine.     Type 2 diabetes  - Start Levemir  - SSI coverage, AC/at bedtime  - Continue metformin.     Hypertension  - Continue lisinopril.     Anxiety/depression  - Continue fluoxetine, Depakote.    DVT prophylaxis:  Medical DVT prophylaxis orders are present.       AM-PAC 6 Clicks Score (PT): 14 (05/07/22 6251)    Disposition: I expect the patient to be discharged TBD    CODE STATUS:   Code Status and Medical Interventions:   Ordered at: 05/06/22 2606     Level Of Support Discussed With:    Patient     Code Status (Patient has no pulse and is not breathing):    CPR (Attempt to Resuscitate)     Medical Interventions (Patient has pulse or is breathing):    Full Support       Bria Reid MD  05/07/22

## 2022-05-07 NOTE — PLAN OF CARE
Goal Outcome Evaluation:  Plan of Care Reviewed With: patient        Progress: no change  Outcome Evaluation: OT evaluation completed with pt. demonstrating decreased strength, balance and endurance impacting BADL independence. Pt. on hi flow with 02 sats 88-94% with multiple rest periods with cues for PLB throughout session. Pt. could only take fast shallow breaths despite cueing.  Did best when distracted and not focusing on breating.  Pt. needed overall mod to max A with bathing and dressing tasks.  Pt. would benefit from rollator and tub bench at home vs shower chair with grab bar. Pt. appropriate for continued skilled OT services to address deficit areas and recommend IRF for pulmonary rehab at discharge.

## 2022-05-07 NOTE — THERAPY EVALUATION
Patient Name: Melchor Marie  : 1970    MRN: [id number]                              Today's Date: 2022       Admit Date: 2022    Visit Dx:     ICD-10-CM ICD-9-CM   1. Hypoxia  R09.02 799.02   2. Multifocal pneumonia  J18.9 486   3. Shortness of breath  R06.02 786.05   4. Referred by primary care physician  Z76.89 V68.89     Patient Active Problem List   Diagnosis   • PVD (peripheral vascular disease) with claudication (HCC)   • Hyperlipidemia LDL goal <70   • Essential hypertension   • Tobacco use   • Intractable abdominal pain   • Bulimia nervosa   • Duodenitis   • Starvation ketoacidosis   • Dehydration   • Hyponatremia   • Chest pain at rest   • Type 2 diabetes mellitus, without long-term current use of insulin (HCC)   • Hypoxia     Past Medical History:   Diagnosis Date   • Chest pain    • Cholelithiasis    • Hyperlipidemia    • Hypertension    • Nerve damage     tooth pick injury   • Tobacco use    • Type 2 diabetes mellitus (HCC)      Past Surgical History:   Procedure Laterality Date   • CHOLECYSTECTOMY     • ENDOSCOPY N/A 2021    Procedure: ESOPHAGOGASTRODUODENOSCOPY;  Surgeon: Servando Tyler MD;  Location: [facility] ENDOSCOPY;  Service: Gastroenterology;  Laterality: N/A;   • FOOT SURGERY Right       General Information     Row Name 22 1029          OT Time and Intention    Document Type evaluation  -KYLE     Mode of Treatment individual therapy;occupational therapy  -KYLE     Row Name 22 1029          General Information    Patient Profile Reviewed yes  -KYLE     Prior Level of Function independent:;all household mobility;gait;transfer;feeding;grooming;dressing;bathing;max assist:;home management  per pt. recently having to do a sponge bath, per pt. at times could heat meals in microwave, increasing difficulty past month  -KYLE     Existing Precautions/Restrictions fall;oxygen therapy device and L/min  Hi flow  -KYEL     Barriers to Rehab medically complex;previous functional deficit   -KYLE     Row Name 05/07/22 1029          Occupational Profile    Environmental Supports and Barriers (Occupational Profile) tub shower, standard toilet, rx wx, home 02 per pt. at 8L  -KYLE     Row Name 05/07/22 1029          Living Environment    People in Home spouse  pt. works varying shifts at [employer] per pt.  -KYLE     Row Name 05/07/22 1029          Home Main Entrance    Number of Stairs, Main Entrance none  -KYLE     Row Name 05/07/22 1029          Stairs Within Home, Primary    Stairs, Within Home, Primary apartment  -KYLE     Number of Stairs, Within Home, Primary none  -KYLE     Row Name 05/07/22 1029          Cognition    Orientation Status (Cognition) oriented x 4;verbal cues/prompts needed for orientation  when asking year pt. answering month and day of month  -KYLE     Row Name 05/07/22 1029          Safety Issues, Functional Mobility    Safety Issues Affecting Function (Mobility) safety precaution awareness;impulsivity  -KYLE     Impairments Affecting Function (Mobility) endurance/activity tolerance;balance;sensation/sensory awareness;shortness of breath  -KYLE           User Key  (r) = Recorded By, (t) = Taken By, (c) = Cosigned By    Initials Name Provider Type    Lashonda Madden, OT Occupational Therapist                 Mobility/ADL's     Row Name 05/07/22 1032          Bed Mobility    Bed Mobility supine-sit  -KYLE     Supine-Sit [place] (Bed Mobility) standby assist  -KYLE     Assistive Device (Bed Mobility) head of bed elevated  -KYLE     Comment, (Bed Mobility) Pt. once to EOB had to sit to recover with cues for PLB.  -KYLE     Row Name 05/07/22 1032          Transfers    Transfers sit-stand transfer;stand-sit transfer;bed-chair transfer  -KYLE     Bed-Chair [place] (Transfers) moderate assist (50% patient effort)  -KYLE     Sit-Stand [place] (Transfers) contact guard  -KYLE     Stand-Sit [place] (Transfers) contact guard  -KYLE     Row Name 05/07/22 1032          Bed-Chair Transfer    Comment,  (Bed-Chair Transfer) pt. did not hold to walker with mvmt to recliner, pt. closing eyes, guided into recliner  -     Row Name 05/07/22 1032          Sit-Stand Transfer    Assistive Device (Sit-Stand Transfers) walker, front-wheeled  -     Row Name 05/07/22 1032          Stand-Sit Transfer    Assistive Device (Stand-Sit Transfers) walker, front-wheeled  -     Row Name 05/07/22 1032          Functional Mobility    Functional Mobility- Comment deferred pt. on hi flow  -     Row Name 05/07/22 1032          Activities of Daily Living    BADL Assessment/Intervention lower body dressing;bathing;upper body dressing;grooming  -     Row Name 05/07/22 1032          Lower Body Dressing Assessment/Training    [place] Level (Lower Body Dressing) don;socks;dependent (less than 25% patient effort);pants/bottoms;maximum assist (25% patient effort)  -KYLE     Position (Lower Body Dressing) long sitting  -KYLE     Comment, (Lower Body Dressing) expect pt. could physically perform donning socks EOB, but limited by SOA and imparied endurance, pt.'s undergarment and shorts drinched in urine, nurse doffed while pt. stood in walker and OT cleaned pt., pt. was ablel to lift feet out of garments holding to walker, clean undergarment donned  -     Row Name 05/07/22 1032          Bathing Assessment/Intervention    Position (Bathing) supported standing;long sitting  -KYLE     Comment, (Bathing) Pt. washed arms, arm pits and legs quickly in long sitting with wash cloth in bed, OT cleaned [place] area while pt. held to walker in standing working on PLB  -     Row Name 05/07/22 1032          Upper Body Dressing Assessment/Training    [place] Level (Upper Body Dressing) don;pajama/robe;maximum assist (25% patient effort)  -KYLE     Position (Upper Body Dressing) edge of bed sitting  -KYLE     Comment, (Upper Body Dressing) limited by IV and SOA  -     Row Name 05/07/22 1032          Grooming Assessment/Training    [place] Level  (Grooming) set up  -KYLE     Position (Grooming) long sitting  -KYLE     Comment, (Grooming) pt. washed face  -KYLE           User Key  (r) = Recorded By, (t) = Taken By, (c) = Cosigned By    Initials Name Provider Type    Lashonda Madden, OT Occupational Therapist               Obj/Interventions     Row Name 05/07/22 1037          Sensory Assessment (Somatosensory)    Sensory Assessment (Somatosensory) UE sensation intact  -KYLE     Sensory Assessment per pt. some mild loss in toes from DM  -KYLE     Row Name 05/07/22 1037          Vision Assessment/Intervention    Vision Assessment Comment per pt. he needs bifocals, but does not like to wear and currently does not have  -KYLE     Row Name 05/07/22 1037          Range of Motion Comprehensive    General Range of Motion bilateral upper extremity ROM WNL  -     Row Name 05/07/22 1037          Strength Comprehensive (MMT)    General Manual Muscle Testing (MMT) Assessment upper extremity strength deficits identified  -KYLE     Comment, General Manual Muscle Testing (MMT) Assessment BUE shoulders grossly 3+/5, distally 4+/5  -     Row Name 05/07/22 1037          Motor Skills    Motor Skills coordination  -KYLE     Coordination other (see comments)  pt. reports intermittent tremors that causes him to drop items  -     Row Name 05/07/22 1037          Balance    Balance Assessment sitting static balance;sitting dynamic balance;standing static balance;standing dynamic balance  -KYLE     Static Sitting Balance independent  -KYLE     Dynamic Sitting Balance standby assist  -KYLE     Position, Sitting Balance unsupported;sitting edge of bed  -KYLE     Static Standing Balance standby assist  -KYLE     Dynamic Standing Balance contact guard  -KYLE     Position/Device Used, Standing Balance walker, rolling;supported  -KYLE     Balance Interventions sit to stand;occupation based/functional task  -KYLE           User Key  (r) = Recorded By, (t) = Taken By, (c) = Cosigned By    Initials Name Provider Type     Lashonda Madden, OT Occupational Therapist               Goals/Plan     Row Name 05/07/22 1046          Transfer Goal 1 (OT)    Activity/Assistive Device (Transfer Goal 1, OT) toilet;commode;walker, rolling  grab bar  -KYLE     [place] Level/Cues Needed (Transfer Goal 1, OT) standby assist  -KYLE     Time Frame (Transfer Goal 1, OT) long term goal (LTG);10 days  -KYLE     Progress/Outcome (Transfer Goal 1, OT) goal ongoing  -     Row Name 05/07/22 1046          Strength Goal 1 (OT)    Strength Goal 1 (OT) Pt. will complete 10 reps each UE AROM/strengthening/pulmonary TE using PLB to support ADL independence.  -KYLE     Time Frame (Strength Goal 1, OT) long term goal (LTG);10 days  -KYLE     Progress/Outcome (Strength Goal 1, OT) goal ongoing  -[facility] Name 05/07/22 1046          Problem Specific Goal 1 (OT)    Strategies/Barriers (Problem Specific Goal 1, OT) Pt. will completed a 10 minute ADL tasks using good EC techniques and good safety awareness with 02 sats 89%+ on 02 NC.  -KYLE     Progress/Outcome (Problem Specific Goal 1, OT) goal ongoing  -     Row Name 05/07/22 1046          Therapy Assessment/Plan (OT)    Planned Therapy Interventions (OT) activity tolerance training;adaptive equipment training;BADL retraining;functional balance retraining;occupation/activity based interventions;transfer/mobility retraining;patient/caregiver education/training;ROM/therapeutic exercise;strengthening exercise  -KYLE           User Key  (r) = Recorded By, (t) = Taken By, (c) = Cosigned By    Initials Name Provider Type    Lashonda Madden, OT Occupational Therapist               Clinical Impression     Row Name 05/07/22 1040          Pain Assessment    Pretreatment Pain Rating 7/10  -KYLE     Posttreatment Pain Rating 7/10  -KYLE     Pain Location - Side/Orientation Bilateral  -KYLE     Pain Location lower  -KYLE     Pain Location - back  B legs  -KYLE     Pre/Posttreatment Pain Comment per pt. he had some pain meds ealier  -KYLE      Pain Intervention(s) Ambulation/increased activity;Repositioned  -     Row Name 05/07/22 1040          Plan of Care Review    Plan of Care Reviewed With patient  -KYLE     Progress no change  -     Outcome Evaluation OT evaluation completed with pt. demonstrating decreased strength, balance and endurance impacting BADL independence. Pt. on hi flow with 02 sats 88-94% with multiple rest periods with cues for PLB throughout session. Pt. could only take fast shallow breaths despite cueing.  Did best when distracted and not focusing on breating.  Pt. needed overall mod to max A with bathing and dressing tasks.  Pt. would benefit from rollator and tub bench at home vs shower chair with grab bar. Pt. appropriate for continued skilled OT services to address deficit areas and recommend IRF for pulmonary rehab at discharge.  -     Row Name 05/07/22 1040          Therapy Assessment/Plan (OT)    Patient/Family Therapy Goal Statement (OT) return to higher level of function ADL's  -     Rehab Potential (OT) good, to achieve stated therapy goals  -     Criteria for Skilled Therapeutic Interventions Met (OT) yes;meets criteria;skilled treatment is necessary  -     Therapy Frequency (OT) daily  -     Row Name 05/07/22 1040          Therapy Plan Review/Discharge Plan (OT)    Equipment Needs Upon Discharge (OT) tub bench;shower chair  rollator (pt. issued information on [employer] for AD/AE)  -     Anticipated Discharge Disposition (OT) inpatient rehabilitation facility  -     Row Name 05/07/22 1040          Vital Signs    Pre Systolic BP Rehab 107  -KYLE     Pre Treatment Diastolic BP 75  -KYLE     Post Systolic BP Rehab 108  -KYLE     Post Treatment Diastolic BP 77  -KYLE     Pretreatment Heart Rate (beats/min) 90  -KYLE     Posttreatment Heart Rate (beats/min) 89  -KYLE     Pre SpO2 (%) 92  -KYLE     O2 Delivery Pre Treatment hi-flow  -KYLE     Intra SpO2 (%) 88  88-94  -KYLE     O2 Delivery Intra Treatment hi-flow  -KYLE      Post SpO2 (%) 91  -KYLE     O2 Delivery Post Treatment hi-flow  -KYLE     Row Name 05/07/22 1040          Positioning and Restraints    Pre-Treatment Position in bed  -KYLE     Post Treatment Position chair  -KYLE     In Chair reclined;call light within reach;encouraged to call for assist;exit alarm on;waffle cushion;notified nsg;heels elevated  -KYLE           User Key  (r) = Recorded By, (t) = Taken By, (c) = Cosigned By    Initials Name Provider Type    Lashonda Madden, SANDRA Occupational Therapist               Outcome Measures     Row Name 05/07/22 1049          How much help from another is currently needed...    Putting on and taking off regular lower body clothing? 2  all limited by poor activity tolerance/endurance/safety  -KYLE     Bathing (including washing, rinsing, and drying) 2  -KYLE     Toileting (which includes using toilet bed pan or urinal) 2  -KYLE     Putting on and taking off regular upper body clothing 2  -KYLE     Taking care of personal grooming (such as brushing teeth) 3  -KYLE     Eating meals 4  -KYLE     AM-PAC 6 Clicks Score (OT) 15  -KYLE     Row Name 05/07/22 0848          How much help from another person do you currently need...    Turning from your back to your side while in flat bed without using bedrails? 3  -JA     Moving from lying on back to sitting on the side of a flat bed without bedrails? 3  -JA     Moving to and from a bed to a chair (including a wheelchair)? 2  -JA     Standing up from a chair using your arms (e.g., wheelchair, bedside chair)? 2  -JA     Climbing 3-5 steps with a railing? 2  -JA     To walk in hospital room? 2  -JA     AM-PAC 6 Clicks Score (PT) 14  -JA     Highest level of mobility 4 --> Transferred to chair/commode  -JA     Row Name 05/07/22 1049          Functional Assessment    Outcome Measure Options AM-PAC 6 Clicks Daily Activity (OT)  -KYLE           User Key  (r) = Recorded By, (t) = Taken By, (c) = Cosigned By    Initials Name Provider Type    Lashonda Madden, OT  Occupational Therapist    Riri Sam, RN Registered Nurse                Occupational Therapy Education                 Title: PT OT SLP Therapies (In Progress)     Topic: Occupational Therapy (In Progress)     Point: ADL training (Done)     Description:   Instruct learner(s) on proper safety adaptation and remediation techniques during self care or transfers.   Instruct in proper use of assistive devices.              Learning Progress Summary           Patient Acceptance, E,D, VU,NR by  at 5/7/2022 1050    Comment: reason for consult, noted deficits, PLB, lending library information, recommended AD                   Point: Home exercise program (Not Started)     Description:   Instruct learner(s) on appropriate technique for monitoring, assisting and/or progressing therapeutic exercises/activities.              Learner Progress:  Not documented in this visit.          Point: Precautions (Done)     Description:   Instruct learner(s) on prescribed precautions during self-care and functional transfers.              Learning Progress Summary           Patient Acceptance, E,D, VU,NR by KYLE at 5/7/2022 1050    Comment: reason for consult, noted deficits, PLB, lending library information, recommended AD                   Point: Body mechanics (Done)     Description:   Instruct learner(s) on proper positioning and spine alignment during self-care, functional mobility activities and/or exercises.              Learning Progress Summary           Patient Acceptance, E,D, VU,NR by KYLE at 5/7/2022 1050    Comment: reason for consult, noted deficits, PLB, lending library information, recommended AD                               User Key     Initials Effective Dates Name Provider Type Discipline     06/16/21 -  Lashonda Reina OT Occupational Therapist OT              OT Recommendation and Plan  Planned Therapy Interventions (OT): activity tolerance training, adaptive equipment training, BADL retraining, functional  balance retraining, occupation/activity based interventions, transfer/mobility retraining, patient/caregiver education/training, ROM/therapeutic exercise, strengthening exercise  Therapy Frequency (OT): daily  Plan of Care Review  Plan of Care Reviewed With: patient  Progress: no change  Outcome Evaluation: OT evaluation completed with pt. demonstrating decreased strength, balance and endurance impacting BADL independence. Pt. on hi flow with 02 sats 88-94% with multiple rest periods with cues for PLB throughout session. Pt. could only take fast shallow breaths despite cueing.  Did best when distracted and not focusing on breating.  Pt. needed overall mod to max A with bathing and dressing tasks.  Pt. would benefit from rollator and tub bench at home vs shower chair with grab bar. Pt. appropriate for continued skilled OT services to address deficit areas and recommend IRF for pulmonary rehab at discharge.     Time Calculation:    Time Calculation- OT     Row Name 05/07/22 1051             Time Calculation- OT    OT Start Time 0947  -KYLE      OT Received On 05/07/22  -KYLE      OT Goal Re-Cert Due Date 05/17/22  -KYLE              Timed Charges    18304 - OT Therapeutic Activity Minutes 8  -KYLE      43289 - OT Self Care/Mgmt Minutes 23  -KYLE              Untimed Charges    OT Eval/Re-eval Minutes 38  -KYLE              Total Minutes    Timed Charges Total Minutes 31  -KYLE      Untimed Charges Total Minutes 38  -KYLE       Total Minutes 69  -KYLE            User Key  (r) = Recorded By, (t) = Taken By, (c) = Cosigned By    Initials Name Provider Type    Lashonda Madden OT Occupational Therapist              Therapy Charges for Today     Code Description Service Date Service Provider Modifiers Qty    86172101691 HC OT THERAPEUTIC ACT EA 15 MIN 5/7/2022 Lashonda Reina OT GO 1    72533348753 HC OT SELF CARE/MGMT/TRAIN EA 15 MIN 5/7/2022 Lashonda Reina OT GO 1    09576932275 HC OT EVAL MOD COMPLEXITY 3 5/7/2022 Lashonda Reina  OT GO 1               Lashonda Reina, OT  5/7/2022

## 2022-05-07 NOTE — CONSULTS
Inpatient Pulmonology Consult  Consult performed by: Brock Rivas MD  Consult ordered by: Ej Boyce III, DO        Pulmonary Medicine Consultation     Patient Care Team:  Sylvia Parker PA as PCP - General (Physician Assistant)  Hang Wagoner MD as Consulting Physician (Cardiology)    Reason for Consultation: Acute on chronic hypoxemic respiratory failure    Subjective   History of Present Illness:  This is an unfortunate 51-year-old gentleman with a past medical history significant for prior cigarette abuse as well as a diagnosis of probable idiopathic pulmonary fibrosis since at least 2017 currently on pirfenidone antifibrotic therapy who also is typically on 7 to 8 L of nasal cannula oxygen at home.  At baseline he is very short of breath and limited in his activities.  By records, he previously had been under evaluation at  for lung transplant and while details of that are not completely forthcoming, apparently there was a concern about possible compliance and some psychiatric concerns.  He follows with Dr. Padilla, pulmonology, and [place].      He apparently presented to the emergency department yesterday in [place], [place], with complaints of approximately 2 days of worsened shortness of breath.  He denied any fevers but states he did have perhaps some chills the other day.  He has not been bringing up much in the way of secretions however when he does bring it up it is yellow in coloration.  He states he typically does not have sputum expectoration with a cough.  He denied hemoptysis.  He has denied rashes.  His saturations have gotten as low as the 50s per his report and he has required higher levels of oxygen support since his arrival here.  At that emergency department he underwent a CT scan of the chest which I believe was with contrast.  Apparently there were no pulmonary emboli.  The report suggested diffuse bilateral groundglass disease.  Swabs for flu  and COVID were negative.  Unfortunately I do not have these films to review myself.  Chest x-ray at our facility does show bilateral interstitial changes consistent with his known fibrosis which appear to be grossly unchanged compared to a 2021 film.    Review of Systems:  Pertinent items are noted in HPI, all other systems reviewed and negative    Past Medical History:  Past Medical History:   Diagnosis Date   • Chest pain    • Cholelithiasis    • Hyperlipidemia    • Hypertension    • Nerve damage     tooth pick injury   • Tobacco use    • Type 2 diabetes mellitus (HCC)      Past Surgical History:   Procedure Laterality Date   • CHOLECYSTECTOMY     • ENDOSCOPY N/A 7/27/2021    Procedure: ESOPHAGOGASTRODUODENOSCOPY;  Surgeon: Servando Tyler MD;  Location: [facility] ENDOSCOPY;  Service: Gastroenterology;  Laterality: N/A;   • FOOT SURGERY Right        Allergies:  No Known Allergies    Medications:  No current facility-administered medications on file prior to encounter.     Current Outpatient Medications on File Prior to Encounter   Medication Sig Dispense Refill   • albuterol sulfate  (90 Base) MCG/ACT inhaler Inhale 2 puffs Every 6 (Six) Hours As Needed for Shortness of Air.     • ALPRAZolam (XANAX) 1 MG tablet Take 1 mg by mouth 2 (Two) Times a Day As Needed for Anxiety.     • aspirin (aspirin) 81 MG EC tablet Take 1 tablet by mouth Daily. 90 tablet 3   • busPIRone (BUSPAR) 30 MG tablet Take 30 mg by mouth 2 (two) times a day.     • dapagliflozin (Farxiga) 5 MG tablet tablet Take 5 mg by mouth Daily.     • divalproex (DEPAKOTE) 500 MG DR tablet Take 750 mg by mouth 2 (Two) Times a Day.     • FLUoxetine (PROzac) 40 MG capsule Take 40 mg by mouth Every Morning.     • hydrOXYzine (ATARAX) 10 MG tablet Take 10 mg by mouth Every 8 (Eight) Hours As Needed for Itching, Allergies or Anxiety.     • Insulin Glargine (BASAGLAR KWIKPEN) 100 UNIT/ML injection pen Inject 30 Units under the skin into the appropriate area  as directed 2 (Two) Times a Day.     • lisinopril (PRINIVIL,ZESTRIL) 20 MG tablet Take 20 mg by mouth Daily.     • metFORMIN ER (GLUCOPHAGE-XR) 500 MG 24 hr tablet TAKE 2 TABLETS BY MOUTH EVERY EVENING WITH A MEAL     • nicotine (NICODERM CQ) 21 MG/24HR patch Place 1 patch on the skin as directed by provider Daily.     • pantoprazole (PROTONIX) 40 MG EC tablet Take 1 tablet by mouth 2 (two) times a day. 60 tablet 5   • PIRFENIDONE PO Take 267 mg by mouth 3 (Three) Times a Day.     • QUEtiapine (SEROquel) 25 MG tablet Take 25 mg by mouth 2 (Two) Times a Day. 8 am and 1 pm     • QUEtiapine (SEROquel) 300 MG tablet Take 300 mg by mouth Every Night.     • Rivaroxaban (Xarelto) 2.5 MG tablet Take 1 tablet by mouth 2 (Two) Times a Day. 60 tablet 6   • rosuvastatin (CRESTOR) 10 MG tablet Take 10 mg by mouth Every Night.     • Sod Picosulfate-Mag Ox-Cit Acd 10-3.5-12 MG-GM -GM/160ML solution Take 1 kit by mouth Take As Directed. Follow instructions mailed to your home. If you did not receive instructions please call; [phone]. 320 mL 0   • Spiriva HandiHaler 18 MCG per inhalation capsule Place 1 capsule into inhaler and inhale Daily.     • sucralfate (CARAFATE) 1 g tablet Take 1 tablet by mouth 4 (Four) Times a Day Before Meals & at Bedtime. 120 tablet 1   • Symbicort 160-4.5 MCG/ACT inhaler Inhale 2 puffs 2 (Two) Times a Day.          cefTRIAXone, 1 g, Intravenous, Q24H   And  azithromycin, 500 mg, Intravenous, Q24H  budesonide-formoterol, 2 puff, Inhalation, BID - RT  divalproex, 750 mg, Oral, BID  FLUoxetine, 40 mg, Oral, QAM  heparin (porcine), 5,000 Units, Subcutaneous, Q8H  insulin detemir, 10 Units, Subcutaneous, Nightly  insulin lispro, 0-7 Units, Subcutaneous, 4x Daily With Meals & Nightly  ipratropium-albuterol, 3 mL, Nebulization, Q6H While Awake - RT  lisinopril, 20 mg, Oral, Daily  metFORMIN ER, 1,000 mg, Oral, Daily With Breakfast  methylPREDNISolone sodium succinate, 500 mg, Intravenous, Q12H  nicotine,  1 patch, Transdermal, Q24H  pantoprazole, 40 mg, Oral, Q AM  Pirfenidone, 267 mg, Oral, TID  QUEtiapine, 25 mg, Oral, BID  QUEtiapine, 300 mg, Oral, Nightly  sodium chloride, 10 mL, Intravenous, Q12H        Social History:  Social History     Socioeconomic History   • Marital status:    Tobacco Use   • Smoking status: Heavy Tobacco Smoker     Packs/day: 0.50     Types: Cigarettes   • Smokeless tobacco: Never Used   Vaping Use   • Vaping Use: Never used   Substance and Sexual Activity   • Alcohol use: Not Currently   • Drug use: Never   • Sexual activity: Defer       Family History:  Family History   Problem Relation Age of Onset   • Coronary artery disease Mother 52        CABG*47   • Diabetes Father    • Diabetes Other      Family history is reviewed and is not contributory to the case.    Objective   Objective     Physical Exam:  Vitals:    05/07/22 1332   BP:    Pulse: 87   Resp:    Temp:    SpO2: 95%     Physical Exam  Vitals reviewed.   Constitutional:       Appearance: He is normal weight. He is not diaphoretic.      Comments: Mildly distressed gentleman currently wearing high flow nasal cannula.   HENT:      Head: Normocephalic.      Nose: Nose normal. No congestion.      Mouth/Throat:      Mouth: Mucous membranes are moist.      Pharynx: No oropharyngeal exudate.   Eyes:      General: No scleral icterus.     Extraocular Movements: Extraocular movements intact.      Conjunctiva/sclera: Conjunctivae normal.      Pupils: Pupils are equal, round, and reactive to light.   Cardiovascular:      Rate and Rhythm: Normal rate and regular rhythm.      Pulses: Normal pulses.      Heart sounds: Normal heart sounds. No murmur heard.  Pulmonary:      Effort: Pulmonary effort is normal.      Comments: Fine as well as occasionally coarse crackles in the bilateral bases up to the mid lungs.  No wheezes are heard.  There is no stridor over the upper airways.  Abdominal:      General: Abdomen is flat. Bowel sounds are  normal. There is no distension.   Musculoskeletal:         General: Normal range of motion.      Cervical back: Normal range of motion.      Right lower leg: No edema.      Left lower leg: No edema.      Comments: Severe clubbing to the upper and lower digits.  No cyanosis is noted.   Skin:     General: Skin is warm.      Capillary Refill: Capillary refill takes less than 2 seconds.      Findings: No erythema, lesion or rash.   Neurological:      General: No focal deficit present.      Mental Status: He is alert and oriented to person, place, and time.   Psychiatric:         Mood and Affect: Mood normal.         Lab Results/Imaging/Diagnostics:  Results from last 7 days   Lab Units 05/07/22  0725 05/06/22  1358   WBC 10*3/mm3 7.04 11.97*   HEMOGLOBIN g/dL 14.7 17.3   PLATELETS 10*3/mm3 220 310     Results from last 7 days   Lab Units 05/07/22  0725 05/06/22  1358   SODIUM mmol/L 138 131*   POTASSIUM mmol/L 4.3 4.5   CO2 mmol/L 29.0 26.0   BUN mg/dL 15 15   CREATININE mg/dL 0.76 0.84   MAGNESIUM mg/dL 2.0  --    GLUCOSE mg/dL 248* 351*     Estimated Creatinine Clearance: 120.2 mL/min (by C-G formula based on SCr of 0.76 mg/dL).              Images: Available imaging reviewed as per the HPI.    Assessment/Plan   Impression & Plan     Impression:    Acute on chronic respiratory failure with hypoxia (HCC)    Pulmonary fibrosis, presumed IPF currently on antifibrotic and 8 L nasal cannula oxygen at home    PVD (peripheral vascular disease) with claudication (HCC)    Essential hypertension    Type 2 diabetes mellitus, without long-term current use of insulin (HCC)    Abnormal chest x-ray, rule out pneumonia versus IPF exacerbation versus      Plan:    Mr. Marie has severe underlying pulmonary fibrosis currently on pirfenidone and requiring high levels of oxygen support even at baseline and is currently most likely an exacerbation.  I think that the possibilities would include actual exacerbation of pulmonary fibrosis with  diffuse inflammation and/or community-acquired pneumonia.  I have not been able to review his CT scan of the chest from [place] however the description of groundglass opacities would support either 1 of these.  He does not necessarily have a prodrome of infection currently and I would favor an exacerbation of his underlying fibrosis.    Unfortunately, this is a very dangerous spot for him to be in and his likelihood of further deterioration is very high.  He is however currently holding his own with application of high flow nasal cannula oxygen.    I did have a long discussion with him and he does state that he would want to be placed on the ventilator if absolutely necessary but would not want to be kept alive on the ventilator for prolonged period of time if he is not able to be weaned.  He also states that he would want to be resuscitated in the event of cardiac arrest.    At this point, I think that he would benefit from high-dose pulsed steroids amounting to approximately 1 g daily for the next 3 days in split doses.  I will go ahead and order this.  This likely will cause his blood glucose to skyrocket we will need to make adjustments to his diabetes control as necessary.  Continuing with current antimicrobials and we will follow cultures.  Wean oxygen to maintain saturations above 88%.  Clinically he does appear to be euvolemic at this time and I would not diurese him as yet.  This may be of benefit in the next day or so however.    I will continue to follow very closely.    I discussed these findings and my recommendations with patient       Brock Rivas MD, [facility]  Pulmonary and Critical Care Medicine  05/07/22 15:25 EDT

## 2022-05-07 NOTE — PROGRESS NOTES
"                    Clinical Nutrition       Patient Name: Melchor Marie  YOB: 1970  MRN: [id number]  Date of Encounter: 05/07/22 15:40 EDT  Admission date: 5/6/2022      Reason for Visit   decreased intake over one month       EMR  Reviewed   Yes    Height: Height: 170.2 cm (67\")  Weight: Weight: 83.6 kg (184 lb 6.4 oz) (05/06/22 1700)  BMI: BMI (Calculated): 28.9     Reported/Observed/Food/Nutrition Related - Comments     Patient reports good appetite.  Decreased intake prior to admission as it was difficult for him to prepare his meals.  Eating well now.  No recent weight changes to report.      Current Nutrition Prescription     Diet Regular; Cardiac, Consistent Carbohydrate  Orders Placed This Encounter      Dietary Nutrition Supplements Boost Glucose Control; strawberry    Evaluation of Received Nutrient/Fluid Intake:  Insufficient data       Nutrition Diagnosis   Date: 5/7 Updated:   Problem No nutrition diagnosis at this time   Etiology    Signs/Symptoms    Status:     Actions     Follow treatment progress, Care plan reviewed, Interview for preferences, Menu provided   1. Boost G.C. daily with dinner meal per patient request.     Follow up per protocol.       Cony Davis RD,   Time Spent: 15min          "

## 2022-05-07 NOTE — NURSING NOTE
"Patient sustaining O2 sat of 88-94% on hi-flow nasal cannula. Instructed patient on I.S. use and breathing techniques with good return demonstration. Patient needs reminding on breathing techniques. Patient requesting cough medication and stating that coughing was causing light headedness, ordered and given. RT called to place patient back on BIPAP since he stated feeling more comfortable on BIPAP at approx 1800 and stated they would come since pt has neb tx due at 1900 but active resp distress noted.Patient then began having anxiety and hyperventilating at approx 1820. Tech (RN) in room while this RN went to grab PRN Xanax. Patient was verbal while in room scanning medication when patient \"passed out\". Patient was sternal rubbed with no response but breathing with O2 sat of 89-90%% and pulse. Patient was unresponsive for about 10 seconds when suddenly woke up and oriented to person, place, time and situation. Xanax given per pt request. MD notified and orders received. 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 No other orders received.   "

## 2022-05-08 NOTE — CONSULTS
Neurology Note    Patient:  Melchor Marie    YOB: 1970    REFERRING PHYSICIAN:  Dr. Miranda    CHIEF COMPLAINT:    Passing out    HISTORY OF PRESENT ILLNESS:   The patient is a 51 y.o. male with h/o pseudoseizures, anxiety, on Depakote and prn Xanax, admitted on 5/6 with bilateral PNA. He has been coughing some, reports a throbbing headache, dizziness and brief LOC with repeated coughing. Reports diffuse aches and numbness. COVID negative.    Past Medical History:  Past Medical History:   Diagnosis Date   • Chest pain    • Cholelithiasis    • Hyperlipidemia    • Hypertension    • Nerve damage     tooth pick injury   • Tobacco use    • Type 2 diabetes mellitus (HCC)        Past Surgical History:  Past Surgical History:   Procedure Laterality Date   • CHOLECYSTECTOMY     • ENDOSCOPY N/A 7/27/2021    Procedure: ESOPHAGOGASTRODUODENOSCOPY;  Surgeon: Servando Tyler MD;  Location: [facility] ENDOSCOPY;  Service: Gastroenterology;  Laterality: N/A;   • FOOT SURGERY Right        Social History:   Social History     Socioeconomic History   • Marital status:    Tobacco Use   • Smoking status: Heavy Tobacco Smoker     Packs/day: 0.50     Types: Cigarettes   • Smokeless tobacco: Never Used   Vaping Use   • Vaping Use: Never used   Substance and Sexual Activity   • Alcohol use: Not Currently   • Drug use: Never   • Sexual activity: Defer        Family History:   Family History   Problem Relation Age of Onset   • Coronary artery disease Mother 52        CABG*47   • Diabetes Father    • Diabetes Other        Medications Prior to Admission:    Prior to Admission medications    Medication Sig Start Date End Date Taking? Authorizing Provider   albuterol sulfate  (90 Base) MCG/ACT inhaler Inhale 2 puffs Every 6 (Six) Hours As Needed for Shortness of Air.   Yes Nabil Schmidt MD   ALPRAZolam (XANAX) 1 MG tablet Take 1 mg by mouth 2 (Two) Times a Day As Needed for Anxiety.   Yes Nabil Schmidt MD    aspirin (aspirin) 81 MG EC tablet Take 1 tablet by mouth Daily. 5/27/21  Yes Hang Wagoner MD   busPIRone (BUSPAR) 30 MG tablet Take 30 mg by mouth 2 (two) times a day. 5/6/21  Yes Nabil Schmidt MD   dapagliflozin (Farxiga) 5 MG tablet tablet Take 5 mg by mouth Daily.   Yes Nabil Schmidt MD   divalproex (DEPAKOTE) 500 MG DR tablet Take 750 mg by mouth 2 (Two) Times a Day. 5/10/21  Yes Nabil Schmidt MD   FLUoxetine (PROzac) 40 MG capsule Take 40 mg by mouth Every Morning. 8/11/21  Yes Nabil Schmidt MD   hydrOXYzine (ATARAX) 10 MG tablet Take 10 mg by mouth Every 8 (Eight) Hours As Needed for Itching, Allergies or Anxiety. 5/10/21  Yes Nabil Schmidt MD   Insulin Glargine (BASAGLAR KWIKPEN) 100 UNIT/ML injection pen Inject 30 Units under the skin into the appropriate area as directed 2 (Two) Times a Day. 8/11/21  Yes Nabil Schmidt MD   lisinopril (PRINIVIL,ZESTRIL) 20 MG tablet Take 20 mg by mouth Daily. 5/10/21  Yes Nabil Schmidt MD   metFORMIN ER (GLUCOPHAGE-XR) 500 MG 24 hr tablet TAKE 2 TABLETS BY MOUTH EVERY EVENING WITH A MEAL 9/2/21  Yes Nabil Schmidt MD   nicotine (NICODERM CQ) 21 MG/24HR patch Place 1 patch on the skin as directed by provider Daily.   Yes Nabil Schmidt MD   pantoprazole (PROTONIX) 40 MG EC tablet Take 1 tablet by mouth 2 (two) times a day. 9/8/21  Yes Jory Patrick APRN   PIRFENIDONE PO Take 267 mg by mouth 3 (Three) Times a Day.   Yes Nabil Schmidt MD   QUEtiapine (SEROquel) 25 MG tablet Take 25 mg by mouth 2 (Two) Times a Day. 8 am and 1 pm   Yes Nabil Schmidt MD   QUEtiapine (SEROquel) 300 MG tablet Take 300 mg by mouth Every Night. 5/10/21  Yes Nabil Schmidt MD   Rivaroxaban (Xarelto) 2.5 MG tablet Take 1 tablet by mouth 2 (Two) Times a Day. 5/27/21  Yes Hang Wagoner MD   rosuvastatin (CRESTOR) 10 MG tablet Take 10 mg by mouth Every Night.   Yes Provider, MD Nabil    Sod Picosulfate-Mag Ox-Cit Acd 10-3.5-12 MG-GM -GM/160ML solution Take 1 kit by mouth Take As Directed. Follow instructions mailed to your home. If you did not receive instructions please call; [phone]. 9/9/21  Yes Jory Patrick APRN   Spiriva HandiHaler 18 MCG per inhalation capsule Place 1 capsule into inhaler and inhale Daily. 4/15/21  Yes ProviderNabil MD   sucralfate (CARAFATE) 1 g tablet Take 1 tablet by mouth 4 (Four) Times a Day Before Meals & at Bedtime. 9/8/21  Yes Jory Patrick APRN   Symbicort 160-4.5 MCG/ACT inhaler Inhale 2 puffs 2 (Two) Times a Day. 4/15/21  Yes Provider, MD Nabil       Allergies:  Patient has no known allergies.      Review of system  Review of Systems   Musculoskeletal: Positive for myalgias.   Neurological: Positive for dizziness, syncope, numbness and headaches.   Psychiatric/Behavioral: The patient is nervous/anxious.    All other systems reviewed and are negative.      Vitals:    05/08/22 1312   BP:    Pulse: 84   Resp: 24   Temp:    SpO2: 93%       Physical exam  Physical Exam  Constitutional:       Appearance: He is well-developed.   Eyes:      Extraocular Movements: Extraocular movements intact.      Pupils: Pupils are equal, round, and reactive to light.   Cardiovascular:      Rate and Rhythm: Normal rate and regular rhythm.   Pulmonary:      Effort: Pulmonary effort is normal.   Neurological:      Mental Status: He is alert and oriented to person, place, and time.      Deep Tendon Reflexes: Reflexes are normal and symmetric.      Comments: Speech clear, VFF, no facial droop, no limb drift.   Psychiatric:         Mood and Affect: Mood is anxious.         Behavior: Behavior normal.         Thought Content: Thought content normal.           Lab Results   Component Value Date    WBC 6.39 05/08/2022    HGB 15.2 05/08/2022    HCT 42.5 05/08/2022    MCV 86.9 05/08/2022     05/08/2022     Lab Results   Component Value Date    GLUCOSE 426 (C)  05/08/2022    BUN 14 05/08/2022    CREATININE 0.56 (L) 05/08/2022    EGFRIFNONA 117 08/10/2021    BCR 25.0 05/08/2022    CO2 28.0 05/08/2022    CALCIUM 9.0 05/08/2022    ALBUMIN 4.00 05/06/2022    AST 32 05/06/2022    ALT 29 05/06/2022         Radiological Studies:  XR Chest 1 View    Result Date: 5/8/2022  DATE OF EXAM: 5/8/2022 7:46 AM  PROCEDURE: XR CHEST 1 VW-  INDICATIONS: follow up pulm fibrosis, infiltrates, assess volume status; R09.02-Hypoxemia; J18.9-Pneumonia, unspecified organism; R06.02-Shortness of breath; Z76.89-Persons encountering health services in other specified circumstances  COMPARISON: 05/06/2022  TECHNIQUE: Single radiographic AP view of the chest was obtained.  FINDINGS: Heart size stable. Stable appearance of diffuse interstitial markings throughout both lungs. No focal airspace consolidation. Costophrenic angles sharp      Stable appearance of diffuse interstitial lung disease, presumably fibrosis. No acute infiltrate demonstrated currently  This report was finalized on 5/8/2022 8:13 AM by Shola Lopez.      XR Chest 1 View    Result Date: 5/6/2022  DATE OF EXAM: 5/6/2022 1:58 PM  PROCEDURE: XR CHEST 1 VW-  INDICATIONS: SOA triage protocol  COMPARISON: 8/10/2021  TECHNIQUE: Single radiographic AP view of the chest was obtained.  FINDINGS: Heart shadow is normal in size. Vasculature appears normal. Diffuse and extensive reticular interstitial disease throughout the lungs appears to be increased from the prior study, although in part this is due to lower lung volumes. There appears to be focally increased disease in the left upper lung out of proportion to changes elsewhere, at least potentially a superimposed pneumonia. No dense lung consolidation effusion or pneumothorax is seen.       1. Extensive bilateral pulmonary interstitial disease, similar to 8/10/2021 and presumably representing fibrosis. 2. Generalized worsening appearance of the chest in part due to low lung volumes. 3.  Asymmetrically denser disease of the left upper lung may represent a superimposed pneumonia.  This report was finalized on 5/6/2022 2:05 PM by Dr. Arnold Sr MD.          During this visit the following were done:  Labs Reviewed [x]    Labs Ordered []    Radiology Reports Reviewed [x]    Radiology Ordered [x]    EKG, echo, and/or stress test reviewed []    EEG results reviewed  []    EEG reviewed and interpreted per myself   []    Discussed case with neurointerventionalist or neuroradiologist []    Referring Provider Records Reviewed []    ER Records Reviewed []    Hospital Records Reviewed []    History Obtained From Family []    Radiological images view and Interpreted per myself []    Case Discussed with referring provider []     Decision to obtain and request outside records  []        Assessment and Plan     Tussive headaches and syncope. Anxiety. H/O pseudoseizures. PNA.   - MRI brain.   - EEG.   - Consider cough suppression.    Thanks,          Electronically signed by Enrike Diamond MD on 5/8/2022 at 15:12 EDT

## 2022-05-08 NOTE — PROGRESS NOTES
[facility] Medicine Services  PROGRESS NOTE    Patient Name: Melchor Marie  : 1970  MRN: [id number]    Date of Admission: 2022  Primary Care Physician: Sylvia Parker PA    Subjective   Subjective     CC:  Hypoxia, dyspnea    HPI:  Briefly unresponsive this morning lasting about a minute, tele and sats were normal, no convulsions. Back to baseline immediately after. Rapid response called. No chest pain or increased dyspnea. Feels baseline currently    ROS:  Gen- No fevers, chills  CV-intermittent chest soreness  Resp-dyspnea as above  GI- No N/V/D, abd pain        Objective   Objective     Vital Signs:   Temp:  [94.6 °F (34.8 °C)-98.3 °F (36.8 °C)] 97.8 °F (36.6 °C)  Heart Rate:  [67-96] 76  Resp:  [22-35] 22  BP: ()/(60-79) 110/74  Flow (L/min):  [55-60] 55     Physical Exam:  Constitutional: Appears to be fatigued and in slight distress, awake, alert, oriented to year, month, place, no distress  HENT: NCAT, mucous membranes moist  Respiratory: On high flow nasal cannula, poor respiratory effort, lungs clear currently  Cardiovascular: RRR, no murmurs, rubs, or gallops  Gastrointestinal: Positive bowel sounds, soft, nontender, nondistended  Musculoskeletal: No bilateral ankle edema  Psychiatric: Appropriate affect, cooperative, calm  Neurologic: Oriented x 3, strength symmetric in all extremities, Cranial Nerves grossly intact to confrontation, speech clear  Skin: No rashes      Results Reviewed:  LAB RESULTS:      Lab 22  0611 22  2244 22  1900 22  0725 22  1742 22  1358   WBC 6.39  --   --  7.04  --  11.97*   HEMOGLOBIN 15.2  --   --  14.7  --  17.3   HEMATOCRIT 42.5  --   --  44.5  --  50.7   PLATELETS 222  --   --  220  --  310   NEUTROS ABS 5.71  --   --  5.05  --  9.28*   IMMATURE GRANS (ABS) 0.02  --   --  0.02  --  0.04   LYMPHS ABS 0.60*  --   --  1.42  --  1.68   MONOS ABS 0.05*  --   --  0.52  --  0.93*   EOS ABS  0.00  --   --  0.00  --  0.00   MCV 86.9  --   --  94.7  --  90.5   PROCALCITONIN  --   --   --   --   --  0.09   LACTATE 1.4 2.7* 2.7*  --  1.6 2.8*         Lab 05/08/22  0611 05/07/22  0725 05/06/22  1358   SODIUM  --  138 131*   POTASSIUM  --  4.3 4.5   CHLORIDE  --  98 90*   CO2  --  29.0 26.0   ANION GAP  --  11.0 15.0   BUN  --  15 15   CREATININE  --  0.76 0.84   EGFR  --  109.5 106.2   GLUCOSE  --  248* 351*   CALCIUM  --  8.6 9.8   MAGNESIUM 2.0 2.0  --          Lab 05/06/22  1358   TOTAL PROTEIN 7.8   ALBUMIN 4.00   GLOBULIN 3.8   ALT (SGPT) 29   AST (SGOT) 32   BILIRUBIN 0.4   ALK PHOS 144*         Lab 05/06/22  1358   PROBNP 1,824.0*   TROPONIN T <0.010                 Lab 05/07/22  1900   PH, ARTERIAL 7.431   PCO2, ARTERIAL 45.8*   PO2 .0*   FIO2 70   HCO3 ART 30.4*   BASE EXCESS ART 5.2*   CARBOXYHEMOGLOBIN 1.4     Brief Urine Lab Results  (Last result in the past 365 days)      Color   Clarity   Blood   Leuk Est   Nitrite   Protein   CREAT   Urine HCG        07/26/21 1505 Yellow   Clear   Negative   Negative   Negative   30 mg/dL (1+)                 Microbiology Results Abnormal     Procedure Component Value - Date/Time    Blood Culture - Blood, Arm, Left [[id number]]  (Normal) Collected: 05/06/22 1500    Lab Status: Preliminary result Specimen: Blood from Arm, Left Updated: 05/07/22 1548     Blood Culture No growth at 24 hours    Blood Culture - Blood, Arm, Right [[id number]]  (Normal) Collected: 05/06/22 1520    Lab Status: Preliminary result Specimen: Blood from Arm, Right Updated: 05/07/22 1548     Blood Culture No growth at 24 hours    S. Pneumo Ag Urine or CSF - Urine, Urine, Clean Catch [[id number]]  (Normal) Collected: 05/06/22 1822    Lab Status: Final result Specimen: Urine, Clean Catch Updated: 05/07/22 0639     Strep Pneumo Ag Negative    Legionella Antigen, Urine - Urine, Urine, Clean Catch [[id number]]  (Normal) Collected: 05/06/22 1822    Lab Status: Final result Specimen: Urine,  Clean Catch Updated: 05/07/22 0639     LEGIONELLA ANTIGEN, URINE Negative    COVID PRE-OP / PRE-PROCEDURE SCREENING ORDER (NO ISOLATION) - Swab, Nasopharynx [[id number]]  (Normal) Collected: 05/06/22 1418    Lab Status: Final result Specimen: Swab from Nasopharynx Updated: 05/06/22 1445    Narrative:      The following orders were created for panel order COVID PRE-OP / PRE-PROCEDURE SCREENING ORDER (NO ISOLATION) - Swab, Nasopharynx.  Procedure                               Abnormality         Status                     ---------                               -----------         ------                     COVID-19 and FLU A/B PCR...[[id number]]  Normal              Final result                 Please view results for these tests on the individual orders.    COVID-19 and FLU A/B PCR - Swab, Nasopharynx [[id number]]  (Normal) Collected: 05/06/22 1418    Lab Status: Final result Specimen: Swab from Nasopharynx Updated: 05/06/22 1445     COVID19 Not Detected     Influenza A PCR Not Detected     Influenza B PCR Not Detected    Narrative:      Fact sheet for providers: https://www.fda.gov/media/023336/download    Fact sheet for patients: https://www.fda.gov/media/970438/download    Test performed by PCR.          XR Chest 1 View    Result Date: 5/6/2022  DATE OF EXAM: 5/6/2022 1:58 PM  PROCEDURE: XR CHEST 1 VW-  INDICATIONS: SOA triage protocol  COMPARISON: 8/10/2021  TECHNIQUE: Single radiographic AP view of the chest was obtained.  FINDINGS: Heart shadow is normal in size. Vasculature appears normal. Diffuse and extensive reticular interstitial disease throughout the lungs appears to be increased from the prior study, although in part this is due to lower lung volumes. There appears to be focally increased disease in the left upper lung out of proportion to changes elsewhere, at least potentially a superimposed pneumonia. No dense lung consolidation effusion or pneumothorax is seen.      Impression:  1. Extensive bilateral  pulmonary interstitial disease, similar to 8/10/2021 and presumably representing fibrosis. 2. Generalized worsening appearance of the chest in part due to low lung volumes. 3. Asymmetrically denser disease of the left upper lung may represent a superimposed pneumonia.  This report was finalized on 5/6/2022 2:05 PM by Dr. Arnold Sr MD.        Results for orders placed in visit on 05/26/21    Echocardiogram Scan      I have reviewed the medications:  Scheduled Meds:cefTRIAXone, 1 g, Intravenous, Q24H   And  azithromycin, 500 mg, Intravenous, Q24H  budesonide-formoterol, 2 puff, Inhalation, BID - RT  divalproex, 750 mg, Oral, BID  FLUoxetine, 40 mg, Oral, QAM  heparin (porcine), 5,000 Units, Subcutaneous, Q8H  insulin detemir, 50 Units, Subcutaneous, Daily  insulin lispro, 0-14 Units, Subcutaneous, Q3H  Insulin Lispro, 5 Units, Subcutaneous, TID With Meals  ipratropium-albuterol, 3 mL, Nebulization, Q6H While Awake - RT  lisinopril, 20 mg, Oral, Daily  metFORMIN ER, 1,000 mg, Oral, Daily With Breakfast  methylPREDNISolone sodium succinate, 500 mg, Intravenous, Q12H  nicotine, 1 patch, Transdermal, Q24H  pantoprazole, 40 mg, Oral, Q AM  Pirfenidone, 267 mg, Oral, TID  QUEtiapine, 25 mg, Oral, BID  QUEtiapine, 300 mg, Oral, Nightly  sodium chloride, 10 mL, Intravenous, Q12H      Continuous Infusions:   PRN Meds:.•  ALPRAZolam  •  benzonatate  •  dextrose  •  dextrose  •  dextrose  •  dextrose  •  glucagon (human recombinant)  •  glucagon (human recombinant)  •  ipratropium-albuterol  •  magnesium sulfate **OR** magnesium sulfate in D5W 1g/100mL (PREMIX) **OR** magnesium sulfate  •  ondansetron  •  sodium chloride  •  sodium chloride    Assessment/Plan   Assessment & Plan     Active Hospital Problems    Diagnosis  POA   • **Acute on chronic respiratory failure with hypoxia (HCC) [J96.21]  Unknown   • Pulmonary fibrosis, presumed IPF currently on antifibrotic and 8 L nasal cannula oxygen at home [J84.10]  Unknown   •  "Abnormal chest x-ray, rule out pneumonia versus IPF exacerbation versus [R93.89]  Unknown   • Type 2 diabetes mellitus, without long-term current use of insulin (HCC) [E11.9]  Yes   • PVD (peripheral vascular disease) with claudication (HCC) [I73.9]  Yes   • Essential hypertension [I10]  Yes      Resolved Hospital Problems   No resolved problems to display.        Brief Hospital Course to date:  Melchor Marie is a 51 y.o. male with a history of hypertension, diabetes, pulmonary fibrosis followed by pulmonology who is presenting with increased frequency of cough and dyspnea and increased hypoxemia admitted with bilateral pneumonia    This patient's problems and plans were partially entered by my partner and updated as appropriate by me 05/08/22. Today is my first day evaluating this patient's active medical problems. I Personally reviewed chart and adjusted note to reflect daily changes in management/clinical condition      Bilateral pulmonary infiltrates (on cxr)  Acute on chronic (8L nc at home) hypoxic resp failure  History of pulmonary fibrosis  - He uses 8L of O2 by nasal cannula at home; currently on high flow nasal cannula with good saturations but he is becoming fatigued and his respiratory status is tenuous.  He tells me that pulmonology has discussed possible transition to ICU if he does not improve.  We discussed possible intubation and he needs to think about whether he wants this.  -pulm following (Dr. Rivas consulted 4/7/22): feels pulm fibrosis \"exacerbation\" but cannot rule out some component of superinfection: initiated rocephin & z-max (day #2), high dose pulse solumedrol 500mg iv bid (day #2), continue hi flow (currently requiring 80% fio2) and wean as tolerates to keep sats >88%: continue symbicort bid, continue scheduled & prn duonebs. Currently full code/full support  -follow cultures/ micro (negatuve thus far)  -f/u cxr, probnp    Spells of altered cognition  Hx pseudoseizures  Chronic " "depakote use  Anxiety/depression  -hx of \"pseudoseizures\" per patient; is on depakote  -had episode of brief unreponsiveness evening 5/7/2 and neurology was consulted for 5/8/22; again had brief episode unresponsive spell lasting about a minute for which rapid response called 5/8/22 morning (sats were normal, normal sinus rhythm on monitor, no convulsions),currently back to baseline mental status. Suspect anxiety/pseudoseizures. Neuro already consulted, follow up vpa levels  -med rec says on xanax 1mg bid, but PDMP doesn't have it written, start xanax 0.5mg q8h prn anxiety (hold for sedation or resp distress);  -continue depakote and fluoexetine (checking vpa levels )    Type 2 diabetes, w/ marked hyperglycemic worsened by pulse steroids  -pulse solumedrol (500mg bid) started 4/7/22, now w/ fsbs >500's on 5/8/22; check a1c  -increase levemir to 50 units sq daily 1st dose now (on 30 units sq bid at home), humalog 5 units tid meals, q3h w/ sliding scale medium dose, titrate prn     Hypertension  - Continue lisinopril.     Am labs: cbc,bmp,mag, ekg (qtc)    Called wife (yvrose) [phone] to update on 5/8/22, she appreciated the call    DVT prophylaxis:  Medical DVT prophylaxis orders are present.       AM-PAC 6 Clicks Score (PT): 14 (05/07/22 0848)    Disposition: I expect the patient to be discharged TBD    CODE STATUS:   Code Status and Medical Interventions:   Ordered at: 05/06/22 1535     Level Of Support Discussed With:    Patient     Code Status (Patient has no pulse and is not breathing):    CPR (Attempt to Resuscitate)     Medical Interventions (Patient has pulse or is breathing):    Full Support       Nathan Miranda MD  05/08/22              "

## 2022-05-08 NOTE — PROGRESS NOTES
Pulmonary Medicine Follow-up     Hospital:  LOS: 2 days   Mr. Melchor Marie, 51 y.o. male is followed for:     Acute on chronic respiratory failure with hypoxia (HCC)    Pulmonary fibrosis, presumed IPF currently on antifibrotic and 8 L nasal cannula oxygen at home    PVD (peripheral vascular disease) with claudication (HCC)    Essential hypertension    Type 2 diabetes mellitus, without long-term current use of insulin (HCC)    Abnormal chest x-ray, rule out pneumonia versus IPF exacerbation versus          Subjective   This is an unfortunate 51-year-old gentleman with a past medical history significant for prior cigarette abuse as well as a diagnosis of probable idiopathic pulmonary fibrosis since at least 2017 currently on pirfenidone antifibrotic therapy who also is typically on 7 to 8 L of nasal cannula oxygen at home.  At baseline he is very short of breath and limited in his activities.  By records, he previously had been under evaluation at  for lung transplant and while details of that are not completely forthcoming, apparently there was a concern about possible compliance and some psychiatric concerns.  He follows with Dr. Padilla, pulmonology, and [place].       He apparently presented to the emergency department yesterday in [place], [place], with complaints of approximately 2 days of worsened shortness of breath.  He denied any fevers but states he did have perhaps some chills the other day.  He has not been bringing up much in the way of secretions however when he does bring it up it is yellow in coloration.  He states he typically does not have sputum expectoration with a cough.  He denied hemoptysis.  He has denied rashes.  His saturations have gotten as low as the 50s per his report and he has required higher levels of oxygen support since his arrival here.  At that emergency department he underwent a CT scan of the chest which I believe was with contrast.  Apparently there were no pulmonary  emboli.  The report suggested diffuse bilateral groundglass disease.  Swabs for flu and COVID were negative.  Unfortunately I do not have these films to review myself.  Chest x-ray at our facility does show bilateral interstitial changes consistent with his known fibrosis which appear to be grossly unchanged compared to a 2021 film.  Interval History:  The chart has been reviewed.  The patient has remained afebrile.  He remains on 80% high flow nasal cannula.  He states he does not feel any worse than yesterday however.  He is not bringing up much in the way of sputum currently.  Chest x-ray was reviewed and shows his chronic fibrotic changes without new infiltrate.  His only complaint is feeling of weakness in his lower extremities which is persisted for several weeks.  He states that he does have a little bit of pain in his lower extremities as well.  There has been no swelling.    The patient's relevant past medical, surgical and social history were reviewed and updated in Epic as appropriate.     Review of systems was completed and is negative except as detailed above.     Objective     Medications:  cefTRIAXone, 1 g, Intravenous, Q24H   And  azithromycin, 500 mg, Intravenous, Q24H  budesonide-formoterol, 2 puff, Inhalation, BID - RT  divalproex, 750 mg, Oral, BID  FLUoxetine, 40 mg, Oral, QAM  heparin (porcine), 5,000 Units, Subcutaneous, Q8H  insulin detemir, 50 Units, Subcutaneous, Daily  insulin lispro, 0-14 Units, Subcutaneous, Q3H  Insulin Lispro, 5 Units, Subcutaneous, TID With Meals  ipratropium-albuterol, 3 mL, Nebulization, Q6H While Awake - RT  lisinopril, 20 mg, Oral, Daily  methylPREDNISolone sodium succinate, 500 mg, Intravenous, Q12H  nicotine, 1 patch, Transdermal, Q24H  pantoprazole, 40 mg, Oral, Q AM  Pirfenidone, 267 mg, Oral, TID  QUEtiapine, 25 mg, Oral, BID  QUEtiapine, 300 mg, Oral, Nightly  sodium chloride, 10 mL, Intravenous, Q12H        Vital Sign Min/Max for last 24 hours  Temp  Min:  "97 °F (36.1 °C)  Max: 98.3 °F (36.8 °C)   BP  Min: 93/60  Max: 110/68   Pulse  Min: 67  Max: 96   Resp  Min: 20  Max: 35   SpO2  Min: 89 %  Max: 100 %   Flow (L/min)  Min: 55  Max: 60       Input/Output for last 24 hour shift  05/07 0701 - 05/08 0700  In: 2150 [P.O.:1550; I.V.:250]  Out: 3325 [Urine:3325]     Objective:  General Appearance:  Uncomfortable and ill-appearing (In no distress.  Currently on high flow nasal cannula).    Vital signs: (most recent): Blood pressure 103/68, pulse 85, temperature 97 °F (36.1 °C), temperature source Oral, resp. rate 24, height 170.2 cm (67\"), weight 83.6 kg (184 lb 6.4 oz), SpO2 98 %.    Lungs:  He is not in respiratory distress.  (Coarse rales bilaterally.)  Heart: Normal rate.  Regular rhythm.  S1 normal and S2 normal.  No murmur.   Abdomen: Abdomen is soft and non-distended.  Bowel sounds are normal.   There is no abdominal tenderness.     Extremities: Normal range of motion.  There is no dependent edema.    Neurological: Patient is alert and oriented to person, place and time.  Normal strength.    Pupils:  Pupils are equal, round, and reactive to light.    Skin:  Warm.              Results from last 7 days   Lab Units 05/08/22  0611 05/07/22  0725 05/06/22  1358   WBC 10*3/mm3 6.39 7.04 11.97*   HEMOGLOBIN g/dL 15.2 14.7 17.3   PLATELETS 10*3/mm3 222 220 310     Results from last 7 days   Lab Units 05/08/22  0611 05/07/22  0725 05/06/22  1358   SODIUM mmol/L 133* 138 131*   POTASSIUM mmol/L 4.7 4.3 4.5   CO2 mmol/L 28.0 29.0 26.0   BUN mg/dL 14 15 15   CREATININE mg/dL 0.56* 0.76 0.84   MAGNESIUM mg/dL 2.0 2.0  --    GLUCOSE mg/dL 426* 248* 351*     Estimated Creatinine Clearance: 163.2 mL/min (A) (by C-G formula based on SCr of 0.56 mg/dL (L)).    Results from last 7 days   Lab Units 05/07/22  1900   PH, ARTERIAL pH units 7.431   PCO2, ARTERIAL mm Hg 45.8*   PO2 ART mm Hg 152.0*          I reviewed the patient's results and images.     Assessment/Plan   Impression & Plan "       Acute on chronic respiratory failure with hypoxia (HCC)    Pulmonary fibrosis, presumed IPF currently on antifibrotic and 8 L nasal cannula oxygen at home    PVD (peripheral vascular disease) with claudication (HCC)    Essential hypertension    Type 2 diabetes mellitus, without long-term current use of insulin (HCC)    Abnormal chest x-ray, rule out pneumonia versus IPF exacerbation versus       Continue with current oxygen support.  He is still requiring high levels of high flow oxygen.  He is tolerating very high dose steroids currently.  He will finish these tomorrow and at that point will need to be placed on lower dose IV Solu-Medrol.  Hopefully this is going to benefit him though he has not shown much improvement as yet.  Continue with current antimicrobials.  We will try to do the best we can with his glucose control given his high steroid doses currently.  Mobilize as able though with his current oxygen demands he likely will not be able to tolerate this.  Continue with DVT prophylaxis.  Diurese with 1 dose of Bumex now.  Depending on his progress, we may need to consider consultation with [facility] or another transplant center regarding the possibility of reevaluation for transplant.  Currently however he is much too ill for this.  We will follow along very closely.      I discussed the patient's findings and my recommendations with patient       Brock Rivas MD, [facility]  Pulmonary and Critical Care Medicine  05/08/22 12:56 EDT

## 2022-05-09 NOTE — THERAPY EVALUATION
Patient Name: Melchor Marie  : 1970    MRN: [id number]                              Today's Date: 2022       Admit Date: 2022    Visit Dx:     ICD-10-CM ICD-9-CM   1. Hypoxia  R09.02 799.02   2. Multifocal pneumonia  J18.9 486   3. Shortness of breath  R06.02 786.05   4. Referred by primary care physician  Z76.89 V68.89     Patient Active Problem List   Diagnosis   • PVD (peripheral vascular disease) with claudication (HCC)   • Hyperlipidemia LDL goal <70   • Essential hypertension   • Tobacco use   • Intractable abdominal pain   • Bulimia nervosa   • Duodenitis   • Starvation ketoacidosis   • Dehydration   • Hyponatremia   • Chest pain at rest   • Type 2 diabetes mellitus, without long-term current use of insulin (HCC)   • Hypoxia   • Acute on chronic respiratory failure with hypoxia (HCC)   • Pulmonary fibrosis, presumed IPF currently on antifibrotic and 8 L nasal cannula oxygen at home   • Abnormal chest x-ray, rule out pneumonia versus IPF exacerbation versus     Past Medical History:   Diagnosis Date   • Chest pain    • Cholelithiasis    • Hyperlipidemia    • Hypertension    • Nerve damage     tooth pick injury   • Tobacco use    • Type 2 diabetes mellitus (HCC)      Past Surgical History:   Procedure Laterality Date   • CHOLECYSTECTOMY     • ENDOSCOPY N/A 2021    Procedure: ESOPHAGOGASTRODUODENOSCOPY;  Surgeon: Servando Tyler MD;  Location: [facility] ENDOSCOPY;  Service: Gastroenterology;  Laterality: N/A;   • FOOT SURGERY Right       General Information     Row Name 22 1550          Physical Therapy Time and Intention    Document Type evaluation  -CD     Mode of Treatment physical therapy  -CD     Row Name 22 1550          General Information    Patient Profile Reviewed yes  -CD     Prior Level of Function independent:;all household mobility;gait;transfer;bed mobility;feeding;grooming;dressing;max assist:  INCREASED DIFFICULTY RECENTLY., HAS BEEN SPONGE BATHING. HAS DIFFICULTY  "USING R WX IN HOUSE DUE TO SPACE. ON 8L O2 AT BASELINE.  -CD     Existing Precautions/Restrictions fall;oxygen therapy device and L/min  CURRENTLY ON HI-FLOW O2 70%  -CD     Barriers to Rehab medically complex;previous functional deficit  -CD     Row Name 05/09/22 1550          Living Environment    People in Home spouse  WIFE WORKS DURING THE DAY,  -     Row Name 05/09/22 1550          Home Main Entrance    Number of Stairs, Main Entrance none  -CD     Row Name 05/09/22 1550          Stairs Within Home, Primary    Number of Stairs, Within Home, Primary none  -CD     Row Name 05/09/22 1550          Cognition    Orientation Status (Cognition) oriented x 4  -CD     Row Name 05/09/22 1550          Safety Issues, Functional Mobility    Safety Issues Affecting Function (Mobility) insight into deficits/self-awareness;safety precaution awareness;safety precautions follow-through/compliance  -CD     Impairments Affecting Function (Mobility) endurance/activity tolerance;balance;sensation/sensory awareness;shortness of breath  -CD           User Key  (r) = Recorded By, (t) = Taken By, (c) = Cosigned By    Initials Name Provider Type    CD Carol Johnson, PT Physical Therapist               Mobility     Row Name 05/09/22 1555          Bed Mobility    Bed Mobility supine-sit  -CD     Supine-Sit [place] (Bed Mobility) standby assist  -CD     Assistive Device (Bed Mobility) bed rails;head of bed elevated  -CD     Comment, (Bed Mobility) NO LOB UPON SITTING.  -     Row Name 05/09/22 1555          Transfers    Comment, (Transfers) CUES FOR HAND PLACEMENT. STS VIA B UE SUPPORT FOR BALANCE. C/O PAIN B LE'S UPON STANDING AND WITH ROM/MMT ASSESSMENT.  -     Row Name 05/09/22 1555          Bed-Chair Transfer    Bed-Chair [place] (Transfers) minimum assist (75% patient effort);verbal cues  -CD     Comment, (Bed-Chair Transfer) PT REPORTS \"I ABOUT GAVE OUT ON YOU\" NO BUCKLING NOTED.  -     Row Name 05/09/22 4035          " Sit-Stand Transfer    Sit-Stand [place] (Transfers) contact guard;verbal cues  -CD     Assistive Device (Sit-Stand Transfers) --  B UE SUPPORT.  -CD     Row Name 05/09/22 1555          Gait/Stairs (Locomotion)    [place] Level (Gait) other (see comments)  DEFERRED DUE TO RESPIRATORY STATUS, FATIGUE.  -CD           User Key  (r) = Recorded By, (t) = Taken By, (c) = Cosigned By    Initials Name Provider Type     Carol Johnson, PT Physical Therapist               Obj/Interventions     Row Name 05/09/22 1601          Range of Motion Comprehensive    General Range of Motion bilateral lower extremity ROM WFL  -CD     Row Name 05/09/22 1601          Strength Comprehensive (MMT)    Comment, General Manual Muscle Testing (MMT) Assessment B LE GROSSLY 3+/5 TO 4/5.  NOTED INCONSISTENT EFFORT WITH MMT.  -CD     Row Name 05/09/22 1601          Balance    Balance Assessment sitting static balance  -CD     Static Sitting Balance independent  -CD     Dynamic Sitting Balance standby assist  -CD     Position, Sitting Balance supported;sitting edge of bed  -CD     Static Standing Balance contact guard  -CD     Dynamic Standing Balance minimal assist  -CD     Position/Device Used, Standing Balance supported  B UE SUPPORT.  -CD     Balance Interventions sitting;standing;sit to stand;supported;static;dynamic;weight shifting activity  -CD     Comment, Balance STAND STEP PIVOT TRANSFER WITH MIN ASSIST. NO OVERT LOB OR BUCKLING NOTED.  -CD           User Key  (r) = Recorded By, (t) = Taken By, (c) = Cosigned By    Initials Name Provider Type     Carol Johnson, PT Physical Therapist               Goals/Plan     Row Name 05/09/22 1610          Bed Mobility Goal 1 (PT)    Activity/Assistive Device (Bed Mobility Goal 1, PT) supine to sit  -CD     [place] Level/Cues Needed (Bed Mobility Goal 1, PT) independent  -CD     Time Frame (Bed Mobility Goal 1, PT) long term goal (LTG);2 weeks  -CD     Row Name 05/09/22 1610           Transfer Goal 1 (PT)    Activity/Assistive Device (Transfer Goal 1, PT) sit-to-stand/stand-to-sit;bed-to-chair/chair-to-bed;walker, rolling  -CD     [place] Level/Cues Needed (Transfer Goal 1, PT) independent  -CD     Time Frame (Transfer Goal 1, PT) long term goal (LTG);2 weeks  -CD     Row Name 05/09/22 1610          Gait Training Goal 1 (PT)    Activity/Assistive Device (Gait Training Goal 1, PT) gait (walking locomotion);walker, rolling  -CD     Distance (Gait Training Goal 1, PT) 200 FEET  -CD     Time Frame (Gait Training Goal 1, PT) long term goal (LTG);2 weeks  -CD     Row Name 05/09/22 1610          Therapy Assessment/Plan (PT)    Planned Therapy Interventions (PT) balance training;bed mobility training;gait training;transfer training;strengthening;home exercise program;patient/family education  -CD           User Key  (r) = Recorded By, (t) = Taken By, (c) = Cosigned By    Initials Name Provider Type    CD Carol Johnson, PT Physical Therapist               Clinical Impression     Row Name 05/09/22 1605          Pain    Pain Location - Side/Orientation Bilateral  -CD     Pain Location lower  -CD     Pain Location - extremity  -CD     Pre/Posttreatment Pain Comment REPORTS RECENT PAIN B FEET, LOWER LEGS AD H/O NEUROPATHY.  -CD     Pain Intervention(s) Ambulation/increased activity;Repositioned  -CD     Row Name 05/09/22 0694          Plan of Care Review    Plan of Care Reviewed With patient  -CD     Outcome Evaluation PT PRESENTS WITH EVOLVING SYMPTOMS TO INCLUDE IMPAIRED BALANCE, DECREASED RESPIRATORY STATUS, GENERALIZED WEAKNESS, DECREASED ENDURANCE, B LE PAIN AND DECLINE IN FUNCTIONAL MOBILITY. PT CURRENTLY ON HI-FLOW O2 AND IS LIMITED BY SOA WITH ACTIVITY AND DROP IN O2 SATS. REQUIRED MIN ASSIST FOR STAND STEP PIVOT TRANSFER AND CUES FOR PLB.  RECOMMEND PULMONARY REHAB AT D/C.  -CD     Row Name 05/09/22 0982          Therapy Assessment/Plan (PT)    Rehab Potential (PT) fair, will monitor progress  closely  -CD     Criteria for Skilled Interventions Met (PT) yes  -CD     Therapy Frequency (PT) daily  -CD     Row Name 05/09/22 1604          Vital Signs    Pre Systolic BP Rehab 110  -CD     Pre Treatment Diastolic BP 68  -CD     Post Systolic BP Rehab 119  -CD     Post Treatment Diastolic BP 82  -CD     Posttreatment Heart Rate (beats/min) 85  -CD     Pre SpO2 (%) 90  -CD     O2 Delivery Pre Treatment hi-flow  -CD     Intra SpO2 (%) 87  -CD     O2 Delivery Intra Treatment hi-flow  -CD     Post SpO2 (%) 90  -CD     O2 Delivery Post Treatment supplemental O2  -CD     Pre Patient Position Supine  -CD     Intra Patient Position Standing  -CD     Post Patient Position Sitting  -CD     Row Name 05/09/22 1604          Positioning and Restraints    Pre-Treatment Position in bed  -CD     Post Treatment Position chair  -CD     In Chair reclined;call light within reach;encouraged to call for assist;exit alarm on;with family/caregiver;legs elevated;notified nsg  ON HI-FLOW O2.  -CD           User Key  (r) = Recorded By, (t) = Taken By, (c) = Cosigned By    Initials Name Provider Type    Carol Calhoun, PT Physical Therapist               Outcome Measures     Row Name 05/09/22 1611          How much help from another person do you currently need...    Turning from your back to your side while in flat bed without using bedrails? 3  -CD     Moving from lying on back to sitting on the side of a flat bed without bedrails? 3  -CD     Moving to and from a bed to a chair (including a wheelchair)? 3  -CD     Standing up from a chair using your arms (e.g., wheelchair, bedside chair)? 3  -CD     Climbing 3-5 steps with a railing? 3  -CD     To walk in hospital room? 3  -CD     AM-PAC 6 Clicks Score (PT) 18  -CD     Highest level of mobility 6 --> Walked 10 steps or more  -CD           User Key  (r) = Recorded By, (t) = Taken By, (c) = Cosigned By    Initials Name Provider Type    Carol Calhoun, PT Physical Therapist                              Physical Therapy Education                 Title: PT OT SLP Therapies (In Progress)     Topic: Physical Therapy (Done)     Point: Mobility training (Done)     Learning Progress Summary           Patient Acceptance, E, VU,NR by CD at 5/9/2022 1612    Comment: BENEFITS OF OOB ACTIVITY, SAFETY WITH MOBILITY, PROGRESSION OF POC, D/C PLANNING.                   Point: Home exercise program (Done)     Learning Progress Summary           Patient Acceptance, E, VU,NR by CD at 5/9/2022 1612    Comment: BENEFITS OF OOB ACTIVITY, SAFETY WITH MOBILITY, PROGRESSION OF POC, D/C PLANNING.                   Point: Body mechanics (Done)     Learning Progress Summary           Patient Acceptance, E, VU,NR by CD at 5/9/2022 1612    Comment: BENEFITS OF OOB ACTIVITY, SAFETY WITH MOBILITY, PROGRESSION OF POC, D/C PLANNING.                   Point: Precautions (Done)     Learning Progress Summary           Patient Acceptance, E, VU,NR by CD at 5/9/2022 1612    Comment: BENEFITS OF OOB ACTIVITY, SAFETY WITH MOBILITY, PROGRESSION OF POC, D/C PLANNING.                               User Key     Initials Effective Dates Name Provider Type Discipline    CD 06/16/21 -  Carol Johnson, PT Physical Therapist PT              PT Recommendation and Plan  Planned Therapy Interventions (PT): balance training, bed mobility training, gait training, transfer training, strengthening, home exercise program, patient/family education  Plan of Care Reviewed With: patient  Outcome Evaluation: PT PRESENTS WITH EVOLVING SYMPTOMS TO INCLUDE IMPAIRED BALANCE, DECREASED RESPIRATORY STATUS, GENERALIZED WEAKNESS, DECREASED ENDURANCE, B LE PAIN AND DECLINE IN FUNCTIONAL MOBILITY. PT CURRENTLY ON HI-FLOW O2 AND IS LIMITED BY SOA WITH ACTIVITY AND DROP IN O2 SATS. REQUIRED MIN ASSIST FOR STAND STEP PIVOT TRANSFER AND CUES FOR PLB.  RECOMMEND PULMONARY REHAB AT D/C.     Time Calculation:    PT Charges     Row Name 05/09/22 5561             Time  Calculation    Start Time 1517  -CD      PT Received On 05/09/22  -CD      PT Goal Re-Cert Due Date 05/19/22  -CD              Time Calculation- PT    Total Timed Code Minutes- PT 11 minute(s)  -CD              Timed Charges    03753 - PT Therapeutic Activity Minutes 11  -CD              Untimed Charges    PT Eval/Re-eval Minutes 60  -CD              Total Minutes    Timed Charges Total Minutes 11  -CD      Untimed Charges Total Minutes 60  -CD       Total Minutes 71  -CD            User Key  (r) = Recorded By, (t) = Taken By, (c) = Cosigned By    Initials Name Provider Type    CD Carol Johnson, PT Physical Therapist              Therapy Charges for Today     Code Description Service Date Service Provider Modifiers Qty    27393611905 HC PT THERAPEUTIC ACT EA 15 MIN 5/9/2022 Carol Johnson, PT GP 1    15641916846 HC PT EVAL MOD COMPLEXITY 4 5/9/2022 Carol Johnson PT GP 1          PT G-Codes  Outcome Measure Options: AM-PAC 6 Clicks Daily Activity (OT)  AM-PAC 6 Clicks Score (PT): 18  AM-PAC 6 Clicks Score (OT): 15    Carol Johnson, PT  5/9/2022

## 2022-05-09 NOTE — PLAN OF CARE
Goal Outcome Evaluation:  Plan of Care Reviewed With: patient           Outcome Evaluation: PT PRESENTS WITH EVOLVING SYMPTOMS TO INCLUDE IMPAIRED BALANCE, DECREASED RESPIRATORY STATUS, GENERALIZED WEAKNESS, DECREASED ENDURANCE, B LE PAIN AND DECLINE IN FUNCTIONAL MOBILITY. PT CURRENTLY ON HI-FLOW O2 AND IS LIMITED BY SOA WITH ACTIVITY AND DROP IN O2 SATS. REQUIRED MIN ASSIST FOR STAND STEP PIVOT TRANSFER AND CUES FOR PLB.  RECOMMEND PULMONARY REHAB AT D/C.

## 2022-05-09 NOTE — CASE MANAGEMENT/SOCIAL WORK
Discharge Planning Assessment  [facility]     Patient Name: Melchor Marie  MRN: [id number]  Today's Date: 5/9/2022    Admit Date: 5/6/2022     Discharge Needs Assessment     Row Name 05/09/22 1546       Living Environment    People in Home spouse    Name(s) of People in Home Wife Rose Marie    Current Living Arrangements apartment    Primary Care Provided by spouse/significant other    Provides Primary Care For no one    Family Caregiver if Needed spouse;parent(s)    Family Caregiver Names Mother Missy Marie    Quality of Family Relationships helpful;involved;supportive    Able to Return to Prior Arrangements --  tbd       Resource/Environmental Concerns    Resource/Environmental Concerns none       Transition Planning    Patient/Family Anticipates Transition to inpatient rehabilitation facility    Patient/Family Anticipated Services at Transition ;skilled nursing    Transportation Anticipated family or friend will provide       Discharge Needs Assessment    Readmission Within the Last 30 Days no previous admission in last 30 days    Current Outpatient/Agency/Support Group homecare agency  does not know which agency    Equipment Currently Used at Home cane, straight;walker, rolling    Concerns to be Addressed discharge planning;adjustment to diagnosis/illness    Anticipated Changes Related to Illness inability to care for self    Equipment Needed After Discharge shower chair    Discharge Facility/Level of Care Needs acute rehab;nursing facility, skilled    Current Discharge Risk chronically ill;dependent with mobility/activities of daily living;psychiatric illness               Discharge Plan     Row Name 05/09/22 1549       Plan    Plan inpatient rehab    Plan Comments   I spoke to Mr. Marie's wife Rose today over the phone to discuss discharge planning. Mr. Marie lives in [place] with Rose and their dog. Prior to admission, he has become increasingly weak requiring assistance with  adl's.     He has a rolling walker and cane in the home. He wears continuous oxygen at 8 LPM (unsure if that is his preference or prescribed 8L nasal cannula). Rose advises that he is current with home health however, she does not know which agency.     Mr. Marie is currently between HiFlo nasal cannula and the BiPap to keep Sp02> 90%. Physical and occupational therapy recommend inpatient rehab, Rose is agreeable. Therapy also suggested inpatient pulmonary rehab however, there is only outpatient pulmonary rehab available in [facility].     Case management will continue to follow Mr. Marie's progress and provide for him a safe discharge plan. Rose will be available for transportation by private vehicle at the time of discharge.        Final Discharge Disposition Code 62 - inpatient rehab facility           Expected Discharge Date and Time     Expected Discharge Date Expected Discharge Time    May 13, 2022          Demographic Summary     Row Name 05/09/22 1546       General Information    General Information Comments Primary provider is Sylvia Parker.   Fully vaccinated for Covid with 1 booster.   No advanced directive or living will.               Functional Status     Row Name 05/09/22 4276       Functional Status    Usual Activity Tolerance poor    Current Activity Tolerance poor       Functional Status, IADL    Medications assistive person    Meal Preparation assistive person    Housekeeping completely dependent    Laundry completely dependent    Shopping completely dependent       Mental Status Summary    Recent Changes in Mental Status/Cognitive Functioning unable to assess       Employment/    Employment Status disabled           Park Rasmussen RN

## 2022-05-09 NOTE — PROGRESS NOTES
Neurology Note    Patient:  Melchor Marie    YOB: 1970    REFERRING PHYSICIAN:  Dr. Miranda    CHIEF COMPLAINT:    syncope    HISTORY OF PRESENT ILLNESS:   The patient reports that he still gets dizzy and may pass out if he coughs hard. Unable to complete MRI 22 hypoxia. EEG negative.    Past Medical History:  Past Medical History:   Diagnosis Date   • Chest pain    • Cholelithiasis    • Hyperlipidemia    • Hypertension    • Nerve damage     tooth pick injury   • Tobacco use    • Type 2 diabetes mellitus (HCC)        Past Surgical History:  Past Surgical History:   Procedure Laterality Date   • CHOLECYSTECTOMY     • ENDOSCOPY N/A 7/27/2021    Procedure: ESOPHAGOGASTRODUODENOSCOPY;  Surgeon: Servando Tyler MD;  Location: [facility] ENDOSCOPY;  Service: Gastroenterology;  Laterality: N/A;   • FOOT SURGERY Right        Social History:   Social History     Socioeconomic History   • Marital status:    Tobacco Use   • Smoking status: Heavy Tobacco Smoker     Packs/day: 0.50     Types: Cigarettes   • Smokeless tobacco: Never Used   Vaping Use   • Vaping Use: Never used   Substance and Sexual Activity   • Alcohol use: Not Currently   • Drug use: Never   • Sexual activity: Defer        Family History:   Family History   Problem Relation Age of Onset   • Coronary artery disease Mother 52        CABG*47   • Diabetes Father    • Diabetes Other        Medications Prior to Admission:    Prior to Admission medications    Medication Sig Start Date End Date Taking? Authorizing Provider   albuterol sulfate  (90 Base) MCG/ACT inhaler Inhale 2 puffs Every 6 (Six) Hours As Needed for Shortness of Air.   Yes Nabil Schmidt MD   ALPRAZolam (XANAX) 1 MG tablet Take 1 mg by mouth 2 (Two) Times a Day As Needed for Anxiety.   Yes Nabil Schmidt MD   aspirin (aspirin) 81 MG EC tablet Take 1 tablet by mouth Daily. 5/27/21  Yes Hang Wagoner MD   busPIRone (BUSPAR) 30 MG tablet Take 30 mg by  mouth 2 (two) times a day. 5/6/21  Yes Nabil Schmidt MD   dapagliflozin (Farxiga) 5 MG tablet tablet Take 5 mg by mouth Daily.   Yes Nabil Schmidt MD   divalproex (DEPAKOTE) 500 MG DR tablet Take 750 mg by mouth 2 (Two) Times a Day. 5/10/21  Yes Nabil Schmidt MD   FLUoxetine (PROzac) 40 MG capsule Take 40 mg by mouth Every Morning. 8/11/21  Yes Nabil Schmidt MD   hydrOXYzine (ATARAX) 10 MG tablet Take 10 mg by mouth Every 8 (Eight) Hours As Needed for Itching, Allergies or Anxiety. 5/10/21  Yes Nabil Schmidt MD   Insulin Glargine (BASAGLAR KWIKPEN) 100 UNIT/ML injection pen Inject 30 Units under the skin into the appropriate area as directed 2 (Two) Times a Day. 8/11/21  Yes Nabil Schmidt MD   lisinopril (PRINIVIL,ZESTRIL) 20 MG tablet Take 20 mg by mouth Daily. 5/10/21  Yes Nabil Schmidt MD   metFORMIN ER (GLUCOPHAGE-XR) 500 MG 24 hr tablet TAKE 2 TABLETS BY MOUTH EVERY EVENING WITH A MEAL 9/2/21  Yes Nabil Schmidt MD   nicotine (NICODERM CQ) 21 MG/24HR patch Place 1 patch on the skin as directed by provider Daily.   Yes Nabil Schmidt MD   pantoprazole (PROTONIX) 40 MG EC tablet Take 1 tablet by mouth 2 (two) times a day. 9/8/21  Yes Jory Patrick APRN   PIRFENIDONE PO Take 267 mg by mouth 3 (Three) Times a Day.   Yes Nabil Schmidt MD   QUEtiapine (SEROquel) 25 MG tablet Take 25 mg by mouth 2 (Two) Times a Day. 8 am and 1 pm   Yes Nabil Schmidt MD   QUEtiapine (SEROquel) 300 MG tablet Take 300 mg by mouth Every Night. 5/10/21  Yes Nabil Schmidt MD   Rivaroxaban (Xarelto) 2.5 MG tablet Take 1 tablet by mouth 2 (Two) Times a Day. 5/27/21  Yes Hang Wagoner MD   rosuvastatin (CRESTOR) 10 MG tablet Take 10 mg by mouth Every Night.   Yes ProviderNabil MD   Sod Picosulfate-Mag Ox-Cit Acd 10-3.5-12 MG-GM -GM/160ML solution Take 1 kit by mouth Take As Directed. Follow instructions mailed to your home. If you  did not receive instructions please call; [phone]. 9/9/21  Yes Jory Patrick APRN   Spiriva HandiHaler 18 MCG per inhalation capsule Place 1 capsule into inhaler and inhale Daily. 4/15/21  Yes ProviderNabil MD   sucralfate (CARAFATE) 1 g tablet Take 1 tablet by mouth 4 (Four) Times a Day Before Meals & at Bedtime. 9/8/21  Yes Jory Patrick APRN   Symbicort 160-4.5 MCG/ACT inhaler Inhale 2 puffs 2 (Two) Times a Day. 4/15/21  Yes ProvideraNbil MD       Allergies:  Patient has no known allergies.      Review of system  Review of Systems   Neurological: Positive for dizziness and syncope.   All other systems reviewed and are negative.      Vitals:    05/09/22 0855   BP: 105/76   Pulse: 86   Resp:    Temp:    SpO2:        Physical exam  Physical Exam  Cardiovascular:      Rate and Rhythm: Normal rate.   Pulmonary:      Effort: Pulmonary effort is normal.   Neurological:      Comments: Alerts to voice, speech clear, no facial droop, no limb drift.           Lab Results   Component Value Date    WBC 12.26 (H) 05/09/2022    HGB 14.7 05/09/2022    HCT 41.9 05/09/2022    MCV 88.2 05/09/2022     05/09/2022     Lab Results   Component Value Date    GLUCOSE 173 (H) 05/09/2022    BUN 18 05/09/2022    CREATININE 0.57 (L) 05/09/2022    EGFRIFNONA 117 08/10/2021    BCR 31.6 (H) 05/09/2022    CO2 33.0 (H) 05/09/2022    CALCIUM 9.1 05/09/2022    ALBUMIN 4.00 05/06/2022    AST 32 05/06/2022    ALT 29 05/06/2022         Radiological Studies:  EEG    Result Date: 5/9/2022  Reason for referral: 51 y.o.male with syncope Technical Summary:  A 19 channel digital EEG was performed using the international 10-20 placement system, including eye leads and EKG leads. Duration: 20 minutes Findings: The awake tracing shows diffuse medium amplitude 5-10 Hz intermixed theta and alpha activity which is present symmetrically over both hemispheres.  Occasional and brief bursts of 3-4 Hz generalized delta are seen.   Photic stimulation does not change the background.  A clear posterior rhythm is not seen.  Sleep is seen with slowing of the background and sleep spindles.  No focal features are seen.  No epileptiform activity is seen.  Hyperventilation is not performed. Video: On Technical quality: Good EKG: Regular, 70 bpm SUMMARY: Mild intermittent generalized slow No focal features or epileptiform activity are seen     Diffuse cerebral dysfunction of exceedingly mild degree, nonspecific No evidence for epilepsy is seen This report is transcribed using the Dragon dictation system.      XR Chest 1 View    Result Date: 5/9/2022  DATE OF EXAM: 5/9/2022 6:25 AM  PROCEDURE: XR CHEST 1 VW-  INDICATIONS: Respiratory failure, fibrosis; R09.02-Hypoxemia; J18.9-Pneumonia, unspecified organism; R06.02-Shortness of breath; Z76.89-Persons encountering health services in other specified circumstances  COMPARISON: 5/8/2022  TECHNIQUE: Single radiographic AP view of the chest was obtained.  FINDINGS: Stable cardiomediastinal silhouette with redemonstration of mild cardiomegaly. There is diffuse coarsened interstitial changes throughout the lungs compatible with underlying fibrosis. No definite focal consolidation or evidence of new airspace disease. No pleural effusion or pneumothorax.      No significant interval change.  This report was finalized on 5/9/2022 8:27 AM by Noe Peters MD.      XR Chest 1 View    Result Date: 5/9/2022  XR CHEST 1 VW-  Date of Exam: 5/8/2022 8:00 PM  Indication: Syncope/hypoxia; R09.02-Hypoxemia; J18.9-Pneumonia, unspecified organism; R06.02-Shortness of breath; Z76.89-Persons encountering health services in other specified circumstances.  Comparison:?05/08/2022 at 7:42 AM, 05/06/2022  Technique:?A single view of the chest was obtained.  FINDINGS:  Cardiomegaly is stable.  There are persistent coarsened interstitial markings throughout both lungs consistent with changes of chronic lung disease.  There is no  focal airspace consolidation.  No definite pneumothorax.  No evidence of pleural effusion.        1.  No change in prominent interstitial markings throughout both lungs compatible changes of chronic interstitial lung disease and pulmonary fibrosis.  No new focal airspace consolidation.   This report was finalized on 5/9/2022 7:57 AM by Adan Griffiths MD.      XR Chest 1 View    Result Date: 5/8/2022  DATE OF EXAM: 5/8/2022 7:46 AM  PROCEDURE: XR CHEST 1 VW-  INDICATIONS: follow up pulm fibrosis, infiltrates, assess volume status; R09.02-Hypoxemia; J18.9-Pneumonia, unspecified organism; R06.02-Shortness of breath; Z76.89-Persons encountering health services in other specified circumstances  COMPARISON: 05/06/2022  TECHNIQUE: Single radiographic AP view of the chest was obtained.  FINDINGS: Heart size stable. Stable appearance of diffuse interstitial markings throughout both lungs. No focal airspace consolidation. Costophrenic angles sharp      Stable appearance of diffuse interstitial lung disease, presumably fibrosis. No acute infiltrate demonstrated currently  This report was finalized on 5/8/2022 8:13 AM by Shola Lopez.      XR Chest 1 View    Result Date: 5/6/2022  DATE OF EXAM: 5/6/2022 1:58 PM  PROCEDURE: XR CHEST 1 VW-  INDICATIONS: SOA triage protocol  COMPARISON: 8/10/2021  TECHNIQUE: Single radiographic AP view of the chest was obtained.  FINDINGS: Heart shadow is normal in size. Vasculature appears normal. Diffuse and extensive reticular interstitial disease throughout the lungs appears to be increased from the prior study, although in part this is due to lower lung volumes. There appears to be focally increased disease in the left upper lung out of proportion to changes elsewhere, at least potentially a superimposed pneumonia. No dense lung consolidation effusion or pneumothorax is seen.       1. Extensive bilateral pulmonary interstitial disease, similar to 8/10/2021 and presumably representing  fibrosis. 2. Generalized worsening appearance of the chest in part due to low lung volumes. 3. Asymmetrically denser disease of the left upper lung may represent a superimposed pneumonia.  This report was finalized on 5/6/2022 2:05 PM by Dr. Arnold Sr MD.          During this visit the following were done:  Labs Reviewed [x]    Labs Ordered []    Radiology Reports Reviewed [x]    Radiology Ordered []    EKG, echo, and/or stress test reviewed []    EEG results reviewed  [x]    EEG reviewed and interpreted per myself   []    Discussed case with neurointerventionalist or neuroradiologist []    Referring Provider Records Reviewed []    ER Records Reviewed []    Hospital Records Reviewed []    History Obtained From Family []    Radiological images view and Interpreted per myself []    Case Discussed with referring provider []     Decision to obtain and request outside records  []        Assessment and Plan       Tussive headaches and syncope. Anxiety improved with Xanax. H/O pseudoseizures. PNA. EEG w/o epileptic changes. Unable to tolerate MRI.   - Cancel MRI.   - Consider cough suppression.   - Outpatient neurology F/U prn.    Call for questions, will see prn. Thanks,            Electronically signed by Enrike Diamond MD on 5/9/2022 at 11:22 EDT

## 2022-05-09 NOTE — PROGRESS NOTES
[facility] Medicine Services  PROGRESS NOTE    Patient Name: Melchor Marie  : 1970  MRN: [id number]    Date of Admission: 2022  Primary Care Physician: Sylvia Parker PA    Subjective   Subjective     CC:  Hypoxia, dyspnea    HPI:  Has had couple more spells of somnolence, but responds to sternal rub and immediately at baseline mentation and normal telemetry readings, no tonic clonic activity. No sputum. No fever  ROS:  Gen- No fevers, chills  CV-intermittent chest soreness  Resp-dyspnea as above  GI- No N/V/D, abd pain        Objective   Objective     Vital Signs:   Temp:  [97.2 °F (36.2 °C)-98 °F (36.7 °C)] 97.2 °F (36.2 °C)  Heart Rate:  [53-99] 88  Resp:  [16-24] 16  BP: ()/(61-79) 119/76  Flow (L/min):  [55-60] 60     Physical Exam:  Constitutional: Appears to be fatigued and in slight distress, awake, alert, oriented to year, month, place, no distress. Hi flow canula in place  HENT: NCAT, mucous membranes moist  Respiratory: On high flow nasal cannula, poor respiratory effort, lungs clear currently  Cardiovascular: RRR, no murmurs, rubs, or gallops  Gastrointestinal: Positive bowel sounds, soft, nontender, nondistended  Musculoskeletal: No bilateral ankle edema  Psychiatric: Appropriate affect, cooperative, calm  Neurologic: Oriented x 3, strength symmetric in all extremities, Cranial Nerves grossly intact to confrontation, speech clear  Skin: No rashes      Results Reviewed:  LAB RESULTS:      Lab 22  0706 22  1958 22  0611 22  2244 22  1900 22  0725 22  1742 22  1358   WBC 12.26* 11.18* 6.39  --   --  7.04  --  11.97*   HEMOGLOBIN 14.7 14.6 15.2  --   --  14.7  --  17.3   HEMATOCRIT 41.9 40.4 42.5  --   --  44.5  --  50.7   PLATELETS 209 250 222  --   --  220  --  310   NEUTROS ABS 11.24* 10.15* 5.71  --   --  5.05  --  9.28*   IMMATURE GRANS (ABS) 0.04 0.05 0.02  --   --  0.02  --  0.04   LYMPHS ABS 0.74  0.57* 0.60*  --   --  1.42  --  1.68   MONOS ABS 0.23 0.40 0.05*  --   --  0.52  --  0.93*   EOS ABS 0.00 0.00 0.00  --   --  0.00  --  0.00   MCV 88.2 86.3 86.9  --   --  94.7  --  90.5   PROCALCITONIN  --   --   --   --   --   --   --  0.09   LACTATE  --  3.6* 1.4 2.7* 2.7*  --  1.6 2.8*         Lab 05/09/22  0706 05/08/22 1958 05/08/22 0611 05/07/22 0725 05/06/22  1358   SODIUM 138 134* 133* 138 131*   POTASSIUM 4.1 4.0 4.7 4.3 4.5   CHLORIDE 98 94* 95* 98 90*   CO2 33.0* 29.0 28.0 29.0 26.0   ANION GAP 7.0 11.0 10.0 11.0 15.0   BUN 18 14 14 15 15   CREATININE 0.57* 0.71* 0.56* 0.76 0.84   EGFR 119.4 111.8 120.1 109.5 106.2   GLUCOSE 173* 319* 426* 248* 351*   CALCIUM 9.1 9.2 9.0 8.6 9.8   MAGNESIUM 2.2  --  2.0 2.0  --    HEMOGLOBIN A1C  --   --  11.70*  --   --          Lab 05/06/22  1358   TOTAL PROTEIN 7.8   ALBUMIN 4.00   GLOBULIN 3.8   ALT (SGPT) 29   AST (SGOT) 32   BILIRUBIN 0.4   ALK PHOS 144*         Lab 05/08/22  0611 05/06/22  1358   PROBNP 568.3 1,824.0*   TROPONIN T  --  <0.010                 Lab 05/08/22 2002 05/07/22  1900   PH, ARTERIAL 7.453* 7.431   PCO2, ARTERIAL 45.3* 45.8*   PO2 ART 53.8* 152.0*   FIO2 80 70   HCO3 ART 31.7* 30.4*   BASE EXCESS ART 6.6* 5.2*   CARBOXYHEMOGLOBIN 1.4 1.4     Brief Urine Lab Results  (Last result in the past 365 days)      Color   Clarity   Blood   Leuk Est   Nitrite   Protein   CREAT   Urine HCG        05/09/22 1017 Yellow   Clear   Negative   Negative   Negative   Negative                 Microbiology Results Abnormal     Procedure Component Value - Date/Time    Blood Culture - Blood, Arm, Left [[id number]]  (Normal) Collected: 05/06/22 1500    Lab Status: Preliminary result Specimen: Blood from Arm, Left Updated: 05/08/22 1547     Blood Culture No growth at 2 days    Blood Culture - Blood, Arm, Right [[id number]]  (Normal) Collected: 05/06/22 1520    Lab Status: Preliminary result Specimen: Blood from Arm, Right Updated: 05/08/22 1546     Blood Culture No  growth at 2 days    S. Pneumo Ag Urine or CSF - Urine, Urine, Clean Catch [[id number]]  (Normal) Collected: 05/06/22 1822    Lab Status: Final result Specimen: Urine, Clean Catch Updated: 05/07/22 0639     Strep Pneumo Ag Negative    Legionella Antigen, Urine - Urine, Urine, Clean Catch [[id number]]  (Normal) Collected: 05/06/22 1822    Lab Status: Final result Specimen: Urine, Clean Catch Updated: 05/07/22 0639     LEGIONELLA ANTIGEN, URINE Negative    COVID PRE-OP / PRE-PROCEDURE SCREENING ORDER (NO ISOLATION) - Swab, Nasopharynx [[id number]]  (Normal) Collected: 05/06/22 1418    Lab Status: Final result Specimen: Swab from Nasopharynx Updated: 05/06/22 1445    Narrative:      The following orders were created for panel order COVID PRE-OP / PRE-PROCEDURE SCREENING ORDER (NO ISOLATION) - Swab, Nasopharynx.  Procedure                               Abnormality         Status                     ---------                               -----------         ------                     COVID-19 and FLU A/B PCR...[[id number]]  Normal              Final result                 Please view results for these tests on the individual orders.    COVID-19 and FLU A/B PCR - Swab, Nasopharynx [[id number]]  (Normal) Collected: 05/06/22 1418    Lab Status: Final result Specimen: Swab from Nasopharynx Updated: 05/06/22 1445     COVID19 Not Detected     Influenza A PCR Not Detected     Influenza B PCR Not Detected    Narrative:      Fact sheet for providers: https://www.fda.gov/media/671394/download    Fact sheet for patients: https://www.fda.gov/media/669840/download    Test performed by PCR.          EEG    Result Date: 5/9/2022  Reason for referral: 51 y.o.male with syncope Technical Summary:  A 19 channel digital EEG was performed using the international 10-20 placement system, including eye leads and EKG leads. Duration: 20 minutes Findings: The awake tracing shows diffuse medium amplitude 5-10 Hz intermixed theta and alpha activity  which is present symmetrically over both hemispheres.  Occasional and brief bursts of 3-4 Hz generalized delta are seen.  Photic stimulation does not change the background.  A clear posterior rhythm is not seen.  Sleep is seen with slowing of the background and sleep spindles.  No focal features are seen.  No epileptiform activity is seen.  Hyperventilation is not performed. Video: On Technical quality: Good EKG: Regular, 70 bpm SUMMARY: Mild intermittent generalized slow No focal features or epileptiform activity are seen     Impression: Diffuse cerebral dysfunction of exceedingly mild degree, nonspecific No evidence for epilepsy is seen This report is transcribed using the Dragon dictation system.      XR Chest 1 View    Result Date: 5/9/2022  DATE OF EXAM: 5/9/2022 6:25 AM  PROCEDURE: XR CHEST 1 VW-  INDICATIONS: Respiratory failure, fibrosis; R09.02-Hypoxemia; J18.9-Pneumonia, unspecified organism; R06.02-Shortness of breath; Z76.89-Persons encountering health services in other specified circumstances  COMPARISON: 5/8/2022  TECHNIQUE: Single radiographic AP view of the chest was obtained.  FINDINGS: Stable cardiomediastinal silhouette with redemonstration of mild cardiomegaly. There is diffuse coarsened interstitial changes throughout the lungs compatible with underlying fibrosis. No definite focal consolidation or evidence of new airspace disease. No pleural effusion or pneumothorax.      Impression: No significant interval change.  This report was finalized on 5/9/2022 8:27 AM by Noe Peters MD.      XR Chest 1 View    Result Date: 5/9/2022  XR CHEST 1 VW-  Date of Exam: 5/8/2022 8:00 PM  Indication: Syncope/hypoxia; R09.02-Hypoxemia; J18.9-Pneumonia, unspecified organism; R06.02-Shortness of breath; Z76.89-Persons encountering health services in other specified circumstances.  Comparison:?05/08/2022 at 7:42 AM, 05/06/2022  Technique:?A single view of the chest was obtained.  FINDINGS:  Cardiomegaly is  stable.  There are persistent coarsened interstitial markings throughout both lungs consistent with changes of chronic lung disease.  There is no focal airspace consolidation.  No definite pneumothorax.  No evidence of pleural effusion.      Impression:   1.  No change in prominent interstitial markings throughout both lungs compatible changes of chronic interstitial lung disease and pulmonary fibrosis.  No new focal airspace consolidation.   This report was finalized on 5/9/2022 7:57 AM by Adan Griffiths MD.      XR Chest 1 View    Result Date: 5/8/2022  DATE OF EXAM: 5/8/2022 7:46 AM  PROCEDURE: XR CHEST 1 VW-  INDICATIONS: follow up pulm fibrosis, infiltrates, assess volume status; R09.02-Hypoxemia; J18.9-Pneumonia, unspecified organism; R06.02-Shortness of breath; Z76.89-Persons encountering health services in other specified circumstances  COMPARISON: 05/06/2022  TECHNIQUE: Single radiographic AP view of the chest was obtained.  FINDINGS: Heart size stable. Stable appearance of diffuse interstitial markings throughout both lungs. No focal airspace consolidation. Costophrenic angles sharp      Impression: Stable appearance of diffuse interstitial lung disease, presumably fibrosis. No acute infiltrate demonstrated currently  This report was finalized on 5/8/2022 8:13 AM by Shola Lopez.        Results for orders placed in visit on 05/26/21    Echocardiogram Scan      I have reviewed the medications:  Scheduled Meds:acetaZOLAMIDE, 500 mg, Intravenous, Once  cefTRIAXone, 1 g, Intravenous, Q24H   And  azithromycin, 500 mg, Intravenous, Q24H  budesonide-formoterol, 2 puff, Inhalation, BID - RT  divalproex, 750 mg, Oral, BID  FLUoxetine, 40 mg, Oral, QAM  heparin (porcine), 5,000 Units, Subcutaneous, Q8H  insulin detemir, 50 Units, Subcutaneous, Daily  insulin lispro, 0-14 Units, Subcutaneous, Q3H  Insulin Lispro, 5 Units, Subcutaneous, TID With Meals  ipratropium-albuterol, 3 mL, Nebulization, Q6H While Awake -  RT  lisinopril, 20 mg, Oral, Daily  methylPREDNISolone sodium succinate, 500 mg, Intravenous, Q12H  nicotine, 1 patch, Transdermal, Q24H  pantoprazole, 40 mg, Oral, Q AM  Pirfenidone, 267 mg, Oral, TID  [START ON 5/10/2022] predniSONE, 60 mg, Oral, Daily  QUEtiapine, 25 mg, Oral, BID  QUEtiapine, 300 mg, Oral, Nightly  sodium chloride, 10 mL, Intravenous, Q12H      Continuous Infusions:   PRN Meds:.ALPRAZolam  •  benzonatate  •  dextrose  •  dextrose  •  glucagon (human recombinant)  •  guaiFENesin-dextromethorphan  •  ipratropium-albuterol  •  magnesium sulfate **OR** magnesium sulfate in D5W 1g/100mL (PREMIX) **OR** magnesium sulfate  •  ondansetron  •  sodium chloride  •  sodium chloride    Assessment/Plan   Assessment & Plan     Active Hospital Problems    Diagnosis  POA   • **Acute on chronic respiratory failure with hypoxia (HCC) [J96.21]  Unknown   • Pulmonary fibrosis, presumed IPF currently on antifibrotic and 8 L nasal cannula oxygen at home [J84.10]  Unknown   • Abnormal chest x-ray, rule out pneumonia versus IPF exacerbation versus [R93.89]  Unknown   • Type 2 diabetes mellitus, without long-term current use of insulin (HCC) [E11.9]  Yes   • PVD (peripheral vascular disease) with claudication (HCC) [I73.9]  Yes   • Essential hypertension [I10]  Yes      Resolved Hospital Problems   No resolved problems to display.        Brief Hospital Course to date:  Melchor Marie is a 51 y.o. male with a history of hypertension, diabetes, pulmonary fibrosis followed by pulmonology who is presenting with increased frequency of cough and dyspnea and increased hypoxemia admitted with bilateral pneumonia    This patient's problems and plans were partially entered by my partner and updated as appropriate by me 05/09/22. Today is my first day evaluating this patient's active medical problems. I Personally reviewed chart and adjusted note to reflect daily changes in management/clinical condition      Bilateral pulmonary  "infiltrates (on cxr)  Acute on chronic (8L nc at home) hypoxic resp failure  History of pulmonary fibrosis  - He uses 8L of O2 by nasal cannula at home; currently on high flow nasal cannula with good saturations but he is becoming fatigued and his respiratory status is tenuous.  He tells me that pulmonology has discussed possible transition to ICU if he does not improve.  We discussed possible intubation and he needs to think about whether he wants this.  -pulm following: feels pulm fibrosis \"exacerbation\" but cannot rule out some component of superinfection: initiated rocephin & z-max (day #3), high dose pulse solumedrol 500mg iv bid through 5/10 a.m (then prednisone 60mg daily and slow taper over 4-6 weeks)., continue hi flow (currently weaned to 50% fio2); continue scheduled & prn duonebs; give dose diamox 5/9/22 (keep on \"dry side\"); pulm assisting in contacting [facility] transplant program to see if can get patient re-enrolled      Spells of altered cognition  Hx pseudoseizures  Chronic depakote use  Anxiety/depression  -hx of \"pseudoseizures\" per patient; is on depakote  -has had multiple episodes of unresponsiveness, not consistent w/ seizures and responds to sternal rub, and is immediately thinking clearly, most consistent w/ pseudoseizures  -Neuro following: depakote levels checked & ok; EEG no epileptiform activity (5/9/22); mri ordered & pending; continue depakote & fluoextine  -continue xanax 0.5mg q8h prn anxiety (hold for sedation or resp distress)  -med rec says on xanax 1mg bid, but PDMP doesn't have it written    Type 2 diabetes, w/ marked hyperglycemic worsened by pulse steroids  -on pulse solumedrol through 5/10/22 morning, then changing to prednisone 60 w/ prolonged taper, so anticipate continued increased insulin requirements  -levemir 30 units sq x 1 this afternoon, then every evening starting 5/10/22  -continue levemir 50 units sq qam  -increase humalog to 7 unit sq tid " meals  -continue q3h fsbs w/ medium sliding scale humalog  -tomorrow plan to transition to ac/hs fsbs is more reasonably controlled fsbs       Hypertension  - Continue lisinopril.     Am labs: cbc,bmp,mag    Called wife beatriz) [phone] to update on 5/8/22, she appreciated the call    DVT prophylaxis:  Medical DVT prophylaxis orders are present.       AM-PAC 6 Clicks Score (PT): 14 (05/08/22 0800)    Disposition: I expect the patient to be discharged TBD    CODE STATUS:   Code Status and Medical Interventions:   Ordered at: 05/06/22 1539     Level Of Support Discussed With:    Patient     Code Status (Patient has no pulse and is not breathing):    CPR (Attempt to Resuscitate)     Medical Interventions (Patient has pulse or is breathing):    Full Support       Nathan Miranda MD  05/09/22

## 2022-05-09 NOTE — CONSULTS
" visited with patient per RN referral. Patient struggling to breathe, reports he might be intubated. Mr. Marie discussed how his life is impacted by his chronic illnesses, but especially his pulmonary illness. At one point, he indicated that he was \"ready to give up and die. I can't take no more.\" However, patient has strong relationships with his mother and wife in particular and wants to help out his mother who has her own health issues. He is also grieving the loss of his father. Patient asked writer to return: \"I need someone to talk to.\" Spiritual Care will follow patient.  "

## 2022-05-09 NOTE — PROGRESS NOTES
Pulmonary/Critical Care Follow-up     LOS: 3 days   Patient Care Team:  Sylvia Parker PA as PCP - General (Physician Assistant)  Hang Wagoner MD as Consulting Physician (Cardiology)     Chief Complaint   Patient presents with   • Shortness of Breath     Subjective      Initial history (from 5/7/22):    This is an unfortunate 51-year-old gentleman with a past medical history significant for prior cigarette abuse as well as a diagnosis of probable idiopathic pulmonary fibrosis since at least 2017 currently on pirfenidone antifibrotic therapy who also is typically on 7 to 8 L of nasal cannula oxygen at home.  At baseline he is very short of breath and limited in his activities.  By records, he previously had been under evaluation at  for lung transplant and while details of that are not completely forthcoming, apparently there was a concern about possible compliance and some psychiatric concerns.  He follows with Dr. Padilla, pulmonology, and [place].       He apparently presented to the emergency department yesterday in [place], [place], with complaints of approximately 2 days of worsened shortness of breath.  He denied any fevers but states he did have perhaps some chills the other day.  He has not been bringing up much in the way of secretions however when he does bring it up it is yellow in coloration.  He states he typically does not have sputum expectoration with a cough.  He denied hemoptysis.  He has denied rashes.  His saturations have gotten as low as the 50s per his report and he has required higher levels of oxygen support since his arrival here.  At that emergency department he underwent a CT scan of the chest which I believe was with contrast.  Apparently there were no pulmonary emboli.  The report suggested diffuse bilateral groundglass disease.  Swabs for flu and COVID were negative.  Unfortunately I do not have these films to review myself.  Chest x-ray at our facility does  "show bilateral interstitial changes consistent with his known fibrosis which appear to be grossly unchanged compared to a 2021 film.    (End of copied text).    Patient is being treated with 3 days of 500 mg IV Solu-Medrol.  He has received 2 doses of this so far.    Interval History:     Patient does report some ongoing shortness of breath.  He is on high flow nasal cannula at 65% FiO2 and I turned him down to 50% while I was in the room with ongoing saturations of 92 to 94%.  I would like to titrate his oxygen to 90 to 92%.  No fevers or chills.  His mother and brother at the bedside.  Patient reports he would be interested in transplant at this point.  I was able to review prior transplant coordinator notes from back in 2017 and 2018 which indicate that he did not want to be seen again because he was not being listed and did not see any point in following up with them.  The patient reports he is now interested in this.  He denies alcohol use.  He does admit to a history of \"conversion disorder\" with what sounds like pseudoseizures.  He reports this has not been an issue recently.  He does however report a few episodes of brief \"blacking out\" while coughing.  He does report some pleuritic type chest pain with his cough.    History taken from: Patient.    PMH/FH/Social History were reviewed and updated appropriately in the electronic medical record.     Review of Systems:    Review of 14 systems was completed with positives and pertinent negatives noted in the subjective section.  All other systems reviewed and are negative.       Objective     Vital Signs  Temp:  [97.2 °F (36.2 °C)-98 °F (36.7 °C)] 97.2 °F (36.2 °C)  Heart Rate:  [53-99] 88  Resp:  [16-24] 16  BP: ()/(61-79) 119/76  05/08 0701 - 05/09 0700  In: 1005 [P.O.:1005]  Out: 3300 [Urine:3300]  Body mass index is 28.88 kg/m².     IV drips:      Physical Exam:     Constitutional:   Alert, cooperative, in no acute distress   Head:   Normocephalic, " without obvious abnormality, atraumatic   Eyes:           Lids and lashes normal, conjunctivae and sclerae normal.  No icterus, no pallor, corneas clear, PER   ENMT:  Ears appear intact with no abnormalities noted     No oral lesions, no thrush, oral mucosa moist   Neck:  No adenopathy, supple, trachea midline, no thyromegaly, no JVD   Lungs/Resp:    Normal effort, symmetric chest rise, no crepitus, moderate to severe bilateral posterior rales, worse at the bases, no chest wall tenderness                Heart/CV:   Regular rhythm and normal rate, normal S1 and S2, no            murmur   Abdomen/GI:    Normal bowel sounds, no masses, no organomegaly, soft,        nontender, nondistended   :    Deferred   Extremities/MSK:   Clubbing present.  No edema.  Normal tone.    No deformities.    Pulses:  Pulses palpable and equal bilaterally   Skin:  No bleeding, bruising or rash   Heme/Lymph:  No cervical or supraclavicular adenopathy.   Neurologic:    Psychiatric:    Moves all extremities with no obvious focal motor deficit.  Cranial nerves 2 - 12 grossly intact  Oriented x 3, normal affect.    The above physical exam findings were reviewed and reflect my exam findings as of today's exam.   Electronically signed by:  Magdi Wright MD  05/09/22  13:35 EDT      Results Review:     I reviewed the patient's new clinical results.   Results from last 7 days   Lab Units 05/09/22  0706 05/08/22  1958 05/08/22  0611 05/07/22  0725 05/06/22  1358   SODIUM mmol/L 138 134* 133*   < > 131*   POTASSIUM mmol/L 4.1 4.0 4.7   < > 4.5   CHLORIDE mmol/L 98 94* 95*   < > 90*   CO2 mmol/L 33.0* 29.0 28.0   < > 26.0   BUN mg/dL 18 14 14   < > 15   CREATININE mg/dL 0.57* 0.71* 0.56*   < > 0.84   CALCIUM mg/dL 9.1 9.2 9.0   < > 9.8   BILIRUBIN mg/dL  --   --   --   --  0.4   ALK PHOS U/L  --   --   --   --  144*   ALT (SGPT) U/L  --   --   --   --  29   AST (SGOT) U/L  --   --   --   --  32   GLUCOSE mg/dL 173* 319* 426*   < > 351*    < > =  values in this interval not displayed.     Results from last 7 days   Lab Units 05/09/22  0706 05/08/22  1958 05/08/22  0611   WBC 10*3/mm3 12.26* 11.18* 6.39   HEMOGLOBIN g/dL 14.7 14.6 15.2   HEMATOCRIT % 41.9 40.4 42.5   PLATELETS 10*3/mm3 209 250 222     Results from last 7 days   Lab Units 05/08/22 2002   PH, ARTERIAL pH units 7.453*   PO2 ART mm Hg 53.8*   PCO2, ARTERIAL mm Hg 45.3*   HCO3 ART mmol/L 31.7*     Results from last 7 days   Lab Units 05/09/22  0706 05/08/22  0611 05/07/22  0725   MAGNESIUM mg/dL 2.2 2.0 2.0       I reviewed the patient's new imaging including images and reports.    Medication Review:   cefTRIAXone, 1 g, Intravenous, Q24H   And  azithromycin, 500 mg, Intravenous, Q24H  budesonide-formoterol, 2 puff, Inhalation, BID - RT  divalproex, 750 mg, Oral, BID  FLUoxetine, 40 mg, Oral, QAM  heparin (porcine), 5,000 Units, Subcutaneous, Q8H  insulin detemir, 50 Units, Subcutaneous, Daily  insulin lispro, 0-14 Units, Subcutaneous, Q3H  Insulin Lispro, 5 Units, Subcutaneous, TID With Meals  ipratropium-albuterol, 3 mL, Nebulization, Q6H While Awake - RT  lisinopril, 20 mg, Oral, Daily  methylPREDNISolone sodium succinate, 500 mg, Intravenous, Q12H  nicotine, 1 patch, Transdermal, Q24H  pantoprazole, 40 mg, Oral, Q AM  Pirfenidone, 267 mg, Oral, TID  QUEtiapine, 25 mg, Oral, BID  QUEtiapine, 300 mg, Oral, Nightly  sodium chloride, 10 mL, Intravenous, Q12H           Assessment/Plan         Acute on chronic respiratory failure with hypoxia (HCC)    PVD (peripheral vascular disease) with claudication (HCC)    Essential hypertension    Type 2 diabetes mellitus, without long-term current use of insulin (HCC)    Pulmonary fibrosis, presumed IPF currently on antifibrotic and 8 L nasal cannula oxygen at home    Abnormal chest x-ray, rule out pneumonia versus IPF exacerbation versus    51 y.o. with what sounds like UIP/IPF followed by Dr. Padilla in [facility] from a pulmonary standpoint.  The  "patient has previously been seen at [facility] transplant clinic back in 2017 and 2018.  It appears, based on notes reviewed from the transplant coordinator in care everywhere, that the patient did not want to be followed on an ongoing basis because he did not feel that he needed a transplant at the time did not see the point in following up regularly there.    The patient is now admitted with worsening hypoxemic respiratory failure.  CT scan apparently showed no evidence of pulmonary embolism but did show some groundglass disease diffusely along with the chronic fibrotic changes.  I am trying to get copies of the images from [place].  The patient's mother may pick those up today or tomorrow and bring them to the hospital.  In the meantime, the patient is being treated with pulse dose Solu-Medrol 500 mg daily for 3 days.  I will drop him to 60 mg prednisone daily after that.    The patient is interested in transplant evaluation at this point.  He states that he would be willing to undergo surgery and do all the necessary follow-up that would be required of him.  He has not been on chronic steroids.  His BMI is 28, which I do not think will be a problem.  If he does okay this hospitalization, he can see them as an outpatient.    Patient seems to be maybe a little better based on oxygen requirements.  He is on 8 L home oxygen.    Plan:  1.  For pulmonary fibrosis: Possible acute exacerbation.  Complete pulse steroids and then decreased to 60 mg prednisone daily with taper over 4 to 6 weeks.  Trying to obtain images from recent CT scan.  Wean oxygen as tolerated.  2.  For possible pneumonia: Complete current antibiotic course.  3.  For possible volume overload, congestive heart failure: I would try to keep the patient as \"dry\" as possible.  He received Bumex 1 mg yesterday with no worsening of renal function although his serum bicarbonate has gone up a bit.  I will give him a dose of Diamox as he is a little " bit alkalotic on his ABG.  Watch labs closely.  4.  I will contact [facility] transplant program to see if we can get patient reenrolled in transplant.  I will also try to obtain additional records from his primary pulmonologist, Dr. Padilla, in [place].  5.  We will continue to follow.    Level of Risk High due to:  illness with threat to life or bodily function acute on chronic respiratory failure with high risk medication management.    I spent 45 minutes with patient today including 25 minutes in discussion/counseling/coordination of care.    Electronically signed by:    Magdi Wright MD  05/09/22  13:35 EDT      *. Please note that portions of this note were completed with [employer] - a voice recognition program.

## 2022-05-09 NOTE — NURSING NOTE
1938: Pt unresponsive to noise, multiple sternal rubs. Rapid Response called. Oxygen Sat dropped to 87% on Hi-flow. When awake pt seemed very anxious.    ABG, EKG, STAT lactic acid, CBC, CMP, and chest x-ray obtained.

## 2022-05-10 NOTE — PROGRESS NOTES
[facility]  PROGRESS NOTE    Patient Name: Melchor Marie  : 1970  MRN: [id number]    Date of Admission: 2022  Primary Care Physician: Sylvia Parker PA    Subjective   Subjective     CC:  Hypoxia, dyspnea    HPI:  Increased oxygen requirements today. No sputum. No fever. No overt pain  ROS:  Gen- No fevers, chills  CV-intermittent chest soreness  Resp-dyspnea as above  GI- No N/V/D, abd pain        Objective   Objective     Vital Signs:   Temp:  [97.1 °F (36.2 °C)-97.8 °F (36.6 °C)] 97.8 °F (36.6 °C)  Heart Rate:  [68-92] 75  Resp:  [18-24] 22  BP: ()/(60-86) 103/67  Flow (L/min):  [58-60] 60     Physical Exam:  Constitutional: appears chronically ill but nontoxic, high flow canula in place, no overt distress, mildly anxious affect, ox3  HENT: NCAT, mucous membranes moist  Respiratory: On high flow nasal cannula, poor respiratory effort, faint scattered mid insp crackles bilaterally  Cardiovascular: RRR, no murmurs, rubs, or gallops  Gastrointestinal: Positive bowel sounds, soft, nontender, nondistended  Musculoskeletal: No bilateral ankle edema  Psychiatric: Appropriate affect, cooperative, calm  Neurologic: Oriented x 3, strength symmetric in all extremities, Cranial Nerves grossly intact to confrontation, speech clear  Skin: No rashes      Results Reviewed:  LAB RESULTS:      Lab 05/10/22  0822 22  0706 22  1958 22  0611 22  2244 22  1900 22  0725 22  1742 22  1358   WBC 11.77* 12.26* 11.18* 6.39  --   --  7.04  --  11.97*   HEMOGLOBIN 14.9 14.7 14.6 15.2  --   --  14.7  --  17.3   HEMATOCRIT 43.2 41.9 40.4 42.5  --   --  44.5  --  50.7   PLATELETS 219 209 250 222  --   --  220  --  310   NEUTROS ABS  --  11.24* 10.15* 5.71  --   --  5.05  --  9.28*   IMMATURE GRANS (ABS)  --  0.04 0.05 0.02  --   --  0.02  --  0.04   LYMPHS ABS  --  0.74 0.57* 0.60*  --   --  1.42  --  1.68   MONOS ABS  --   0.23 0.40 0.05*  --   --  0.52  --  0.93*   EOS ABS  --  0.00 0.00 0.00  --   --  0.00  --  0.00   MCV 89.8 88.2 86.3 86.9  --   --  94.7  --  90.5   PROCALCITONIN  --   --   --   --   --   --   --   --  0.09   LACTATE  --   --  3.6* 1.4 2.7* 2.7*  --  1.6 2.8*         Lab 05/10/22  0822 05/09/22  0706 05/08/22 1958 05/08/22  0611 05/07/22  0725   SODIUM 138 138 134* 133* 138   POTASSIUM 4.7 4.1 4.0 4.7 4.3   CHLORIDE 102 98 94* 95* 98   CO2 27.0 33.0* 29.0 28.0 29.0   ANION GAP 9.0 7.0 11.0 10.0 11.0   BUN 19 18 14 14 15   CREATININE 0.57* 0.57* 0.71* 0.56* 0.76   EGFR 119.4 119.4 111.8 120.1 109.5   GLUCOSE 114* 173* 319* 426* 248*   CALCIUM 8.9 9.1 9.2 9.0 8.6   MAGNESIUM 2.3 2.2  --  2.0 2.0   HEMOGLOBIN A1C  --   --   --  11.70*  --          Lab 05/06/22  1358   TOTAL PROTEIN 7.8   ALBUMIN 4.00   GLOBULIN 3.8   ALT (SGPT) 29   AST (SGOT) 32   BILIRUBIN 0.4   ALK PHOS 144*         Lab 05/08/22  0611 05/06/22  1358   PROBNP 568.3 1,824.0*   TROPONIN T  --  <0.010                 Lab 05/08/22 2002 05/07/22  1900   PH, ARTERIAL 7.453* 7.431   PCO2, ARTERIAL 45.3* 45.8*   PO2 ART 53.8* 152.0*   FIO2 80 70   HCO3 ART 31.7* 30.4*   BASE EXCESS ART 6.6* 5.2*   CARBOXYHEMOGLOBIN 1.4 1.4     Brief Urine Lab Results  (Last result in the past 365 days)      Color   Clarity   Blood   Leuk Est   Nitrite   Protein   CREAT   Urine HCG        05/09/22 1017 Yellow   Clear   Negative   Negative   Negative   Negative                 Microbiology Results Abnormal     Procedure Component Value - Date/Time    Blood Culture - Blood, Arm, Left [[id number]]  (Normal) Collected: 05/06/22 1500    Lab Status: Preliminary result Specimen: Blood from Arm, Left Updated: 05/10/22 1547     Blood Culture No growth at 4 days    Blood Culture - Blood, Arm, Right [[id number]]  (Normal) Collected: 05/06/22 1520    Lab Status: Preliminary result Specimen: Blood from Arm, Right Updated: 05/10/22 1547     Blood Culture No growth at 4 days    S. Pneumo  Ag Urine or CSF - Urine, Urine, Clean Catch [[id number]]  (Normal) Collected: 05/06/22 1822    Lab Status: Final result Specimen: Urine, Clean Catch Updated: 05/07/22 0639     Strep Pneumo Ag Negative    Legionella Antigen, Urine - Urine, Urine, Clean Catch [[id number]]  (Normal) Collected: 05/06/22 1822    Lab Status: Final result Specimen: Urine, Clean Catch Updated: 05/07/22 0639     LEGIONELLA ANTIGEN, URINE Negative    COVID PRE-OP / PRE-PROCEDURE SCREENING ORDER (NO ISOLATION) - Swab, Nasopharynx [[id number]]  (Normal) Collected: 05/06/22 1418    Lab Status: Final result Specimen: Swab from Nasopharynx Updated: 05/06/22 1445    Narrative:      The following orders were created for panel order COVID PRE-OP / PRE-PROCEDURE SCREENING ORDER (NO ISOLATION) - Swab, Nasopharynx.  Procedure                               Abnormality         Status                     ---------                               -----------         ------                     COVID-19 and FLU A/B PCR...[[id number]]  Normal              Final result                 Please view results for these tests on the individual orders.    COVID-19 and FLU A/B PCR - Swab, Nasopharynx [[id number]]  (Normal) Collected: 05/06/22 1418    Lab Status: Final result Specimen: Swab from Nasopharynx Updated: 05/06/22 1445     COVID19 Not Detected     Influenza A PCR Not Detected     Influenza B PCR Not Detected    Narrative:      Fact sheet for providers: https://www.fda.gov/media/492534/download    Fact sheet for patients: https://www.fda.gov/media/710701/download    Test performed by PCR.          EEG    Result Date: 5/9/2022  Reason for referral: 51 y.o.male with syncope Technical Summary:  A 19 channel digital EEG was performed using the international 10-20 placement system, including eye leads and EKG leads. Duration: 20 minutes Findings: The awake tracing shows diffuse medium amplitude 5-10 Hz intermixed theta and alpha activity which is present symmetrically  over both hemispheres.  Occasional and brief bursts of 3-4 Hz generalized delta are seen.  Photic stimulation does not change the background.  A clear posterior rhythm is not seen.  Sleep is seen with slowing of the background and sleep spindles.  No focal features are seen.  No epileptiform activity is seen.  Hyperventilation is not performed. Video: On Technical quality: Good EKG: Regular, 70 bpm SUMMARY: Mild intermittent generalized slow No focal features or epileptiform activity are seen     Impression: Diffuse cerebral dysfunction of exceedingly mild degree, nonspecific No evidence for epilepsy is seen This report is transcribed using the Dragon dictation system.      XR Chest 1 View    Result Date: 5/9/2022  DATE OF EXAM: 5/9/2022 6:25 AM  PROCEDURE: XR CHEST 1 VW-  INDICATIONS: Respiratory failure, fibrosis; R09.02-Hypoxemia; J18.9-Pneumonia, unspecified organism; R06.02-Shortness of breath; Z76.89-Persons encountering health services in other specified circumstances  COMPARISON: 5/8/2022  TECHNIQUE: Single radiographic AP view of the chest was obtained.  FINDINGS: Stable cardiomediastinal silhouette with redemonstration of mild cardiomegaly. There is diffuse coarsened interstitial changes throughout the lungs compatible with underlying fibrosis. No definite focal consolidation or evidence of new airspace disease. No pleural effusion or pneumothorax.      Impression: No significant interval change.  This report was finalized on 5/9/2022 8:27 AM by Noe Peters MD.      XR Chest 1 View    Result Date: 5/9/2022  XR CHEST 1 VW-  Date of Exam: 5/8/2022 8:00 PM  Indication: Syncope/hypoxia; R09.02-Hypoxemia; J18.9-Pneumonia, unspecified organism; R06.02-Shortness of breath; Z76.89-Persons encountering health services in other specified circumstances.  Comparison:?05/08/2022 at 7:42 AM, 05/06/2022  Technique:?A single view of the chest was obtained.  FINDINGS:  Cardiomegaly is stable.  There are persistent  coarsened interstitial markings throughout both lungs consistent with changes of chronic lung disease.  There is no focal airspace consolidation.  No definite pneumothorax.  No evidence of pleural effusion.      Impression:   1.  No change in prominent interstitial markings throughout both lungs compatible changes of chronic interstitial lung disease and pulmonary fibrosis.  No new focal airspace consolidation.   This report was finalized on 5/9/2022 7:57 AM by Adan Griffiths MD.        Results for orders placed in visit on 05/26/21    Echocardiogram Scan      I have reviewed the medications:  Scheduled Meds:azithromycin, 250 mg, Intravenous, Q24H   And  cefTRIAXone, 1 g, Intravenous, Q24H  budesonide-formoterol, 2 puff, Inhalation, BID - RT  bumetanide, 1 mg, Intravenous, Once  divalproex, 750 mg, Oral, BID  FLUoxetine, 40 mg, Oral, QAM  heparin (porcine), 5,000 Units, Subcutaneous, Q8H  insulin detemir, 20 Units, Subcutaneous, Nightly  insulin detemir, 50 Units, Subcutaneous, Daily  insulin lispro, 0-14 Units, Subcutaneous, Q3H  Insulin Lispro, 7 Units, Subcutaneous, TID With Meals  ipratropium-albuterol, 3 mL, Nebulization, Q6H While Awake - RT  [START ON 5/11/2022] lisinopril, 5 mg, Oral, Daily  nicotine, 1 patch, Transdermal, Q24H  pantoprazole, 40 mg, Oral, Q AM  Pirfenidone, 267 mg, Oral, TID  [START ON 5/11/2022] predniSONE, 60 mg, Oral, Daily With Breakfast  QUEtiapine, 25 mg, Oral, BID  QUEtiapine, 300 mg, Oral, Nightly  sodium chloride, 10 mL, Intravenous, Q12H      Continuous Infusions:   PRN Meds:.•  ALPRAZolam  •  benzonatate  •  dextrose  •  dextrose  •  glucagon (human recombinant)  •  guaiFENesin-dextromethorphan  •  ipratropium-albuterol  •  magnesium sulfate **OR** magnesium sulfate in D5W 1g/100mL (PREMIX) **OR** magnesium sulfate  •  ondansetron  •  sodium chloride  •  sodium chloride    Assessment/Plan   Assessment & Plan     Active Hospital Problems    Diagnosis  POA   • **Acute on chronic  "respiratory failure with hypoxia (HCC) [J96.21]  Unknown   • Pulmonary fibrosis, presumed IPF currently on antifibrotic and 8 L nasal cannula oxygen at home [J84.10]  Unknown   • Abnormal chest x-ray, rule out pneumonia versus IPF exacerbation versus [R93.89]  Unknown   • Type 2 diabetes mellitus, without long-term current use of insulin (HCC) [E11.9]  Yes   • PVD (peripheral vascular disease) with claudication (HCC) [I73.9]  Yes   • Essential hypertension [I10]  Yes      Resolved Hospital Problems   No resolved problems to display.        Brief Hospital Course to date:  Melchor Marie is a 51 y.o. male with a history of hypertension, diabetes, pulmonary fibrosis followed by pulmonology who is presenting with increased frequency of cough and dyspnea and increased hypoxemia admitted with bilateral pneumonia        Bilateral pulmonary infiltrates (on cxr)  Acute on chronic (8L nc at home) hypoxic resp failure  History of pulmonary fibrosis  - He uses 8L of O2 by nasal cannula at home; currently on high flow nasal cannula with good saturations but he is becoming fatigued and his respiratory status is tenuous.  He tells me that pulmonology has discussed possible transition to ICU if he does not improve.  We discussed possible intubation and he needs to think about whether he wants this.  -pulm following: feels pulm fibrosis \"exacerbation\" but cannot rule out some component of superinfection: continuerocephin & z-max (day #4), finished 3 days high dose pulse solumedrol 500mg iv bid (last dose was 5/10/22) now transitioning to prednisone 60mg dailythrough 5/10 a.m (then prednisone 60mg daily w/ slow taper over 4-6 weeks).; give extra bumex 1mg iv x 1 today (renal fxn ok); requiring 70% hi flow currently (up from 55-60% fio2 earlier); pulm assisting in contacting  medical center transplant program to see if can get patient re-enrolled. Will get cxr, probnp, am labs  -patient is full code/full support      Spells of " "altered cognition due to pseudoseizures  Chronic depakote use  Anxiety/depression  -hx of \"pseudoseizures\" per patient; is on depakote  -has had multiple episodes of unresponsiveness, not consistent w/ seizures and responds to sternal rub, and is immediately thinking clearly, most consistent w/ pseudoseizures  -Neuro following: depakote levels checked & ok; EEG no epileptiform activity (5/9/22); patient could not tolerat mri so this was canceled.continue depakote & fluoextine & seroquel (300mg nightly and 25mg bid)  -continue xanax 0.5mg q8h prn anxiety (hold for sedation or resp distress)  -med rec says on xanax 1mg bid, but PDMP doesn't have it written    Type 2 diabetes, w/ marked hyperglycemic worsened by pulse steroids  -on pulse solumedrol through 5/10/22 morning, then changing to prednisone 60 w/ prolonged taper, so anticipate continued increased insulin requirements  -continue levemir 50 units sq qam, decrease night levemir to 20 units (from 30 units) as received last dose \"pulse\" steroid this morning 5/10, transitioning to prednisone 60mg starting 5/11. Continue humalog 7 unit tid meals, sliding scale. Continue q3h for now as labile fsbs, transitioning form pulse steroids to orals       Hypertension, currently borderlie hypotension  -holding lisinopril recently (20mg ordered)  -decrease to 5mg daily , hold for sbp <110     Am labs: cbc,bmp, ,mag, probnp, cxr    Called wife (yvrose) [phone] to update on 5/8/22, she appreciated the call  I also spoke w/ family bedside on 5/9/22    DVT prophylaxis:  Medical DVT prophylaxis orders are present.       AM-PAC 6 Clicks Score (PT): 18 (05/09/22 1611)    Disposition: I expect the patient to be discharged TBD    CODE STATUS:   Code Status and Medical Interventions:   Ordered at: 05/06/22 7377     Level Of Support Discussed With:    Patient     Code Status (Patient has no pulse and is not breathing):    CPR (Attempt to Resuscitate)     Medical Interventions " (Patient has pulse or is breathing):    Full Support       Nathan Miranda MD  05/10/22

## 2022-05-10 NOTE — PROGRESS NOTES
Pulmonary/Critical Care Follow-up     LOS: 4 days   Patient Care Team:  Sylvia Parker PA as PCP - General (Physician Assistant)  Hang Wagoner MD as Consulting Physician (Cardiology)     Chief Complaint   Patient presents with   • Shortness of Breath     Subjective      Initial history (from 5/7/22):    This is an unfortunate 51-year-old gentleman with a past medical history significant for prior cigarette abuse as well as a diagnosis of probable idiopathic pulmonary fibrosis since at least 2017 currently on pirfenidone antifibrotic therapy who also is typically on 7 to 8 L of nasal cannula oxygen at home.  At baseline he is very short of breath and limited in his activities.  By records, he previously had been under evaluation at  for lung transplant and while details of that are not completely forthcoming, apparently there was a concern about possible compliance and some psychiatric concerns.  He follows with Dr. Padilla, pulmonology, and [place].       He apparently presented to the emergency department yesterday in [place], [place], with complaints of approximately 2 days of worsened shortness of breath.  He denied any fevers but states he did have perhaps some chills the other day.  He has not been bringing up much in the way of secretions however when he does bring it up it is yellow in coloration.  He states he typically does not have sputum expectoration with a cough.  He denied hemoptysis.  He has denied rashes.  His saturations have gotten as low as the 50s per his report and he has required higher levels of oxygen support since his arrival here.  At that emergency department he underwent a CT scan of the chest which I believe was with contrast.  Apparently there were no pulmonary emboli.  The report suggested diffuse bilateral groundglass disease.  Swabs for flu and COVID were negative.  Unfortunately I do not have these films to review myself.  Chest x-ray at our facility does  show bilateral interstitial changes consistent with his known fibrosis which appear to be grossly unchanged compared to a 2021 film.    (End of copied text).    Patient is being treated with 3 days of 500 mg IV Solu-Medrol.  He has received 2 doses of this so far.    Interval History:     Patient continues to report shortness of breath.  He reports he is not yet back to baseline.  No fevers or chills.  He reports some chest pain today.  Patient reports he quit smoking about a month prior to this hospitalization but has been using a nicotine patch.      History taken from: Patient.    PMH/FH/Social History were reviewed and updated appropriately in the electronic medical record.     Review of Systems:    Review of 14 systems was completed with positives and pertinent negatives noted in the subjective section.  All other systems reviewed and are negative.       Objective     Vital Signs  Temp:  [97.1 °F (36.2 °C)-97.8 °F (36.6 °C)] 97.8 °F (36.6 °C)  Heart Rate:  [68-86] 86  Resp:  [18-24] 18  BP: ()/(60-86) 111/70  05/09 0701 - 05/10 0700  In: 525 [P.O.:525]  Out: 6220 [Urine:6220]  Body mass index is 28.88 kg/m².     IV drips:      Physical Exam:     Constitutional:   Alert, cooperative, in no acute distress   Head:   Normocephalic, without obvious abnormality, atraumatic   Eyes:           Lids and lashes normal, conjunctivae and sclerae normal.  No icterus, no pallor, corneas clear, PER   ENMT:  Ears appear intact with no abnormalities noted     No oral lesions, no thrush, oral mucosa moist   Neck:  No adenopathy, supple, trachea midline, no thyromegaly, no JVD   Lungs/Resp:    Normal effort, symmetric chest rise, no crepitus, moderate to severe bilateral posterior rales, worse at the bases, no chest wall tenderness                Heart/CV:   Regular rhythm and normal rate, normal S1 and S2, no            murmur   Abdomen/GI:    Normal bowel sounds, no masses, no organomegaly, soft,        nontender,  nondistended   :    Deferred   Extremities/MSK:  Severe clubbing.  No edema.  Normal tone.    No deformities.    Pulses:  Pulses palpable and equal bilaterally   Skin:  No bleeding, bruising or rash   Heme/Lymph:  No cervical or supraclavicular adenopathy.   Neurologic:    Psychiatric:    Moves all extremities with no obvious focal motor deficit.  Cranial nerves 2 - 12 grossly intact  Oriented x 3, normal affect.    The above physical exam findings were reviewed and reflect my exam findings as of today's exam.   Electronically signed by:  Magdi Wright MD  05/10/22  18:26 EDT      Results Review:     I reviewed the patient's new clinical results.   Results from last 7 days   Lab Units 05/10/22  0822 05/09/22  0706 05/08/22  1958 05/07/22  0725 05/06/22  1358   SODIUM mmol/L 138 138 134*   < > 131*   POTASSIUM mmol/L 4.7 4.1 4.0   < > 4.5   CHLORIDE mmol/L 102 98 94*   < > 90*   CO2 mmol/L 27.0 33.0* 29.0   < > 26.0   BUN mg/dL 19 18 14   < > 15   CREATININE mg/dL 0.57* 0.57* 0.71*   < > 0.84   CALCIUM mg/dL 8.9 9.1 9.2   < > 9.8   BILIRUBIN mg/dL  --   --   --   --  0.4   ALK PHOS U/L  --   --   --   --  144*   ALT (SGPT) U/L  --   --   --   --  29   AST (SGOT) U/L  --   --   --   --  32   GLUCOSE mg/dL 114* 173* 319*   < > 351*    < > = values in this interval not displayed.     Results from last 7 days   Lab Units 05/10/22  0822 05/09/22  0706 05/08/22  1958   WBC 10*3/mm3 11.77* 12.26* 11.18*   HEMOGLOBIN g/dL 14.9 14.7 14.6   HEMATOCRIT % 43.2 41.9 40.4   PLATELETS 10*3/mm3 219 209 250     Results from last 7 days   Lab Units 05/08/22 2002   PH, ARTERIAL pH units 7.453*   PO2 ART mm Hg 53.8*   PCO2, ARTERIAL mm Hg 45.3*   HCO3 ART mmol/L 31.7*     Results from last 7 days   Lab Units 05/10/22  0822 05/09/22  0706 05/08/22  0611   MAGNESIUM mg/dL 2.3 2.2 2.0     I obtained and reviewed the patient's pathology report from 5/23/2016.  It showed pulmonary fibrosis with a pattern most consistent with usual  interstitial pneumonia pattern.  Initially there was also the suggestion of pulmonary Langerhans' cell histiocytosis, however an addendum was made but stated that the specimen was not consistent with Langerhans histiocytosis.    I reviewed the patient's new imaging including images and reports.    CT angiogram from 5/5/2022 was reviewed.  I reviewed both the images and the report.  There was severe pulmonary fibrotic changes with subpleural emphysema and groundglass disease.  Pulmonary artery appears to be somewhat enlarged consistent with pulmonary hypertension.    Medication Review:   azithromycin, 250 mg, Intravenous, Q24H   And  cefTRIAXone, 1 g, Intravenous, Q24H  budesonide-formoterol, 2 puff, Inhalation, BID - RT  divalproex, 750 mg, Oral, BID  FLUoxetine, 40 mg, Oral, QAM  heparin (porcine), 5,000 Units, Subcutaneous, Q8H  insulin detemir, 20 Units, Subcutaneous, Nightly  insulin detemir, 50 Units, Subcutaneous, Daily  insulin lispro, 0-14 Units, Subcutaneous, Q3H  Insulin Lispro, 7 Units, Subcutaneous, TID With Meals  ipratropium-albuterol, 3 mL, Nebulization, Q6H While Awake - RT  [START ON 5/11/2022] lisinopril, 5 mg, Oral, Daily  nicotine, 1 patch, Transdermal, Q24H  pantoprazole, 40 mg, Oral, Q AM  Pirfenidone, 267 mg, Oral, TID  [START ON 5/11/2022] predniSONE, 60 mg, Oral, Daily With Breakfast  QUEtiapine, 25 mg, Oral, BID  QUEtiapine, 300 mg, Oral, Nightly  sodium chloride, 10 mL, Intravenous, Q12H           Assessment & Plan         Acute on chronic respiratory failure with hypoxia (HCC)    PVD (peripheral vascular disease) with claudication (HCC)    Essential hypertension    Type 2 diabetes mellitus, without long-term current use of insulin (HCC)    Pulmonary fibrosis, presumed IPF currently on antifibrotic and 8 L nasal cannula oxygen at home    Abnormal chest x-ray, rule out pneumonia versus IPF exacerbation versus    51 y.o. with what sounds like UIP/IPF followed by Dr. Padilla in [place]  [place] from a pulmonary standpoint.  The patient has previously been seen at [facility] transplant clinic back in 2017 and 2018.  It appears, based on notes reviewed from the transplant coordinator in care everywhere, that the patient did not want to be followed on an ongoing basis because he did not feel that he needed a transplant at the time did not see the point in following up regularly there.    The patient is now admitted with worsening hypoxemic respiratory failure.  CT showed no evidence of pulmonary embolism but did show some groundglass disease diffusely along with the chronic fibrotic changes.  I obtained copies of the images from [place].  The patient was treated with pulse dose Solu-Medrol 500 mg daily for 3 days.  Currently on 60 mg prednisone daily.  I will add Bactrim DS 3 times a week for PCP prophylaxis and plan to taper over about 6 weeks.    The patient is interested in transplant evaluation at this point.  He states that he would be willing to undergo surgery and do all the necessary follow-up that would be required of him.  He reports he quit smoking about a month prior to this hospitalization but has been using a nicotine patch.      Plan:  1.  For pulmonary fibrosis: Possible acute exacerbation.  Complete pulse steroids and then decreased to 60 mg prednisone daily with taper over 6 weeks.  Bactrim DS for PCP prophylaxis while steroid dose greater or equal than 20 mg prednisone daily.  Wean oxygen as tolerated.  2.  For possible pneumonia: Complete current antibiotic course.  3.  For possible volume overload, congestive heart failure: I would try to keep the patient as \"dry\" as possible.  He received Bumex 1 mg yesterday with no worsening of renal function although his serum bicarbonate has gone up a bit.  I will give him a dose of Diamox as he is a little bit alkalotic on his ABG.  Watch labs closely.  4.  I contacted [facility] transplant program.  He previously was not " felt to be a candidate for transplant secondary to nicotine dependence.    5.  For cigarette nicotine dependence: Patient reports he quit about a month prior to hospitalization but has been using a nicotine patch.  [facility] transplant requires he be completely off of all nicotine products.  I will discontinue his nicotine patch.  Counseled on ongoing smoking cessation.  6.  We will continue to follow.    Level of Risk High due to:  illness with threat to life or bodily function acute on chronic respiratory failure with high risk medication management.    I spent 40 minutes with patient today including 22 minutes in discussion/counseling/coordination of care.  This included obtaining and reviewing of records and imaging and discussion with patient.    Electronically signed by:    Magdi Wright MD  05/10/22  18:26 EDT      *. Please note that portions of this note were completed with [employer] - a voice recognition program.

## 2022-05-11 NOTE — PLAN OF CARE
Problem: Adult Inpatient Plan of Care  Goal: Plan of Care Review  Recent Flowsheet Documentation  Taken 5/11/2022 1142 by Jorge Sidhu, OT  Progress: improving  Plan of Care Reviewed With: patient  Outcome Evaluation: Pt demo'd improved balance and strength this date, mobility and ADL completion remain limited by significantly decreased functional endurance. Reviewed modified breathing strategies and energy conservation. Will cont IPOT per POC as tolerated. Rec d/c to IP pulmonary rehab.

## 2022-05-11 NOTE — PLAN OF CARE
Goal Outcome Evaluation:   Pt remains stable with vss. On highflow o2 and transitioned to Bipap overnight r/t desaturation of oxygen. GCS 15. No distress noted overnight. Will monitor.

## 2022-05-11 NOTE — PROGRESS NOTES
Pulmonary/Critical Care Follow-up     LOS: 5 days   Patient Care Team:  Sylvia Parker PA as PCP - General (Physician Assistant)  Hang Wagoner MD as Consulting Physician (Cardiology)     Chief Complaint   Patient presents with   • Shortness of Breath     Subjective      Initial history (from 5/7/22):    This is an unfortunate 51-year-old gentleman with a past medical history significant for prior cigarette abuse as well as a diagnosis of probable idiopathic pulmonary fibrosis since at least 2017 currently on pirfenidone antifibrotic therapy who also is typically on 7 to 8 L of nasal cannula oxygen at home.  At baseline he is very short of breath and limited in his activities.  By records, he previously had been under evaluation at  for lung transplant and while details of that are not completely forthcoming, apparently there was a concern about possible compliance and some psychiatric concerns.  He follows with Dr. Padilla, pulmonology, and [place].       He apparently presented to the emergency department yesterday in [place], [place], with complaints of approximately 2 days of worsened shortness of breath.  He denied any fevers but states he did have perhaps some chills the other day.  He has not been bringing up much in the way of secretions however when he does bring it up it is yellow in coloration.  He states he typically does not have sputum expectoration with a cough.  He denied hemoptysis.  He has denied rashes.  His saturations have gotten as low as the 50s per his report and he has required higher levels of oxygen support since his arrival here.  At that emergency department he underwent a CT scan of the chest which I believe was with contrast.  Apparently there were no pulmonary emboli.  The report suggested diffuse bilateral groundglass disease.  Swabs for flu and COVID were negative.  Unfortunately I do not have these films to review myself.  Chest x-ray at our facility does  show bilateral interstitial changes consistent with his known fibrosis which appear to be grossly unchanged compared to a 2021 film.    (End of copied text).    Patient is status post 3 days of 500 mg IV Solu-Medrol.  He has received 2 doses of this so far.    Interval History:     Patient continues to report shortness of breath.  Looks like he was trialed on 8 L oxygen but put back on high flow and eventually BiPAP for some reason.  It is noted that his oxygen saturation went down to 87%.      History taken from: Patient.    PMH/FH/Social History were reviewed and updated appropriately in the electronic medical record.     Review of Systems:    Review of 14 systems was completed with positives and pertinent negatives noted in the subjective section.  All other systems reviewed and are negative.       Objective     Vital Signs  Temp:  [95.9 °F (35.5 °C)-98.3 °F (36.8 °C)] 98.3 °F (36.8 °C)  Heart Rate:  [59-86] 65  Resp:  [18-25] 18  BP: (103-118)/(67-81) 118/81  05/10 0701 - 05/11 0700  In: 755 [P.O.:755]  Out: 5525 [Urine:5525]  Body mass index is 28.88 kg/m².     IV drips:      Physical Exam:     Constitutional:   Alert, cooperative, in no acute distress   Head:   Normocephalic, without obvious abnormality, atraumatic   Eyes:           Lids and lashes normal, conjunctivae and sclerae normal.  No icterus, no pallor, corneas clear, PER   ENMT:  Ears appear intact with no abnormalities noted     No oral lesions, no thrush, oral mucosa moist   Neck:  No adenopathy, supple, trachea midline, no thyromegaly, no JVD   Lungs/Resp:    Normal effort, symmetric chest rise, no crepitus, moderate to severe bilateral posterior rales, worse at the bases, no chest wall tenderness                Heart/CV:   Regular rhythm and normal rate, normal S1 and S2, no            murmur   Abdomen/GI:    Normal bowel sounds, no masses, no organomegaly, soft,        nontender, nondistended   :    Deferred   Extremities/MSK:  Severe  clubbing.  No edema.  Normal tone.    No deformities.    Pulses:  Pulses palpable and equal bilaterally   Skin:  No bleeding, bruising or rash   Heme/Lymph:  No cervical or supraclavicular adenopathy.   Neurologic:    Psychiatric:    Moves all extremities with no obvious focal motor deficit.  Cranial nerves 2 - 12 grossly intact  Oriented x 3, normal affect.    The above physical exam findings were reviewed and reflect my exam findings as of today's exam.   Electronically signed by:  Magdi Wright MD  05/11/22  12:39 EDT      Results Review:     I reviewed the patient's new clinical results.   Results from last 7 days   Lab Units 05/11/22  0536 05/10/22  0822 05/09/22  0706 05/07/22  0725 05/06/22  1358   SODIUM mmol/L 142 138 138   < > 131*   POTASSIUM mmol/L 3.8 4.7 4.1   < > 4.5   CHLORIDE mmol/L 104 102 98   < > 90*   CO2 mmol/L 29.0 27.0 33.0*   < > 26.0   BUN mg/dL 19 19 18   < > 15   CREATININE mg/dL 0.47* 0.57* 0.57*   < > 0.84   CALCIUM mg/dL 8.4* 8.9 9.1   < > 9.8   BILIRUBIN mg/dL  --   --   --   --  0.4   ALK PHOS U/L  --   --   --   --  144*   ALT (SGPT) U/L  --   --   --   --  29   AST (SGOT) U/L  --   --   --   --  32   GLUCOSE mg/dL 124* 114* 173*   < > 351*    < > = values in this interval not displayed.     Results from last 7 days   Lab Units 05/11/22  0536 05/10/22  0822 05/09/22  0706   WBC 10*3/mm3 10.46 11.77* 12.26*   HEMOGLOBIN g/dL 14.1 14.9 14.7   HEMATOCRIT % 40.5 43.2 41.9   PLATELETS 10*3/mm3 214 219 209     Results from last 7 days   Lab Units 05/08/22 2002   PH, ARTERIAL pH units 7.453*   PO2 ART mm Hg 53.8*   PCO2, ARTERIAL mm Hg 45.3*   HCO3 ART mmol/L 31.7*     Results from last 7 days   Lab Units 05/11/22  0536 05/10/22  0822 05/09/22  0706   MAGNESIUM mg/dL 2.2 2.3 2.2     I obtained and reviewed the patient's pathology report from 5/23/2016.  It showed pulmonary fibrosis with a pattern most consistent with usual interstitial pneumonia pattern.  Initially there was also the  suggestion of pulmonary Langerhans' cell histiocytosis, however an addendum was made but stated that the specimen was not consistent with Langerhans histiocytosis.    I reviewed the patient's new imaging including images and reports.    CT angiogram from 5/5/2022 was reviewed.  I reviewed both the images and the report.  There was severe pulmonary fibrotic changes with subpleural emphysema and groundglass disease.  Pulmonary artery appears to be somewhat enlarged consistent with pulmonary hypertension.    Medication Review:   budesonide-formoterol, 2 puff, Inhalation, BID - RT  cefTRIAXone, 1 g, Intravenous, Q24H  divalproex, 750 mg, Oral, BID  FLUoxetine, 40 mg, Oral, QAM  heparin (porcine), 5,000 Units, Subcutaneous, Q8H  [START ON 5/12/2022] insulin detemir, 40 Units, Subcutaneous, Daily  insulin lispro, 0-14 Units, Subcutaneous, Q3H  Insulin Lispro, 7 Units, Subcutaneous, TID With Meals  ipratropium-albuterol, 3 mL, Nebulization, Q6H While Awake - RT  lisinopril, 5 mg, Oral, Daily  pantoprazole, 40 mg, Oral, Q AM  Pirfenidone, 267 mg, Oral, TID  predniSONE, 60 mg, Oral, Daily With Breakfast  QUEtiapine, 25 mg, Oral, BID  QUEtiapine, 300 mg, Oral, Nightly  sodium chloride, 10 mL, Intravenous, Q12H  sulfamethoxazole-trimethoprim, 1 tablet, Oral, Once per day on Mon Wed Fri           Assessment & Plan         Acute on chronic respiratory failure with hypoxia (HCC)    PVD (peripheral vascular disease) with claudication (HCC)    Essential hypertension    Type 2 diabetes mellitus, without long-term current use of insulin (HCC)    Pulmonary fibrosis, presumed IPF currently on antifibrotic and 8 L nasal cannula oxygen at home    Abnormal chest x-ray, rule out pneumonia versus IPF exacerbation versus    51 y.o. with what sounds like UIP/IPF followed by Dr. Padilla in [facility] from a pulmonary standpoint.  The patient has previously been seen at [facility] transplant clinic back in 2017 and 2018.  It  "appears, based on notes reviewed from the transplant coordinator in care everywhere, that the patient did not want to be followed on an ongoing basis because he did not feel that he needed a transplant at the time did not see the point in following up regularly there.    The patient is now admitted with worsening hypoxemic respiratory failure.  CT showed no evidence of pulmonary embolism but did show some groundglass disease diffusely along with the chronic fibrotic changes.  I obtained copies of the images from [place].  The patient was treated with pulse dose Solu-Medrol 500 mg daily for 3 days.  Currently on 60 mg prednisone daily.  I will add Bactrim DS 3 times a week for PCP prophylaxis and plan to taper over about 6 weeks.    The patient is interested in transplant evaluation at this point.  He states that he would be willing to undergo surgery and do all the necessary follow-up that would be required of him.  He reports he quit smoking about a month prior to this hospitalization but has been using a nicotine patch.  His carboxyhemoglobin on his initial ABG was within normal limits so I would tend to believe that he has been abstinent from smoking prior to admission.    I will try again to put him on a regular nasal cannula and may repeat an ABG on this.    Plan:  1.  For pulmonary fibrosis: Possible acute exacerbation.  Completed 3 days of 500 mg Solu-Medrol then decreased to 60 mg prednisone daily with taper over 6 weeks.  Bactrim DS for PCP prophylaxis while steroid dose greater or equal than 20 mg prednisone daily.  Wean oxygen as tolerated.  I may repeat an arterial blood gas and consider additional diuretics versus Diamox.  2.  For possible pneumonia: Complete current antibiotic course.  3.  For possible volume overload, congestive heart failure: I would try to keep the patient as \"dry\" as possible.  He received Bumex 1 mg yesterday with no worsening of renal function although his serum bicarbonate has " gone up a bit.  I will give him a dose of Diamox as he is a little bit alkalotic on his ABG.  Watch labs closely.  4.  I contacted [facility] transplant program.  He previously was not felt to be a candidate for transplant secondary to nicotine dependence.    5.  For cigarette nicotine dependence: Patient reports he quit about a month prior to hospitalization but has been using a nicotine patch.  Carboxyhemoglobin on initial ABG was within normal limits which supports this.   transplant requires he be completely off of all nicotine products.  I discontinue his nicotine patch.  Counseled on ongoing smoking cessation.  6.  We will continue to follow.    Level of Risk High due to:  illness with threat to life or bodily function acute on chronic respiratory failure with high risk medication management.    Discussed with Dr. Miranda.    Electronically signed by:    Magdi Wright MD  05/11/22  12:39 EDT      *. Please note that portions of this note were completed with [employer] - a voice recognition program.

## 2022-05-11 NOTE — PROGRESS NOTES
I spoke with  today who is the [facility] lung transplant coordinator at [phone], to inquire about possible transfer to their facility for consideration of transplant evaluation given his acute decline.  Given his lack of significant response to steroids and current dependence on high flow nasal cannula/BiPAP, I feel there is a low likelihood that he will survive much longer barring transplant.  The patient was previously followed by the transplant service there.  The patient has now been cigarette free for approximately 1 month although he does report he has been using nicotine patches.  He is interested in pursuing transplant evaluation.    I obtained additional records and fax them to  so that she could have a transplant service reviewed his case for possible transfer.    I also had a long discussion with the patient with his mother and brother in attendance, during which time I relayed his overall poor prognosis from a lung standpoint given his large oxygen requirement and significant decline recently.  I reviewed images with them including images from 2018 through 2022 showing his progression in lung disease with now, essentially, diffuse fibrosis and honeycombing.  I do feel that this is progression of his underlying UIP/IPF.  I discussed goals of care, given his decline, and the patient reports that he would not want to go on mechanical ventilation if there was not a reasonable certainty that he would be able to be weaned off.  I explained to him that I did not think this would be likely at all should he end up on mechanical ventilation.  He stated he would not, then, want to go on mechanical ventilation at all, and would like to be made a DO NOT RESUSCITATE/DO NOT INTUBATE in the event of cardiac arrest or progressive respiratory failure.  I will adjust the orders to reflect this.  The patient understands that this CODE STATUS would be changed should he be excepted to the transplant service and  undergo transplant.    I spent a total of 45 minutes with the patient today including 30 minutes of coordination of care.    Electronically signed by:  Magdi Wright MD  05/11/22  14:58 EDT

## 2022-05-11 NOTE — PLAN OF CARE
Goal Outcome Evaluation:   Pt axox4. Pt OOB to chair today. Pulse ox mostly 86%-88%, pt on HFNC. Pt had large bowel movement today. Call bell in reach.

## 2022-05-11 NOTE — CASE MANAGEMENT/SOCIAL WORK
Continued Stay Note  [facility]     Patient Name: Melchor Marie  MRN: [id number]  Today's Date: 5/11/2022    Admit Date: 5/6/2022     Discharge Plan     Row Name 05/11/22 1126       Plan    Plan Comments Pt remains on high flow oxygen. Pulm is looking into possible transfer to [facility] for possible lung transplant if he is appropriate. CM will continue to follow.               Discharge Codes    No documentation.               Expected Discharge Date and Time     Expected Discharge Date Expected Discharge Time    May 13, 2022             Palma Puentes RN

## 2022-05-11 NOTE — THERAPY TREATMENT NOTE
Patient Name: Melchor Marie  : 1970    MRN: [id number]                              Today's Date: 2022       Admit Date: 2022    Visit Dx:     ICD-10-CM ICD-9-CM   1. Hypoxia  R09.02 799.02   2. Multifocal pneumonia  J18.9 486   3. Shortness of breath  R06.02 786.05   4. Referred by primary care physician  Z76.89 V68.89   5. Examination for normal comparison for clinical research  Z00.6 V70.7     Patient Active Problem List   Diagnosis   • PVD (peripheral vascular disease) with claudication (HCC)   • Hyperlipidemia LDL goal <70   • Essential hypertension   • Tobacco use   • Intractable abdominal pain   • Bulimia nervosa   • Duodenitis   • Starvation ketoacidosis   • Dehydration   • Hyponatremia   • Chest pain at rest   • Type 2 diabetes mellitus, without long-term current use of insulin (HCC)   • Hypoxia   • Acute on chronic respiratory failure with hypoxia (HCC)   • Pulmonary fibrosis, presumed IPF currently on antifibrotic and 8 L nasal cannula oxygen at home   • Abnormal chest x-ray, rule out pneumonia versus IPF exacerbation versus     Past Medical History:   Diagnosis Date   • Chest pain    • Cholelithiasis    • Hyperlipidemia    • Hypertension    • Nerve damage     tooth pick injury   • Tobacco use    • Type 2 diabetes mellitus (HCC)      Past Surgical History:   Procedure Laterality Date   • CHOLECYSTECTOMY     • ENDOSCOPY N/A 2021    Procedure: ESOPHAGOGASTRODUODENOSCOPY;  Surgeon: Servando Tyler MD;  Location: [facility] ENDOSCOPY;  Service: Gastroenterology;  Laterality: N/A;   • FOOT SURGERY Right       General Information     Row Name 22 1137          OT Time and Intention    Document Type therapy note (daily note)  -CS     Mode of Treatment occupational therapy  -CS     Row Name 22 1137          General Information    Patient Profile Reviewed yes  -CS     Existing Precautions/Restrictions fall;oxygen therapy device and L/min;other (see comments)  HFNC/BiPap  -CS      Barriers to Rehab medically complex;previous functional deficit  -     Row Name 05/11/22 1137          Cognition    Orientation Status (Cognition) oriented x 4  -     Row Name 05/11/22 1137          Safety Issues, Functional Mobility    Impairments Affecting Function (Mobility) endurance/activity tolerance;shortness of breath;strength;pain  -           User Key  (r) = Recorded By, (t) = Taken By, (c) = Cosigned By    Initials Name Provider Type     Jorge Sidhu, OT Occupational Therapist                 Mobility/ADL's     Row Name 05/11/22 1138          Bed Mobility    Bed Mobility supine-sit;scooting/bridging  -     Scooting/Bridging [place] (Bed Mobility) modified independence  -     Supine-Sit [place] (Bed Mobility) modified independence  -     Assistive Device (Bed Mobility) bed rails;head of bed elevated  -     Row Name 05/11/22 1138          Transfers    Transfers bed-chair transfer  -     Bed-Chair [place] (Transfers) contact guard  -     Assistive Device (Bed-Chair Transfers) walker, front-wheeled  -     Row Name 05/11/22 1138          Bed-Chair Transfer    Comment, (Bed-Chair Transfer) cues for sequencing safe hand placement w/ RW use  -     Row Name 05/11/22 1138          Functional Mobility    Functional Mobility- Comment CGA for in room around foot of bed to recliner  -     Row Name 05/11/22 1138          Activities of Daily Living    BADL Assessment/Intervention lower body dressing  -     Row Name 05/11/22 1138          Lower Body Dressing Assessment/Training    [place] Level (Lower Body Dressing) doff;don;socks;standby assist  -     Position (Lower Body Dressing) edge of bed sitting  -     Row Name 05/11/22 1138          Upper Body Dressing Assessment/Training    [place] Level (Upper Body Dressing) don;pajama/robe;set up;standby assist  -CS     Position (Upper Body Dressing) edge of bed sitting  -           User Key  (r) = Recorded By, (t)  = Taken By, (c) = Cosigned By    Initials Name Provider Type    Jorge Clark OT Occupational Therapist               Obj/Interventions     [facility] Name 05/11/22 1140          Shoulder (Therapeutic Exercise)    Shoulder (Therapeutic Exercise) AROM (active range of motion)  -     Shoulder AROM (Therapeutic Exercise) bilateral;flexion;extension;2 sets;10 repetitions  -     Row Name 05/11/22 1140          Elbow/Forearm (Therapeutic Exercise)    Elbow/Forearm (Therapeutic Exercise) AROM (active range of motion)  -     Elbow/Forearm AROM (Therapeutic Exercise) bilateral;flexion;extension;2 sets;10 repetitions  -[facility] Name 05/11/22 1140          Motor Skills    Motor Skills functional endurance  -     Functional Endurance Pt remained on Bipap this date for in-room activities, reviewed PLB, reports lung pain  -[facility] Name 05/11/22 1140          Balance    Balance Assessment sitting static balance;sitting dynamic balance;standing static balance;standing dynamic balance  -     Static Sitting Balance independent  -     Dynamic Sitting Balance supervision  -     Position, Sitting Balance unsupported;sitting edge of bed  -     Static Standing Balance contact guard;verbal cues  -     Dynamic Standing Balance contact guard;verbal cues  -     Position/Device Used, Standing Balance supported  -     Balance Interventions sitting;sit to stand;occupation based/functional task  -           User Key  (r) = Recorded By, (t) = Taken By, (c) = Cosigned By    Initials Name Provider Type     Jorge Sidhu OT Occupational Therapist               Goals/Plan    No documentation.                Clinical Impression     [facility] Name 05/11/22 1142          Pain Assessment    Additional Documentation Pain Scale: FACES Pre/Post-Treatment (Group)  -CS     Row Name 05/11/22 1142          Pain Scale: FACES Pre/Post-Treatment    Pain: FACES Scale, Pretreatment 4-->hurts little more  -     Posttreatment Pain Rating  4-->hurts little more  -CS     Pain Location - Side/Orientation Bilateral  -CS     Pain Location - pleuritic  -CS     Row Name 05/11/22 1142          Plan of Care Review    Plan of Care Reviewed With patient  -CS     Progress improving  -CS     Outcome Evaluation Pt demo'd improved balance and strength this date, mobility and ADL completion remain limited by significantly decreased functional endurance. Reviewed modified breathing strategies and energy conservation. Will cont IPOT per POC as tolerated. Rec d/c to IP pulmonary rehab.  -CS     Row Name 05/11/22 1142          Therapy Plan Review/Discharge Plan (OT)    Anticipated Discharge Disposition (OT) inpatient rehabilitation facility;other (see comments)  pulmonary rehab  -CS     Row Name 05/11/22 1142          Vital Signs    Pre SpO2 (%) 92  -CS     O2 Delivery Pre Treatment supplemental O2  -CS     Intra SpO2 (%) 89  -CS     O2 Delivery Intra Treatment supplemental O2  -CS     Post SpO2 (%) 90  -CS     O2 Delivery Post Treatment supplemental O2  -CS     Pre Patient Position Supine  -CS     Intra Patient Position Standing  -CS     Post Patient Position Sitting  -CS     Row Name 05/11/22 1142          Positioning and Restraints    Pre-Treatment Position in bed  -CS     Post Treatment Position chair  -CS     In Chair notified nsg;reclined;sitting;call light within reach;encouraged to call for assist;exit alarm on;RUE elevated;LUE elevated;waffle cushion;legs elevated;heels elevated  -CS           User Key  (r) = Recorded By, (t) = Taken By, (c) = Cosigned By    Initials Name Provider Type    CS Jorge Sidhu, OT Occupational Therapist               Outcome Measures     Row Name 05/11/22 1146          How much help from another is currently needed...    Putting on and taking off regular lower body clothing? 3  -CS     Bathing (including washing, rinsing, and drying) 3  -CS     Toileting (which includes using toilet bed pan or urinal) 3  -CS     Putting on and  taking off regular upper body clothing 3  -CS     Taking care of personal grooming (such as brushing teeth) 3  -CS     Eating meals 4  -CS     AM-PAC 6 Clicks Score (OT) 19  -CS     Row Name 05/11/22 1146          Functional Assessment    Outcome Measure Options AM-PAC 6 Clicks Daily Activity (OT)  -CS           User Key  (r) = Recorded By, (t) = Taken By, (c) = Cosigned By    Initials Name Provider Type    Jorge Clark OT Occupational Therapist                Occupational Therapy Education                 Title: PT OT SLP Therapies (Done)     Topic: Occupational Therapy (Done)     Point: ADL training (Done)     Description:   Instruct learner(s) on proper safety adaptation and remediation techniques during self care or transfers.   Instruct in proper use of assistive devices.              Learning Progress Summary           Patient Acceptance, E,D, DU by CS at 5/11/2022 1146    Acceptance, E,D, VU,NR by KYLE at 5/7/2022 1050    Comment: reason for consult, noted deficits, PLB, lending library information, recommended AD                   Point: Home exercise program (Done)     Description:   Instruct learner(s) on appropriate technique for monitoring, assisting and/or progressing therapeutic exercises/activities.              Learning Progress Summary           Patient Acceptance, E,D, DU by CS at 5/11/2022 1146                   Point: Precautions (Done)     Description:   Instruct learner(s) on prescribed precautions during self-care and functional transfers.              Learning Progress Summary           Patient Acceptance, E,D, DU by CS at 5/11/2022 1146    Acceptance, E,D, VU,NR by KYLE at 5/7/2022 1050    Comment: reason for consult, noted deficits, PLB, lending library information, recommended AD                   Point: Body mechanics (Done)     Description:   Instruct learner(s) on proper positioning and spine alignment during self-care, functional mobility activities and/or exercises.               Learning Progress Summary           Patient Acceptance, E,D, DU by LESLI at 5/11/2022 1146    Acceptance, E,D, VU,NR by KYLE at 5/7/2022 1050    Comment: reason for consult, noted deficits, PLB, lending library information, recommended AD                               User Key     Initials Effective Dates Name Provider Type Discipline     06/16/21 -  Lashonda Reina OT Occupational Therapist OT    LESLI 06/16/21 -  Jorge Sidhu OT Occupational Therapist OT              OT Recommendation and Plan     Plan of Care Review  Plan of Care Reviewed With: patient  Progress: improving  Outcome Evaluation: Pt demo'd improved balance and strength this date, mobility and ADL completion remain limited by significantly decreased functional endurance. Reviewed modified breathing strategies and energy conservation. Will cont IPOT per POC as tolerated. Rec d/c to IP pulmonary rehab.     Time Calculation:    Time Calculation- OT     Row Name 05/11/22 1146             Time Calculation- OT    OT Start Time 1111  -CS      OT Received On 05/11/22  -CS      OT Goal Re-Cert Due Date 05/17/22  -CS              Timed Charges    15086 - OT Therapeutic Activity Minutes 10  -CS      41507 - OT Self Care/Mgmt Minutes 4  -CS              Total Minutes    Timed Charges Total Minutes 14  -CS       Total Minutes 14  -CS            User Key  (r) = Recorded By, (t) = Taken By, (c) = Cosigned By    Initials Name Provider Type     Jorge Sidhu OT Occupational Therapist              Therapy Charges for Today     Code Description Service Date Service Provider Modifiers Qty    [id number] HC OT THERAPEUTIC ACT EA 15 MIN 5/11/2022 Jorge Sidhu OT GO 1               Jorge Sidhu OT  5/11/2022

## 2022-05-11 NOTE — PROGRESS NOTES
[facility] Medicine Services  PROGRESS NOTE    Patient Name: Melchor Marie  : 1970  MRN: [id number]    Date of Admission: 2022  Primary Care Physician: Sylvia Parker PA    Subjective   Subjective     CC:  Hypoxia, dyspnea    HPI:  Anxiety slightly worse. Oxygenation slightly worse today. No chest pain. No fever or sputum.  ROS:  Gen- No fevers, chills  CV-intermittent chest soreness stable  Resp-dyspnea as above  GI- No N/V/D, abd pain        Objective   Objective     Vital Signs:   Temp:  [95.9 °F (35.5 °C)-98.6 °F (37 °C)] 98.6 °F (37 °C)  Heart Rate:  [59-86] 77  Resp:  [16-25] 16  BP: (103-127)/(67-81) 127/80  Flow (L/min):  [8-60] 60     Physical Exam:  Constitutional: appears chronically ill but nontoxic, high flow canula in place, no overt distress, but understandably anxious, ox3  HENT: NCAT, mucous membranes moist  Respiratory: On high flow nasal cannula, poor respiratory effort, faint scattered mid insp crackles bilaterally  Cardiovascular: RRR, no murmurs, rubs, or gallops  Gastrointestinal: Positive bowel sounds, soft, nontender, nondistended  Musculoskeletal: No bilateral ankle edema  Psychiatric: mildly anxious  Neurologic: Oriented x 3, strength symmetric in all extremities, Cranial Nerves grossly intact to confrontation, speech clear  Skin: No rashes      Results Reviewed:  LAB RESULTS:      Lab 22  0536 05/10/22  0822 22  0706 22  1958 22  0611 22  2244 22  1900 22  0725 22  1742 22  1358   WBC 10.46 11.77* 12.26* 11.18* 6.39  --   --  7.04  --  11.97*   HEMOGLOBIN 14.1 14.9 14.7 14.6 15.2  --   --  14.7  --  17.3   HEMATOCRIT 40.5 43.2 41.9 40.4 42.5  --   --  44.5  --  50.7   PLATELETS 214 219 209 250 222  --   --  220  --  310   NEUTROS ABS  --   --  11.24* 10.15* 5.71  --   --  5.05  --  9.28*   IMMATURE GRANS (ABS)  --   --  0.04 0.05 0.02  --   --  0.02  --  0.04   LYMPHS ABS  --   --  0.74  0.57* 0.60*  --   --  1.42  --  1.68   MONOS ABS  --   --  0.23 0.40 0.05*  --   --  0.52  --  0.93*   EOS ABS  --   --  0.00 0.00 0.00  --   --  0.00  --  0.00   MCV 89.6 89.8 88.2 86.3 86.9  --   --  94.7  --  90.5   PROCALCITONIN  --   --   --   --   --   --   --   --   --  0.09   LACTATE  --   --   --  3.6* 1.4 2.7* 2.7*  --  1.6 2.8*         Lab 05/11/22  0536 05/10/22  0822 05/09/22  0706 05/08/22 1958 05/08/22  0611 05/07/22  0725   SODIUM 142 138 138 134* 133* 138   POTASSIUM 3.8 4.7 4.1 4.0 4.7 4.3   CHLORIDE 104 102 98 94* 95* 98   CO2 29.0 27.0 33.0* 29.0 28.0 29.0   ANION GAP 9.0 9.0 7.0 11.0 10.0 11.0   BUN 19 19 18 14 14 15   CREATININE 0.47* 0.57* 0.57* 0.71* 0.56* 0.76   EGFR 126.6 119.4 119.4 111.8 120.1 109.5   GLUCOSE 124* 114* 173* 319* 426* 248*   CALCIUM 8.4* 8.9 9.1 9.2 9.0 8.6   MAGNESIUM 2.2 2.3 2.2  --  2.0 2.0   HEMOGLOBIN A1C  --   --   --   --  11.70*  --          Lab 05/06/22  1358   TOTAL PROTEIN 7.8   ALBUMIN 4.00   GLOBULIN 3.8   ALT (SGPT) 29   AST (SGOT) 32   BILIRUBIN 0.4   ALK PHOS 144*         Lab 05/11/22  0536 05/08/22  0611 05/06/22  1358   PROBNP 367.6 568.3 1,824.0*   TROPONIN T  --   --  <0.010                 Lab 05/11/22  1356 05/08/22 2002 05/07/22  1900   PH, ARTERIAL 7.420 7.453* 7.431   PCO2, ARTERIAL 41.9 45.3* 45.8*   PO2 ART 79.8* 53.8* 152.0*   FIO2 83 80 70   HCO3 ART 27.2* 31.7* 30.4*   BASE EXCESS ART 2.3* 6.6* 5.2*   CARBOXYHEMOGLOBIN 1.1 1.4 1.4     Brief Urine Lab Results  (Last result in the past 365 days)      Color   Clarity   Blood   Leuk Est   Nitrite   Protein   CREAT   Urine HCG        05/09/22 1017 Yellow   Clear   Negative   Negative   Negative   Negative                 Microbiology Results Abnormal     Procedure Component Value - Date/Time    Blood Culture - Blood, Arm, Left [[id number]]  (Normal) Collected: 05/06/22 1500    Lab Status: Preliminary result Specimen: Blood from Arm, Left Updated: 05/10/22 1547     Blood Culture No growth at 4 days     Blood Culture - Blood, Arm, Right [[id number]]  (Normal) Collected: 05/06/22 1520    Lab Status: Preliminary result Specimen: Blood from Arm, Right Updated: 05/10/22 1547     Blood Culture No growth at 4 days    S. Pneumo Ag Urine or CSF - Urine, Urine, Clean Catch [[id number]]  (Normal) Collected: 05/06/22 1822    Lab Status: Final result Specimen: Urine, Clean Catch Updated: 05/07/22 0639     Strep Pneumo Ag Negative    Legionella Antigen, Urine - Urine, Urine, Clean Catch [[id number]]  (Normal) Collected: 05/06/22 1822    Lab Status: Final result Specimen: Urine, Clean Catch Updated: 05/07/22 0639     LEGIONELLA ANTIGEN, URINE Negative    COVID PRE-OP / PRE-PROCEDURE SCREENING ORDER (NO ISOLATION) - Swab, Nasopharynx [[id number]]  (Normal) Collected: 05/06/22 1418    Lab Status: Final result Specimen: Swab from Nasopharynx Updated: 05/06/22 1445    Narrative:      The following orders were created for panel order COVID PRE-OP / PRE-PROCEDURE SCREENING ORDER (NO ISOLATION) - Swab, Nasopharynx.  Procedure                               Abnormality         Status                     ---------                               -----------         ------                     COVID-19 and FLU A/B PCR...[[id number]]  Normal              Final result                 Please view results for these tests on the individual orders.    COVID-19 and FLU A/B PCR - Swab, Nasopharynx [[id number]]  (Normal) Collected: 05/06/22 1418    Lab Status: Final result Specimen: Swab from Nasopharynx Updated: 05/06/22 1445     COVID19 Not Detected     Influenza A PCR Not Detected     Influenza B PCR Not Detected    Narrative:      Fact sheet for providers: https://www.fda.gov/media/575333/download    Fact sheet for patients: https://www.fda.gov/media/905668/download    Test performed by PCR.          XR Chest 1 View    Result Date: 5/11/2022  DATE OF EXAM: 5/11/2022 6:27 AM  PROCEDURE: XR CHEST 1 VW-  INDICATIONS: follow up pulm fibrosis,  infiltrates, hypoxia. also diuresing; R09.02-Hypoxemia; J18.9-Pneumonia, unspecified organism; R06.02-Shortness of breath; Z76.89-Persons encountering health services in other specified circumstances  COMPARISON: 05/09/2022  TECHNIQUE: Single radiographic AP view of the chest was obtained.  FINDINGS: Heart size stable. No change in appearance of diffuse interstitial lung disease. No new pulmonary abnormality. Costophrenic angles sharp      Impression: Stable diffuse interstitial lung disease compatible with fibrosis  This report was finalized on 5/11/2022 8:43 AM by Shola Lopez.      CT Outside Films    Result Date: 5/11/2022  This procedure was auto-finalized with no dictation required.    CT Outside Films    Result Date: 5/11/2022  This procedure was auto-finalized with no dictation required.    CT Outside Films    Result Date: 5/11/2022  This procedure was auto-finalized with no dictation required.    CT Outside Films    Result Date: 5/11/2022  This procedure was auto-finalized with no dictation required.    CT Outside Films    Result Date: 5/11/2022  This procedure was auto-finalized with no dictation required.    CT Outside Films    Result Date: 5/11/2022  This procedure was auto-finalized with no dictation required.      Results for orders placed in visit on 05/26/21    Echocardiogram Scan      I have reviewed the medications:  Scheduled Meds:acetaZOLAMIDE, 500 mg, Intravenous, Once  budesonide-formoterol, 2 puff, Inhalation, BID - RT  cefTRIAXone, 1 g, Intravenous, Q24H  divalproex, 750 mg, Oral, BID  FLUoxetine, 40 mg, Oral, QAM  heparin (porcine), 5,000 Units, Subcutaneous, Q8H  [START ON 5/12/2022] insulin detemir, 40 Units, Subcutaneous, Daily  insulin lispro, 0-14 Units, Subcutaneous, Q3H  Insulin Lispro, 7 Units, Subcutaneous, TID With Meals  ipratropium-albuterol, 3 mL, Nebulization, Q6H While Awake - RT  lisinopril, 5 mg, Oral, Daily  pantoprazole, 40 mg, Oral, Q AM  Pirfenidone, 267 mg, Oral,  TID  predniSONE, 60 mg, Oral, Daily With Breakfast  QUEtiapine, 25 mg, Oral, BID  QUEtiapine, 300 mg, Oral, Nightly  sodium chloride, 10 mL, Intravenous, Q12H  sulfamethoxazole-trimethoprim, 1 tablet, Oral, Once per day on Mon Wed Fri      Continuous Infusions:   PRN Meds:.ALPRAZolam  •  benzonatate  •  dextrose  •  dextrose  •  glucagon (human recombinant)  •  guaiFENesin-dextromethorphan  •  ipratropium-albuterol  •  magnesium sulfate **OR** magnesium sulfate in D5W 1g/100mL (PREMIX) **OR** magnesium sulfate  •  ondansetron  •  sodium chloride  •  sodium chloride    Assessment/Plan   Assessment & Plan     Active Hospital Problems    Diagnosis  POA   • **Acute on chronic respiratory failure with hypoxia (HCC) [J96.21]  Unknown   • Pulmonary fibrosis, presumed IPF currently on antifibrotic and 8 L nasal cannula oxygen at home [J84.10]  Unknown   • Abnormal chest x-ray, rule out pneumonia versus IPF exacerbation versus [R93.89]  Unknown   • Type 2 diabetes mellitus, without long-term current use of insulin (HCC) [E11.9]  Yes   • PVD (peripheral vascular disease) with claudication (HCC) [I73.9]  Yes   • Essential hypertension [I10]  Yes      Resolved Hospital Problems   No resolved problems to display.        Brief Hospital Course to date:  Melchor Marie is a 51 y.o. male with a history of hypertension, diabetes, pulmonary fibrosis followed by pulmonology who is presenting with increased frequency of cough and dyspnea and increased hypoxemia admitted with bilateral pneumonia        Bilateral pulmonary infiltrates (on cxr)  Acute on chronic (8L nc at home) hypoxic resp failure  History of pulmonary fibrosis  - He uses 8L of O2 by nasal cannula at home; currently on high flow nasal cannula with good saturations but he is becoming fatigued and his respiratory status is tenuous.  He tells me that pulmonology has discussed possible transition to ICU if he does not improve.  We discussed possible intubation and he needs to  "think about whether he wants this.  -pulm following: feels pulm fibrosis \"exacerbation\" but cannot rule out some component of superinfection: continuerocephin & z-max (day #5), finished 3 days high dose pulse solumedrol 500mg iv bid (last dose was 5/10/22) now transitioned to prednisone 60mg daily (was initiated on 5/11) w/ plans to taper slowly as tolerates; slightly alkalotic today,   Today 5/11/22 Dr. Palumbo reviewed old ct scans with patient which shows progressive fibrotic changes now w/ essentially diffuse fibrosis and honeycombing. And he is having gradual deterioration during this hospitalization (now requiring 80% fio2 on hi flow) despite pulse steroids, antibiotics, diuresis, etc. Patient is still interested in transplant evaluation, and Dr. Wright has been in contact with  service and they are apparently reviewing patient's case to see if he would be accepted in transfer for consideration of lung transplant; however, patient and family understand that this is not a guarantee and that without transplant he is unlikely to survive this hospitalization; as such patient is no cpr/dnr/dni; and we await 's decision on whether he would be accepted in transfer If he is accepted to . If patient continues to deteriorate during this hospitalization, or is declined by [facility] transplant, then palliative/hospice will be pursued. Dr. Wright spent considerable time discussing w/ patient and his mother, and I called and discussed w/ wife via phone, all have expressed appreciation for care receiving and the courtesy of communication    Spells of altered cognition due to pseudoseizures  Chronic depakote use  Anxiety/depression  -hx of \"pseudoseizures\" per patient; is on depakote  -has had multiple episodes of unresponsiveness, not consistent w/ seizures and responds to sternal rub, and is immediately thinking clearly, most consistent w/ pseudoseizures  -Neuro following: depakote levels checked & ok; EEG no epileptiform " "activity (5/9/22); patient could not tolerat mri so this was canceled.continue depakote & fluoextine & seroquel (300mg nightly and 25mg bid)  -continue xanax 0.5mg q8h prn anxiety (hold for sedation or resp distress)    Type 2 diabetes, w/ marked hyperglycemic worsened by pulse steroids  -completed \"pulse steroids\" 5/10/22 morning, now on prednisone  -decrease levemir to 40 units sq qam (from 50 units daily) tomorrow, continue humalog 7 units tid meals, continue q3h fsbs for now as still labile sugars     Hypertension, currently borderlie hypotension  -holding lisinopril recently (20mg ordered)  -decrease to 5mg daily , hold for sbp <110     Am labs: cbc,bmp,mag    I spoke to family bedside 5/11/22    DVT prophylaxis:  Medical DVT prophylaxis orders are present.       AM-PAC 6 Clicks Score (PT): 18 (05/11/22 0800)    Disposition: I expect the patient to be discharged TBD    CODE STATUS:   Code Status and Medical Interventions:   Ordered at: 05/11/22 1456     Medical Intervention Limits:    NO intubation (DNI)     Code Status (Patient has no pulse and is not breathing):    No CPR (Do Not Attempt to Resuscitate)     Medical Interventions (Patient has pulse or is breathing):    Limited Support       Nathan Miranda MD  05/11/22              "

## 2022-05-12 NOTE — DISCHARGE SUMMARY
[facility] Medicine Services  TRANSFER SUMMARY    Patient Name: Melchor Marie  : 1970  MRN: [id number]    Date of Admission: 2022  Date of transfer to [facility]:  2022  Accepting service: Lung Transplant (Dr. Ashraf), via Dr. Gary Deng (through [facility]'s)   Length of Stay: 6  Primary Care Physician: Sylvia Parker PA    Consults     Date and Time Order Name Status Description    2022 12:33 AM Inpatient Neurology Consult General Completed     2022 12:33 AM Inpatient Pulmonology Consult Completed           Hospital Course     Presenting Problem:   Hypoxia [R09.02]    Active Hospital Problems    Diagnosis  POA   • **Acute on chronic respiratory failure with hypoxia (HCC) [J96.21]  Unknown   • Pulmonary fibrosis, presumed IPF currently on antifibrotic and 8 L nasal cannula oxygen at home [J84.10]  Unknown   • Abnormal chest x-ray, rule out pneumonia versus IPF exacerbation versus [R93.89]  Unknown   • Type 2 diabetes mellitus, without long-term current use of insulin (HCC) [E11.9]  Yes   • PVD (peripheral vascular disease) with claudication (HCC) [I73.9]  Yes   • Essential hypertension [I10]  Yes      Resolved Hospital Problems   No resolved problems to display.      ------------------final diagnoses--------------  Acute on chronic, hypoxic respiratory failure  Progressive Pulmonary Fibrosis, presumed IPF  Bilateral Infiltrates   Uncontrolled insulin dep DM (a1c 11.7 22)  Hx HTN  Anxiety/Depression  Pseudoseizures  ----------------------------------------------------    Hospital Course:  Melchor Marie is a 51 y.o. male with what sounds like UIP/IPF followed by Dr. Padilla in [place], [place] from pulmonary standpoint. Also w/ hx of insulin dep DM, htn, anxiety/depression, pseudoseizures (on xanax and depakote) who had previously been seen at [facility] Transplant clinic back in 2017 and 2018, and at that time stopped  "following up regularly at that time as he did not feel he required a lung transplant at that time. Patient has had increasing hypoxia, more recently reporting using 6-8L supplemental oxygen at home. Patient presented to [facility] on 5/5/22 and had a ct scan of the chest performed, reportedly was discharged with antibiotics but felt worse so saw PCP and was referred to [facility] ED where patient was profoundly hypoxic. The outside CT chest revealed diffuse ground glass opacities, honeycombing. Patient was admitted to hospitalist service, Pulmonary service consulted, and was initiated on empiric antibiotics, bronchodilators, and received 3 days of \"pulse\" steroids (completed 3 days of solumedrol 500mg iv bid) and was subsequently transitioned to prednisone 60mg daily on 5/11/22 w/ plans for a slow taper. Patient also completed 7 days of empiric rocephin & z-max on 5/12/22, cultures/micro was no growth, and covid 19 pcr negative, strep urine ag negative, legionella urine ag was negative. Patient has received intermittent diuresis to \"keep on the dry side\" during this stay (w/ bumex and diamox at different times). Despite these aggressive measures, patient is unable to be weaned from hi flow canula, currently ranging between 55-65% fio2 (has required 80% at times).   Dr. Wright (Pulmonary) has spoken with [facility] service numerous times during this admission regarding this patient, discussing [place] [facility] [place]. On 5/12/22 the patient was accepted to [facility]plant service, Dr. Ashraf, after discussing case with Dr. Gary Deng through [facility]'s, now awaiting on a bed.          Day of Discharge     HPI:   See progress note same day    Review of Systems  See progress note same day    Vital Signs:   Temp:  [97.6 °F (36.4 °C)-98.3 °F (36.8 °C)] 97.6 °F (36.4 °C)  Heart Rate:  [65-90] 74  Resp:  [18-30] 18  BP: " (101-121)/(65-85) 114/78     Physical Exam:  See progress note same day    Pertinent Results     Results from last 7 days   Lab Units 05/12/22  0519 05/11/22  0536 05/10/22  0822 05/09/22  0706 05/08/22  1958 05/08/22  0611 05/07/22  0725   WBC 10*3/mm3 10.89* 10.46 11.77* 12.26* 11.18* 6.39 7.04   HEMOGLOBIN g/dL 14.6 14.1 14.9 14.7 14.6 15.2 14.7   HEMATOCRIT % 42.6 40.5 43.2 41.9 40.4 42.5 44.5   PLATELETS 10*3/mm3 216 214 219 209 250 222 220   SODIUM mmol/L 138 142 138 138 134* 133* 138   POTASSIUM mmol/L 4.2 3.8 4.7 4.1 4.0 4.7 4.3   CHLORIDE mmol/L 103 104 102 98 94* 95* 98   CO2 mmol/L 26.0 29.0 27.0 33.0* 29.0 28.0 29.0   BUN mg/dL 16 19 19 18 14 14 15   CREATININE mg/dL 0.55* 0.47* 0.57* 0.57* 0.71* 0.56* 0.76   GLUCOSE mg/dL 171* 124* 114* 173* 319* 426* 248*   CALCIUM mg/dL 8.3* 8.4* 8.9 9.1 9.2 9.0 8.6     Results from last 7 days   Lab Units 05/06/22  1358   BILIRUBIN mg/dL 0.4   ALK PHOS U/L 144*   ALT (SGPT) U/L 29   AST (SGOT) U/L 32           Invalid input(s): TG, LDLCALC, LDLREALC  Results from last 7 days   Lab Units 05/08/22  0611   HEMOGLOBIN A1C % 11.70*     Brief Urine Lab Results  (Last result in the past 365 days)      Color   Clarity   Blood   Leuk Est   Nitrite   Protein   CREAT   Urine HCG        05/09/22 1017 Yellow   Clear   Negative   Negative   Negative   Negative                 Microbiology Results Abnormal     Procedure Component Value - Date/Time    Blood Culture - Blood, Arm, Left [[id number]]  (Normal) Collected: 05/06/22 1500    Lab Status: Final result Specimen: Blood from Arm, Left Updated: 05/11/22 1548     Blood Culture No growth at 5 days    Blood Culture - Blood, Arm, Right [[id number]]  (Normal) Collected: 05/06/22 1520    Lab Status: Final result Specimen: Blood from Arm, Right Updated: 05/11/22 1548     Blood Culture No growth at 5 days    S. Pneumo Ag Urine or CSF - Urine, Urine, Clean Catch [[id number]]  (Normal) Collected: 05/06/22 1822    Lab Status: Final result  Specimen: Urine, Clean Catch Updated: 05/07/22 0639     Strep Pneumo Ag Negative    Legionella Antigen, Urine - Urine, Urine, Clean Catch [[id number]]  (Normal) Collected: 05/06/22 1822    Lab Status: Final result Specimen: Urine, Clean Catch Updated: 05/07/22 0639     LEGIONELLA ANTIGEN, URINE Negative    COVID PRE-OP / PRE-PROCEDURE SCREENING ORDER (NO ISOLATION) - Swab, Nasopharynx [[id number]]  (Normal) Collected: 05/06/22 1418    Lab Status: Final result Specimen: Swab from Nasopharynx Updated: 05/06/22 1445    Narrative:      The following orders were created for panel order COVID PRE-OP / PRE-PROCEDURE SCREENING ORDER (NO ISOLATION) - Swab, Nasopharynx.  Procedure                               Abnormality         Status                     ---------                               -----------         ------                     COVID-19 and FLU A/B PCR...[[id number]]  Normal              Final result                 Please view results for these tests on the individual orders.    COVID-19 and FLU A/B PCR - Swab, Nasopharynx [[id number]]  (Normal) Collected: 05/06/22 1418    Lab Status: Final result Specimen: Swab from Nasopharynx Updated: 05/06/22 1445     COVID19 Not Detected     Influenza A PCR Not Detected     Influenza B PCR Not Detected    Narrative:      Fact sheet for providers: https://www.fda.gov/media/963899/download    Fact sheet for patients: https://www.fda.gov/media/258413/download    Test performed by PCR.          Imaging Results (All)     Procedure Component Value Units Date/Time    SCANNED - IMAGING [[id number]] Resulted: 05/06/22     Updated: 05/11/22 1545    SCANNED - IMAGING [[id number]] Resulted: 05/06/22     Updated: 05/11/22 1545    SCANNED - IMAGING [[id number]] Resulted: 05/06/22     Updated: 05/11/22 1545    SCANNED - IMAGING [[id number]] Resulted: 05/06/22     Updated: 05/11/22 1545    SCANNED - IMAGING [[id number]] Resulted: 05/06/22     Updated: 05/11/22 1545    SCANNED - IMAGING [[id number]]  Resulted: 05/06/22     Updated: 05/11/22 1545    SCANNED - IMAGING [[id number]] Resulted: 05/06/22     Updated: 05/11/22 1545    SCANNED - IMAGING [[id number]] Resulted: 05/06/22     Updated: 05/11/22 1545    CT Outside Films [[id number]] Resulted: 05/11/22 1146     Updated: 05/11/22 1146    Narrative:      This procedure was auto-finalized with no dictation required.    CT Outside Films [[id number]] Resulted: 05/11/22 1145     Updated: 05/11/22 1145    Narrative:      This procedure was auto-finalized with no dictation required.    CT Outside Films [[id number]] Resulted: 05/11/22 1124     Updated: 05/11/22 1124    Narrative:      This procedure was auto-finalized with no dictation required.    CT Outside Films [[id number]] Resulted: 05/11/22 1123     Updated: 05/11/22 1123    Narrative:      This procedure was auto-finalized with no dictation required.    CT Outside Films [[id number]] Resulted: 05/11/22 1123     Updated: 05/11/22 1123    Narrative:      This procedure was auto-finalized with no dictation required.    CT Outside Films [[id number]] Resulted: 05/11/22 1122     Updated: 05/11/22 1122    Narrative:      This procedure was auto-finalized with no dictation required.    XR Chest 1 View [[id number]] Collected: 05/11/22 0842     Updated: 05/11/22 0846    Narrative:      DATE OF EXAM: 5/11/2022 6:27 AM     PROCEDURE: XR CHEST 1 VW-     INDICATIONS: follow up pulm fibrosis, infiltrates, hypoxia. also  diuresing; R09.02-Hypoxemia; J18.9-Pneumonia, unspecified organism;  R06.02-Shortness of breath; Z76.89-Persons encountering health services  in other specified circumstances     COMPARISON: 05/09/2022     TECHNIQUE: Single radiographic AP view of the chest was obtained.     FINDINGS:  Heart size stable. No change in appearance of diffuse interstitial lung  disease. No new pulmonary abnormality. Costophrenic angles sharp        Impression:      Stable diffuse interstitial lung disease compatible with fibrosis     This  report was finalized on 5/11/2022 8:43 AM by Shola Lopez.       XR Chest 1 View [[id number]] Collected: 05/09/22 0825     Updated: 05/09/22 0830    Narrative:      DATE OF EXAM: 5/9/2022 6:25 AM     PROCEDURE: XR CHEST 1 VW-     INDICATIONS: Respiratory failure, fibrosis; R09.02-Hypoxemia;  J18.9-Pneumonia, unspecified organism; R06.02-Shortness of breath;  Z76.89-Persons encountering health services in other specified  circumstances     COMPARISON: 5/8/2022     TECHNIQUE: Single radiographic AP view of the chest was obtained.     FINDINGS:  Stable cardiomediastinal silhouette with redemonstration of mild  cardiomegaly. There is diffuse coarsened interstitial changes throughout  the lungs compatible with underlying fibrosis. No definite focal  consolidation or evidence of new airspace disease. No pleural effusion  or pneumothorax.        Impression:      No significant interval change.     This report was finalized on 5/9/2022 8:27 AM by Noe Peters MD.       XR Chest 1 View [[id number]] Collected: 05/09/22 0754     Updated: 05/09/22 0800    Narrative:      XR CHEST 1 VW-     Date of Exam: 5/8/2022 8:00 PM     Indication: Syncope/hypoxia; R09.02-Hypoxemia; J18.9-Pneumonia,  unspecified organism; R06.02-Shortness of breath; Z76.89-Persons  encountering health services in other specified circumstances.     Comparison:?05/08/2022 at 7:42 AM, 05/06/2022     Technique:?A single view of the chest was obtained.     FINDINGS:     Cardiomegaly is stable.  There are persistent coarsened interstitial  markings throughout both lungs consistent with changes of chronic lung  disease.  There is no focal airspace consolidation.  No definite  pneumothorax.  No evidence of pleural effusion.       Impression:            1.  No change in prominent interstitial markings throughout both lungs  compatible changes of chronic interstitial lung disease and pulmonary  fibrosis.  No new focal airspace consolidation.        This report was  finalized on 5/9/2022 7:57 AM by Adan Griffiths MD.       XR Chest 1 View [[id number]] Collected: 05/08/22 0811     Updated: 05/08/22 0816    Narrative:      DATE OF EXAM: 5/8/2022 7:46 AM     PROCEDURE: XR CHEST 1 VW-     INDICATIONS: follow up pulm fibrosis, infiltrates, assess volume status;  R09.02-Hypoxemia; J18.9-Pneumonia, unspecified organism;  R06.02-Shortness of breath; Z76.89-Persons encountering health services  in other specified circumstances     COMPARISON: 05/06/2022     TECHNIQUE: Single radiographic AP view of the chest was obtained.     FINDINGS:  Heart size stable. Stable appearance of diffuse interstitial markings  throughout both lungs. No focal airspace consolidation. Costophrenic  angles sharp        Impression:      Stable appearance of diffuse interstitial lung disease, presumably  fibrosis. No acute infiltrate demonstrated currently     This report was finalized on 5/8/2022 8:13 AM by Shola Lopez.       XR Chest 1 View [[id number]] Collected: 05/06/22 1403     Updated: 05/06/22 1408    Narrative:      DATE OF EXAM: 5/6/2022 1:58 PM     PROCEDURE: XR CHEST 1 VW-     INDICATIONS: SOA triage protocol     COMPARISON: 8/10/2021     TECHNIQUE: Single radiographic AP view of the chest was obtained.     FINDINGS:  Heart shadow is normal in size. Vasculature appears normal. Diffuse and  extensive reticular interstitial disease throughout the lungs appears to  be increased from the prior study, although in part this is due to lower  lung volumes. There appears to be focally increased disease in the left  upper lung out of proportion to changes elsewhere, at least potentially  a superimposed pneumonia. No dense lung consolidation effusion or  pneumothorax is seen.        Impression:         1. Extensive bilateral pulmonary interstitial disease, similar to  8/10/2021 and presumably representing fibrosis.  2. Generalized worsening appearance of the chest in part due to low lung  volumes.  3.  Asymmetrically denser disease of the left upper lung may represent a  superimposed pneumonia.     This report was finalized on 5/6/2022 2:05 PM by Dr. Arnold Sr MD.             Results for orders placed in visit on 05/26/21    Echocardiogram Scan          Discharge Details        Discharge Medications      New Medications      Instructions Start Date   benzonatate 100 MG capsule  Commonly known as: TESSALON   100 mg, Oral, 3 Times Daily PRN      guaiFENesin-dextromethorphan 100-10 MG/5ML syrup  Commonly known as: ROBITUSSIN DM   5 mL, Oral, Every 4 Hours PRN      insulin detemir 100 UNIT/ML injection  Commonly known as: LEVEMIR   30 Units, Subcutaneous, Daily   Start Date: May 13, 2022     Insulin Lispro 100 UNIT/ML injection  Commonly known as: humaLOG   6 Units, Subcutaneous, 3 Times Daily With Meals      Insulin Lispro 100 UNIT/ML injection  Commonly known as: humaLOG   0-9 Units, Subcutaneous, 4 Times Daily With Meals & Nightly      ipratropium-albuterol 0.5-2.5 mg/3 ml nebulizer  Commonly known as: DUO-NEB   3 mL, Nebulization, Every 4 Hours PRN      ipratropium-albuterol 0.5-2.5 mg/3 ml nebulizer  Commonly known as: DUO-NEB   3 mL, Nebulization, Every 6 Hours While Awake - RT      ondansetron 2 mg/mL injection  Commonly known as: ZOFRAN   4 mg, Intravenous, Every 6 Hours PRN      predniSONE 20 MG tablet  Commonly known as: DELTASONE   60 mg, Oral, Daily With Breakfast   Start Date: May 13, 2022     sulfamethoxazole-trimethoprim 800-160 MG per tablet  Commonly known as: BACTRIM DS,SEPTRA DS   1 tablet, Oral, 3 Times Weekly   Start Date: May 13, 2022        Changes to Medications      Instructions Start Date   ALPRAZolam 0.5 MG tablet  Commonly known as: XANAX  What changed:   · medication strength  · how much to take  · when to take this   0.5 mg, Oral, Every 8 Hours PRN      lisinopril 5 MG tablet  Commonly known as: PRINIVIL,ZESTRIL  What changed:   · medication strength  · how much to take   5 mg, Oral,  Daily   Start Date: May 13, 2022     pantoprazole 40 MG EC tablet  Commonly known as: PROTONIX  What changed: when to take this   40 mg, Oral, Every Early Morning   Start Date: May 13, 2022        Continue These Medications      Instructions Start Date   divalproex 500 MG DR tablet  Commonly known as: DEPAKOTE   750 mg, Oral, 2 Times Daily      FLUoxetine 40 MG capsule  Commonly known as: PROzac   40 mg, Oral, Every Morning      PIRFENIDONE PO   267 mg, Oral, 3 Times Daily      QUEtiapine 25 MG tablet  Commonly known as: SEROquel   25 mg, Oral, 2 Times Daily, 8 am and 1 pm      QUEtiapine 300 MG tablet  Commonly known as: SEROquel   300 mg, Oral, Nightly      Symbicort 160-4.5 MCG/ACT inhaler  Generic drug: budesonide-formoterol   2 puffs, Inhalation, 2 Times Daily - RT         Stop These Medications    albuterol sulfate  (90 Base) MCG/ACT inhaler  Commonly known as: PROVENTIL HFA;VENTOLIN HFA;PROAIR HFA     aspirin 81 MG EC tablet     BASAGLAR KWIKPEN 100 UNIT/ML injection pen     busPIRone 30 MG tablet  Commonly known as: BUSPAR     Farxiga 5 MG tablet tablet  Generic drug: dapagliflozin     hydrOXYzine 10 MG tablet  Commonly known as: ATARAX     metFORMIN  MG 24 hr tablet  Commonly known as: GLUCOPHAGE-XR     nicotine 21 MG/24HR patch  Commonly known as: NICODERM CQ     Rivaroxaban 2.5 MG tablet  Commonly known as: Xarelto     rosuvastatin 10 MG tablet  Commonly known as: CRESTOR     Sod Picosulfate-Mag Ox-Cit Acd 10-3.5-12 MG-GM -GM/160ML solution     Spiriva HandiHaler 18 MCG per inhalation capsule  Generic drug: tiotropium     sucralfate 1 g tablet  Commonly known as: CARAFATE            No Known Allergies      Discharge Disposition:  [facility]      Discharge Diet:  Diet Order   Procedures   • Diet Regular; Cardiac, Consistent Carbohydrate       Discharge Activity:        CODE STATUS:    Code Status and Medical Interventions:   Ordered at: 05/11/22 1359     Medical Intervention Limits:    NO intubation  (DNI)     Level Of Support Discussed With:    Patient     Code Status (Patient has no pulse and is not breathing):    No CPR (Do Not Attempt to Resuscitate)     Medical Interventions (Patient has pulse or is breathing):    Limited Support         No future appointments.            Electronically signed by JENNI Granados, 05/12/22, 10:06 PM EDT.       Time Spent on Discharge: I spent  35  minutes on this discharge activity which included: face-to-face encounter with the patient, reviewing the data in the system, coordination of the care with the nursing staff as well as consultants, documentation, and entering orders.

## 2022-05-12 NOTE — PROGRESS NOTES
Pulmonary/Critical Care Follow-up     LOS: 6 days   Patient Care Team:  Sylvia Parker PA as PCP - General (Physician Assistant)  Hang Wagoner MD as Consulting Physician (Cardiology)     Chief Complaint   Patient presents with   • Shortness of Breath     Subjective      Initial history (from 5/7/22):    This is an unfortunate 51-year-old gentleman with a past medical history significant for prior cigarette abuse as well as a diagnosis of probable idiopathic pulmonary fibrosis since at least 2017 currently on pirfenidone antifibrotic therapy who also is typically on 7 to 8 L of nasal cannula oxygen at home.  At baseline he is very short of breath and limited in his activities.  By records, he previously had been under evaluation at  for lung transplant and while details of that are not completely forthcoming, apparently there was a concern about possible compliance and some psychiatric concerns.  He follows with Dr. Padilla, pulmonology, and [facility].       He apparently presented to the emergency department yesterday in [place], [place], with complaints of approximately 2 days of worsened shortness of breath.  He denied any fevers but states he did have perhaps some chills the other day.  He has not been bringing up much in the way of secretions however when he does bring it up it is yellow in coloration.  He states he typically does not have sputum expectoration with a cough.  He denied hemoptysis.  He has denied rashes.  His saturations have gotten as low as the 50s per his report and he has required higher levels of oxygen support since his arrival here.  At that emergency department he underwent a CT scan of the chest which I believe was with contrast.  Apparently there were no pulmonary emboli.  The report suggested diffuse bilateral groundglass disease.  Swabs for flu and COVID were negative.  Unfortunately I do not have these films to review myself.  Chest x-ray at our facility does  show bilateral interstitial changes consistent with his known fibrosis which appear to be grossly unchanged compared to a 2021 film.    (End of copied text).    Patient is status post 3 days of 500 mg IV Solu-Medrol.      Interval History:     Patient is essentially stable.  He was again on 65% high flow nasal cannula and satting in the upper 90s.  I again turned him down to 50% high flow nasal cannula.  His oxygen saturations are 90 to 92% while I watched him for 10 minutes in the room on this.  No fevers or chills.  I spoke with  multiple times today including the transplant coordinator, Dr. Deng with the transfer center, and Dr. Gee from the transplant service.  Patient has been accepted there in transfer but they report he requires an ICU bed.  He is currently on the list for transfer.     History taken from: Patient.    PMH/FH/Social History were reviewed and updated appropriately in the electronic medical record.     Review of Systems:    Review of 14 systems was completed with positives and pertinent negatives noted in the subjective section.  All other systems reviewed and are negative.       Objective     Vital Signs  Temp:  [97.7 °F (36.5 °C)-98.2 °F (36.8 °C)] 98 °F (36.7 °C)  Heart Rate:  [65-91] 88  Resp:  [18-30] 18  BP: (101-121)/(65-88) 121/85  05/11 0701 - 05/12 0700  In: 1350 [P.O.:1100]  Out: 4405 [Urine:4405]  Body mass index is 28.88 kg/m².     IV drips:      Physical Exam:     Constitutional:   Alert, cooperative, in no acute distress   Head:   Normocephalic, without obvious abnormality, atraumatic   Eyes:           Lids and lashes normal, conjunctivae and sclerae normal.  No icterus, no pallor, corneas clear, PER   ENMT:  Ears appear intact with no abnormalities noted     No oral lesions, no thrush, oral mucosa moist   Neck:  No adenopathy, supple, trachea midline, no thyromegaly, no JVD   Lungs/Resp:    Normal effort, symmetric chest rise, no crepitus, moderate to severe bilateral  posterior rales, worse at the bases, no chest wall tenderness                Heart/CV:   Regular rhythm and normal rate, normal S1 and S2, no            murmur   Abdomen/GI:    Normal bowel sounds, no masses, no organomegaly, soft,        nontender, nondistended   :    Deferred   Extremities/MSK:  Severe clubbing.  No edema.  Normal tone.    No deformities.    Pulses:  Pulses palpable and equal bilaterally   Skin:  No bleeding, bruising or rash   Heme/Lymph:  No cervical or supraclavicular adenopathy.   Neurologic:    Psychiatric:    Moves all extremities with no obvious focal motor deficit.  Cranial nerves 2 - 12 grossly intact  Oriented x 3, normal affect.    The above physical exam findings were reviewed and reflect my exam findings as of today's exam.   Electronically signed by:  Magdi Wright MD  05/12/22  13:45 EDT      Results Review:     I reviewed the patient's new clinical results.   Results from last 7 days   Lab Units 05/12/22  0519 05/11/22  0536 05/10/22  0822 05/07/22  0725 05/06/22  1358   SODIUM mmol/L 138 142 138   < > 131*   POTASSIUM mmol/L 4.2 3.8 4.7   < > 4.5   CHLORIDE mmol/L 103 104 102   < > 90*   CO2 mmol/L 26.0 29.0 27.0   < > 26.0   BUN mg/dL 16 19 19   < > 15   CREATININE mg/dL 0.55* 0.47* 0.57*   < > 0.84   CALCIUM mg/dL 8.3* 8.4* 8.9   < > 9.8   BILIRUBIN mg/dL  --   --   --   --  0.4   ALK PHOS U/L  --   --   --   --  144*   ALT (SGPT) U/L  --   --   --   --  29   AST (SGOT) U/L  --   --   --   --  32   GLUCOSE mg/dL 171* 124* 114*   < > 351*    < > = values in this interval not displayed.     Results from last 7 days   Lab Units 05/12/22  0519 05/11/22  0536 05/10/22  0822   WBC 10*3/mm3 10.89* 10.46 11.77*   HEMOGLOBIN g/dL 14.6 14.1 14.9   HEMATOCRIT % 42.6 40.5 43.2   PLATELETS 10*3/mm3 216 214 219     Results from last 7 days   Lab Units 05/11/22  1356   PH, ARTERIAL pH units 7.420   PO2 ART mm Hg 79.8*   PCO2, ARTERIAL mm Hg 41.9   HCO3 ART mmol/L 27.2*     Results from  last 7 days   Lab Units 05/12/22  0519 05/11/22  0536 05/10/22  0822   MAGNESIUM mg/dL 2.2 2.2 2.3     I obtained and reviewed the patient's pathology report from 5/23/2016.  It showed pulmonary fibrosis with a pattern most consistent with usual interstitial pneumonia pattern.  Initially there was also the suggestion of pulmonary Langerhans' cell histiocytosis, however an addendum was made but stated that the specimen was not consistent with Langerhans histiocytosis.    I reviewed the patient's new imaging including images and reports.    CT angiogram from 5/5/2022 was reviewed.  I reviewed both the images and the report.  There was severe pulmonary fibrotic changes with subpleural emphysema and groundglass disease.  Pulmonary artery appears to be somewhat enlarged consistent with pulmonary hypertension.    Medication Review:   budesonide-formoterol, 2 puff, Inhalation, BID - RT  cefTRIAXone, 1 g, Intravenous, Q24H  divalproex, 750 mg, Oral, BID  FLUoxetine, 40 mg, Oral, QAM  heparin (porcine), 5,000 Units, Subcutaneous, Q8H  [START ON 5/13/2022] insulin detemir, 30 Units, Subcutaneous, Daily  insulin lispro, 0-9 Units, Subcutaneous, 4x Daily With Meals & Nightly  Insulin Lispro, 6 Units, Subcutaneous, TID With Meals  ipratropium-albuterol, 3 mL, Nebulization, Q6H While Awake - RT  lisinopril, 5 mg, Oral, Daily  pantoprazole, 40 mg, Oral, Q AM  Pirfenidone, 267 mg, Oral, TID  predniSONE, 60 mg, Oral, Daily With Breakfast  QUEtiapine, 25 mg, Oral, BID  QUEtiapine, 300 mg, Oral, Nightly  sodium chloride, 10 mL, Intravenous, Q12H  sulfamethoxazole-trimethoprim, 1 tablet, Oral, Once per day on Mon Wed Fri           Assessment & Plan         Acute on chronic respiratory failure with hypoxia (HCC)    PVD (peripheral vascular disease) with claudication (HCC)    Essential hypertension    Type 2 diabetes mellitus, without long-term current use of insulin (HCC)    Pulmonary fibrosis, presumed IPF currently on antifibrotic and  8 L nasal cannula oxygen at home    Abnormal chest x-ray, rule out pneumonia versus IPF exacerbation versus    51 y.o. with what sounds like UIP/IPF followed by Dr. Padilla in [facility] from a pulmonary standpoint.  The patient has previously been seen at [facility] transplant clinic back in 2017 and 2018.  It appears, based on notes reviewed from the transplant coordinator in care everywhere, that the patient did not want to be followed on an ongoing basis because he did not feel that he needed a transplant at the time did not see the point in following up regularly there.    The patient is now admitted with worsening hypoxemic respiratory failure.  CT showed no evidence of pulmonary embolism but did show some groundglass disease diffusely along with the chronic fibrotic changes.  I obtained copies of the images from [facility].  The patient was treated with pulse dose Solu-Medrol 500 mg daily for 3 days.  Currently on 60 mg prednisone daily.  I will add Bactrim DS 3 times a week for PCP prophylaxis and plan to taper over about 6 weeks.    The patient is interested in transplant evaluation at this point.  I spoke with multiple people at [facility] and he has been accepted in transfer for urgent transplant evaluation.  He is awaiting an ICU bed there given his hypoxemia.      Patient reports he quit smoking about a month prior to this hospitalization but has been using a nicotine patch.  His carboxyhemoglobin on his initial ABG was within normal limits so I would tend to believe that he has been abstinent from smoking prior to admission.    Again discussed with nursing staff about goal oxygen saturations of 87 to 90%.    Plan:  1.  For pulmonary fibrosis: Possible acute exacerbation.  Completed 3 days of 500 mg Solu-Medrol then decreased to 60 mg prednisone daily with taper over 6 weeks.  Bactrim DS for PCP prophylaxis while steroid dose greater or equal than 20 mg prednisone daily.  Wean  "oxygen as tolerated.  I may repeat an arterial blood gas and consider additional diuretics versus Diamox.  2.  For possible pneumonia: Complete current antibiotic course.  3.  For possible volume overload, congestive heart failure: I would try to keep the patient as \"dry\" as possible.  He received Bumex 1 mg yesterday with no worsening of renal function although his serum bicarbonate has gone up a bit.  I will give him a dose of Diamox as he is a little bit alkalotic on his ABG.  Watch labs closely.  4.  Awaiting transfer to [facility] for transplant evaluation.    5.  For cigarette nicotine dependence: Patient reports he quit about a month prior to hospitalization but has been using a nicotine patch.  Carboxyhemoglobin on initial ABG was within normal limits which supports this.   transplant requires he be completely off of all nicotine products.  I discontinue his nicotine patch.  Counseled on ongoing smoking cessation.  6.  We will continue to follow.      I spent 45 minutes on patient visit today including 35 minutes worth of counseling and coordination of care including multiple discussions with physicians both at this facility and at [facility].    Level of Risk High due to:  illness with threat to life or bodily function acute on chronic respiratory failure with high risk medication management.    Discussed with Dr. Miranda.    Electronically signed by:    Magdi Wright MD  05/12/22  13:45 EDT      *. Please note that portions of this note were completed with [employer] - a voice recognition program.     "

## 2022-05-12 NOTE — PLAN OF CARE
Goal Outcome Evaluation:              Outcome Evaluation: A&Ox4. VSS on Bipap . NSR on monitor. FSBG q3. no other events overnight.

## 2022-05-12 NOTE — PLAN OF CARE
Problem: Fall Injury Risk  Goal: Absence of Fall and Fall-Related Injury  Outcome: Ongoing, Not Progressing  Intervention: Identify and Manage Contributors  Recent Flowsheet Documentation  Taken 5/12/2022 1600 by Julieta Aranda RN  Self-Care Promotion:   independence encouraged   BADL personal objects within reach   BADL personal routines maintained   safe use of adaptive equipment encouraged  Taken 5/12/2022 1400 by Julieta Aranda RN  Self-Care Promotion:   independence encouraged   BADL personal objects within reach   BADL personal routines maintained   other (see comments)  Taken 5/12/2022 1200 by Julieta Aranda RN  Self-Care Promotion:   independence encouraged   BADL personal objects within reach   BADL personal routines maintained   safe use of adaptive equipment encouraged   meal set-up provided  Taken 5/12/2022 1000 by Julieta Aranda RN  Self-Care Promotion:   independence encouraged   BADL personal objects within reach   BADL personal routines maintained   safe use of adaptive equipment encouraged  Taken 5/12/2022 0800 by Julieta Aranda RN  Medication Review/Management: medications reviewed  Self-Care Promotion:   independence encouraged   BADL personal objects within reach   BADL personal routines maintained   safe use of adaptive equipment encouraged  Intervention: Promote Injury-Free Environment  Recent Flowsheet Documentation  Taken 5/12/2022 1600 by Julieta Aranda, RN  Safety Promotion/Fall Prevention:   activity supervised   assistive device/personal items within reach   clutter free environment maintained   nonskid shoes/slippers when out of bed   room organization consistent   safety round/check completed  Taken 5/12/2022 1400 by Julieta Aranda, RN  Safety Promotion/Fall Prevention:   activity supervised   assistive device/personal items within reach   clutter free environment maintained   fall prevention program maintained   nonskid shoes/slippers when out of bed   room organization consistent    safety round/check completed  Taken 5/12/2022 1200 by Julieta Aranda, RN  Safety Promotion/Fall Prevention:   activity supervised   clutter free environment maintained   assistive device/personal items within reach   fall prevention program maintained   nonskid shoes/slippers when out of bed   room organization consistent   safety round/check completed  Taken 5/12/2022 1000 by Julieta Aranda, RN  Safety Promotion/Fall Prevention:   activity supervised   assistive device/personal items within reach   clutter free environment maintained   fall prevention program maintained   nonskid shoes/slippers when out of bed   room organization consistent   safety round/check completed  Taken 5/12/2022 0800 by Julieta Aranda, RN  Safety Promotion/Fall Prevention:   activity supervised   assistive device/personal items within reach   clutter free environment maintained   fall prevention program maintained   nonskid shoes/slippers when out of bed   room organization consistent   safety round/check completed   Goal Outcome Evaluation:  Plan of Care Reviewed With: patient, spouse        Progress: no change

## 2022-05-12 NOTE — CASE MANAGEMENT/SOCIAL WORK
Continued Stay Note  [facility]     Patient Name: Melchor Marie  MRN: [id number]  Today's Date: 5/12/2022    Admit Date: 5/6/2022     Discharge Plan     Row Name 05/12/22 1537       Plan    Plan Comments CM has spoken to [facility] about a possible on call ambulance if a bed opens up at . If one becomes available in within the next 24 hours the nurse can call [phone] and they will work on arranging transport. PCS has been placed on [facility]. CM has also updated the house supervisor in case [facility] is unable to transport and CM is not here they can call [facility].               Discharge Codes    No documentation.               Expected Discharge Date and Time     Expected Discharge Date Expected Discharge Time    May 13, 2022             Palma Puentes RN

## 2022-05-12 NOTE — PLAN OF CARE
Goal Outcome Evaluation:  Plan of Care Reviewed With: patient, spouse        Progress: no change  Outcome Evaluation: SPT to chair with CGA and UE support.  Pt on HFNC, O2 with occasional drops to 87% during mobility but would quickly improve to 91% with focused breathing.  Thus, required several rest breaks during mobility with focused breathing

## 2022-05-12 NOTE — THERAPY TREATMENT NOTE
Patient Name: Melchor Marie  : 1970    MRN: [id number]                              Today's Date: 2022       Admit Date: 2022    Visit Dx:     ICD-10-CM ICD-9-CM   1. Hypoxia  R09.02 799.02   2. Multifocal pneumonia  J18.9 486   3. Shortness of breath  R06.02 786.05   4. Referred by primary care physician  Z76.89 V68.89   5. Examination for normal comparison for clinical research  Z00.6 V70.7   6. Pulmonary fibrosis, presumed IPF currently on antifibrotic and 8 L nasal cannula oxygen at home  J84.10 515     Patient Active Problem List   Diagnosis   • PVD (peripheral vascular disease) with claudication (HCC)   • Hyperlipidemia LDL goal <70   • Essential hypertension   • Tobacco use   • Intractable abdominal pain   • Bulimia nervosa   • Duodenitis   • Starvation ketoacidosis   • Dehydration   • Hyponatremia   • Chest pain at rest   • Type 2 diabetes mellitus, without long-term current use of insulin (HCC)   • Hypoxia   • Acute on chronic respiratory failure with hypoxia (HCC)   • Pulmonary fibrosis, presumed IPF currently on antifibrotic and 8 L nasal cannula oxygen at home   • Abnormal chest x-ray, rule out pneumonia versus IPF exacerbation versus     Past Medical History:   Diagnosis Date   • Chest pain    • Cholelithiasis    • Hyperlipidemia    • Hypertension    • Nerve damage     tooth pick injury   • Tobacco use    • Type 2 diabetes mellitus (HCC)      Past Surgical History:   Procedure Laterality Date   • CHOLECYSTECTOMY     • ENDOSCOPY N/A 2021    Procedure: ESOPHAGOGASTRODUODENOSCOPY;  Surgeon: Servando Tyler MD;  Location: [facility] ENDOSCOPY;  Service: Gastroenterology;  Laterality: N/A;   • FOOT SURGERY Right       General Information     Row Name 22 1555          Physical Therapy Time and Intention    Document Type therapy note (daily note)  -KG     Mode of Treatment physical therapy  -KG     Row Name 22 1555          General Information    Patient Profile Reviewed yes   -KG     Existing Precautions/Restrictions fall;oxygen therapy device and L/min;other (see comments)  HFNC  -KG     Row Name 05/12/22 1555          Cognition    Orientation Status (Cognition) oriented x 4  -KG     Row Name 05/12/22 1555          Safety Issues, Functional Mobility    Impairments Affecting Function (Mobility) endurance/activity tolerance;shortness of breath;strength;pain  -KG           User Key  (r) = Recorded By, (t) = Taken By, (c) = Cosigned By    Initials Name Provider Type    Birgit Diego Physical Therapist               Mobility     Row Name 05/12/22 1555          Bed Mobility    Bed Mobility supine-sit;scooting/bridging  -KG     Scooting/Bridging [place] (Bed Mobility) modified independence  -KG     Supine-Sit [place] (Bed Mobility) modified independence  -KG     Assistive Device (Bed Mobility) bed rails;head of bed elevated  -KG     Comment, (Bed Mobility) good safety, cues for focus on breathing  -KG     Row Name 05/12/22 1555          Transfers    Comment, (Transfers) cues HP, STS with UE support, SPT to chair with CGA  -KG     Row Name 05/12/22 1555          Bed-Chair Transfer    Bed-Chair [place] (Transfers) contact guard  -KG     Assistive Device (Bed-Chair Transfers) other (see comments)  UE support  -KG     Row Name 05/12/22 1555          Sit-Stand Transfer    Sit-Stand [place] (Transfers) contact guard;verbal cues  -KG     Row Name 05/12/22 1555          Gait/Stairs (Locomotion)    [place] Level (Gait) other (see comments)  deferred due to respiratory status  -KG           User Key  (r) = Recorded By, (t) = Taken By, (c) = Cosigned By    Initials Name Provider Type    Birgit Diego Physical Therapist               Obj/Interventions     Row Name 05/12/22 1558          Balance    Dynamic Standing Balance contact guard  -KG     Position/Device Used, Standing Balance supported  -KG     Balance Interventions standing;sit to stand;weight shifting  activity  -KG           User Key  (r) = Recorded By, (t) = Taken By, (c) = Cosigned By    Initials Name Provider Type    Birgit Diego Physical Therapist               Goals/Plan    No documentation.                Clinical Impression     Row Name 05/12/22 1558          Pain    Pretreatment Pain Rating 0/10 - no pain  -KG     Posttreatment Pain Rating 0/10 - no pain  -KG     Row Name 05/12/22 1558          Plan of Care Review    Plan of Care Reviewed With patient;spouse  -KG     Progress no change  -KG     Outcome Evaluation SPT to chair with CGA and UE support.  Pt on HFNC, O2 with occasional drops to 87% during mobility but would quickly improve to 91% with focused breathing.  Thus, required several rest breaks during mobility with focused breathing  -KG     Row Name 05/12/22 1558          Vital Signs    Post Systolic BP Rehab 120  -KG     Post Treatment Diastolic BP 70  -KG     Pre SpO2 (%) 92  -KG     O2 Delivery Pre Treatment nasal cannula  -KG     Intra SpO2 (%) 87  -KG     O2 Delivery Intra Treatment nasal cannula  -KG     Post SpO2 (%) 91  -KG     O2 Delivery Post Treatment supplemental O2  -KG     Row Name 05/12/22 1558          Positioning and Restraints    Pre-Treatment Position in bed  -KG     Post Treatment Position chair  -KG     In Chair notified nsg;call light within reach;encouraged to call for assist;exit alarm on;with family/caregiver;waffle cushion;legs elevated  -KG           User Key  (r) = Recorded By, (t) = Taken By, (c) = Cosigned By    Initials Name Provider Type    Birgit Diego Physical Therapist               Outcome Measures     Row Name 05/12/22 1601 05/12/22 0800       How much help from another person do you currently need...    Turning from your back to your side while in flat bed without using bedrails? 4  -KG 3  -LC    Moving from lying on back to sitting on the side of a flat bed without bedrails? 4  -KG 3  -LC    Moving to and from a bed to a chair (including a  wheelchair)? 3  -KG 3  -LC    Standing up from a chair using your arms (e.g., wheelchair, bedside chair)? 3  -KG 3  -LC    Climbing 3-5 steps with a railing? 3  -KG 3  -LC    To walk in hospital room? 3  -KG 3  -LC    AM-PAC 6 Clicks Score (PT) 20  -KG 18  -LC    Highest level of mobility 6 --> Walked 10 steps or more  -KG 6 --> Walked 10 steps or more  -LC    Row Name 05/12/22 1601          Functional Assessment    Outcome Measure Options AM-PAC 6 Clicks Basic Mobility (PT)  -KG           User Key  (r) = Recorded By, (t) = Taken By, (c) = Cosigned By    Initials Name Provider Type    Julieta Russell RN Registered Nurse    Birgit Dieog Physical Therapist                             Physical Therapy Education                 Title: PT OT SLP Therapies (Done)     Topic: Physical Therapy (Done)     Point: Mobility training (Done)     Learning Progress Summary           Patient Acceptance, E,TB, VU by KG at 5/12/2022 1601    Acceptance, E, VU,NR by CD at 5/9/2022 1612    Comment: BENEFITS OF OOB ACTIVITY, SAFETY WITH MOBILITY, PROGRESSION OF POC, D/C PLANNING.                   Point: Home exercise program (Done)     Learning Progress Summary           Patient Acceptance, E,TB, VU by KG at 5/12/2022 1601    Acceptance, E, VU,NR by CD at 5/9/2022 1612    Comment: BENEFITS OF OOB ACTIVITY, SAFETY WITH MOBILITY, PROGRESSION OF POC, D/C PLANNING.                   Point: Body mechanics (Done)     Learning Progress Summary           Patient Acceptance, E,TB, VU by KG at 5/12/2022 1601    Acceptance, E, VU,NR by CD at 5/9/2022 1612    Comment: BENEFITS OF OOB ACTIVITY, SAFETY WITH MOBILITY, PROGRESSION OF POC, D/C PLANNING.                   Point: Precautions (Done)     Learning Progress Summary           Patient Acceptance, E,TB, VU by KG at 5/12/2022 1601    Acceptance, E, VU,NR by CD at 5/9/2022 1612    Comment: BENEFITS OF OOB ACTIVITY, SAFETY WITH MOBILITY, PROGRESSION OF POC, D/C PLANNING.                                User Key     Initials Effective Dates Name Provider Type Discipline    CD 06/16/21 -  Carol Johnson, PT Physical Therapist PT    KG 06/16/21 -  Birgit Gallo Physical Therapist PT              PT Recommendation and Plan     Plan of Care Reviewed With: patient, spouse  Progress: no change  Outcome Evaluation: SPT to chair with CGA and UE support.  Pt on HFNC, O2 with occasional drops to 87% during mobility but would quickly improve to 91% with focused breathing.  Thus, required several rest breaks during mobility with focused breathing     Time Calculation:    PT Charges     Row Name 05/12/22 1604             Time Calculation    Start Time 1520  -KG      PT Received On 05/12/22  -KG      PT Goal Re-Cert Due Date 05/19/22  -KG              Time Calculation- PT    Total Timed Code Minutes- PT 25 minute(s)  -KG              Timed Charges    65049 - PT Therapeutic Activity Minutes 25  -KG              Total Minutes    Timed Charges Total Minutes 25  -KG       Total Minutes 25  -KG            User Key  (r) = Recorded By, (t) = Taken By, (c) = Cosigned By    Initials Name Provider Type    KG Birgit Gallo Physical Therapist              Therapy Charges for Today     Code Description Service Date Service Provider Modifiers Qty    [id number]  PT THERAPEUTIC ACT EA 15 MIN 5/12/2022 Birgit Gallo GP 2          PT G-Codes  Outcome Measure Options: AM-PAC 6 Clicks Basic Mobility (PT)  AM-PAC 6 Clicks Score (PT): 20  AM-PAC 6 Clicks Score (OT): 19    Birgit Gallo  5/12/2022

## 2022-05-12 NOTE — PROGRESS NOTES
[facility] Medicine Services  PROGRESS NOTE    Patient Name: Melchor Marie  : 1970  MRN: [id number]    Date of Admission: 2022  Primary Care Physician: Sylvia Parker PA    Subjective   Subjective     CC:  Hypoxia, dyspnea    HPI:  Anxious, dyspnea stable. No sputum, no fever. No chest pain  ROS:  Gen- No fevers, chills  CV-intermittent chest soreness stable  Resp-dyspnea as above  GI- No N/V/D, abd pain        Objective   Objective     Vital Signs:   Temp:  [97.7 °F (36.5 °C)-98.2 °F (36.8 °C)] 98 °F (36.7 °C)  Heart Rate:  [65-91] 88  Resp:  [18-30] 18  BP: (101-121)/(65-88) 121/85  Flow (L/min):  [50-60] 55     Physical Exam:  Constitutional: sitting up in bed, mildly tachypneic but no accessory muscle use.   HENT: NCAT, mucous membranes moist  Respiratory: On high flow nasal cannula, poor respiratory effort, faint scattered mid insp crackles bilaterally  Cardiovascular: RRR, no murmurs, rubs, or gallops  Gastrointestinal: Positive bowel sounds, soft, nontender, nondistended  Musculoskeletal: No bilateral ankle edema  Psychiatric: mildly anxious  Neurologic: Oriented x 3, strength symmetric in all extremities, Cranial Nerves grossly intact to confrontation, speech clear  Skin: No rashes      Results Reviewed:  LAB RESULTS:      Lab 22  0519 22  0536 05/10/22  0822 22  0706 22  1958 22  0611 22  2244 22  1900 22  0725 22  1742 22  1358   WBC 10.89* 10.46 11.77* 12.26* 11.18* 6.39  --   --  7.04  --  11.97*   HEMOGLOBIN 14.6 14.1 14.9 14.7 14.6 15.2  --   --  14.7  --  17.3   HEMATOCRIT 42.6 40.5 43.2 41.9 40.4 42.5  --   --  44.5  --  50.7   PLATELETS 216 214 219 209 250 222  --   --  220  --  310   NEUTROS ABS  --   --   --  11.24* 10.15* 5.71  --   --  5.05  --  9.28*   IMMATURE GRANS (ABS)  --   --   --  0.04 0.05 0.02  --   --  0.02  --  0.04   LYMPHS ABS  --   --   --  0.74 0.57* 0.60*  --   --  1.42   --  1.68   MONOS ABS  --   --   --  0.23 0.40 0.05*  --   --  0.52  --  0.93*   EOS ABS  --   --   --  0.00 0.00 0.00  --   --  0.00  --  0.00   MCV 91.0 89.6 89.8 88.2 86.3 86.9  --   --  94.7  --  90.5   PROCALCITONIN  --   --   --   --   --   --   --   --   --   --  0.09   LACTATE  --   --   --   --  3.6* 1.4 2.7* 2.7*  --  1.6 2.8*         Lab 05/12/22  0519 05/11/22  0536 05/10/22  0822 05/09/22  0706 05/08/22 1958 05/08/22  0611   SODIUM 138 142 138 138 134* 133*   POTASSIUM 4.2 3.8 4.7 4.1 4.0 4.7   CHLORIDE 103 104 102 98 94* 95*   CO2 26.0 29.0 27.0 33.0* 29.0 28.0   ANION GAP 9.0 9.0 9.0 7.0 11.0 10.0   BUN 16 19 19 18 14 14   CREATININE 0.55* 0.47* 0.57* 0.57* 0.71* 0.56*   EGFR 120.7 126.6 119.4 119.4 111.8 120.1   GLUCOSE 171* 124* 114* 173* 319* 426*   CALCIUM 8.3* 8.4* 8.9 9.1 9.2 9.0   MAGNESIUM 2.2 2.2 2.3 2.2  --  2.0   HEMOGLOBIN A1C  --   --   --   --   --  11.70*         Lab 05/06/22  1358   TOTAL PROTEIN 7.8   ALBUMIN 4.00   GLOBULIN 3.8   ALT (SGPT) 29   AST (SGOT) 32   BILIRUBIN 0.4   ALK PHOS 144*         Lab 05/11/22  0536 05/08/22  0611 05/06/22  1358   PROBNP 367.6 568.3 1,824.0*   TROPONIN T  --   --  <0.010                 Lab 05/11/22  1356 05/08/22 2002 05/07/22  1900   PH, ARTERIAL 7.420 7.453* 7.431   PCO2, ARTERIAL 41.9 45.3* 45.8*   PO2 ART 79.8* 53.8* 152.0*   FIO2 83 80 70   HCO3 ART 27.2* 31.7* 30.4*   BASE EXCESS ART 2.3* 6.6* 5.2*   CARBOXYHEMOGLOBIN 1.1 1.4 1.4     Brief Urine Lab Results  (Last result in the past 365 days)      Color   Clarity   Blood   Leuk Est   Nitrite   Protein   CREAT   Urine HCG        05/09/22 1017 Yellow   Clear   Negative   Negative   Negative   Negative                 Microbiology Results Abnormal     Procedure Component Value - Date/Time    Blood Culture - Blood, Arm, Left [[id number]]  (Normal) Collected: 05/06/22 1500    Lab Status: Final result Specimen: Blood from Arm, Left Updated: 05/11/22 1548     Blood Culture No growth at 5 days     Blood Culture - Blood, Arm, Right [[id number]]  (Normal) Collected: 05/06/22 1520    Lab Status: Final result Specimen: Blood from Arm, Right Updated: 05/11/22 1548     Blood Culture No growth at 5 days    S. Pneumo Ag Urine or CSF - Urine, Urine, Clean Catch [[id number]]  (Normal) Collected: 05/06/22 1822    Lab Status: Final result Specimen: Urine, Clean Catch Updated: 05/07/22 0639     Strep Pneumo Ag Negative    Legionella Antigen, Urine - Urine, Urine, Clean Catch [[id number]]  (Normal) Collected: 05/06/22 1822    Lab Status: Final result Specimen: Urine, Clean Catch Updated: 05/07/22 0639     LEGIONELLA ANTIGEN, URINE Negative    COVID PRE-OP / PRE-PROCEDURE SCREENING ORDER (NO ISOLATION) - Swab, Nasopharynx [[id number]]  (Normal) Collected: 05/06/22 1418    Lab Status: Final result Specimen: Swab from Nasopharynx Updated: 05/06/22 1445    Narrative:      The following orders were created for panel order COVID PRE-OP / PRE-PROCEDURE SCREENING ORDER (NO ISOLATION) - Swab, Nasopharynx.  Procedure                               Abnormality         Status                     ---------                               -----------         ------                     COVID-19 and FLU A/B PCR...[[id number]]  Normal              Final result                 Please view results for these tests on the individual orders.    COVID-19 and FLU A/B PCR - Swab, Nasopharynx [[id number]]  (Normal) Collected: 05/06/22 1418    Lab Status: Final result Specimen: Swab from Nasopharynx Updated: 05/06/22 1445     COVID19 Not Detected     Influenza A PCR Not Detected     Influenza B PCR Not Detected    Narrative:      Fact sheet for providers: https://www.fda.gov/media/731044/download    Fact sheet for patients: https://www.fda.gov/media/042049/download    Test performed by PCR.          XR Chest 1 View    Result Date: 5/11/2022  DATE OF EXAM: 5/11/2022 6:27 AM  PROCEDURE: XR CHEST 1 VW-  INDICATIONS: follow up pulm fibrosis, infiltrates,  hypoxia. also diuresing; R09.02-Hypoxemia; J18.9-Pneumonia, unspecified organism; R06.02-Shortness of breath; Z76.89-Persons encountering health services in other specified circumstances  COMPARISON: 05/09/2022  TECHNIQUE: Single radiographic AP view of the chest was obtained.  FINDINGS: Heart size stable. No change in appearance of diffuse interstitial lung disease. No new pulmonary abnormality. Costophrenic angles sharp      Impression: Stable diffuse interstitial lung disease compatible with fibrosis  This report was finalized on 5/11/2022 8:43 AM by Shola Lopez.      CT Outside Films    Result Date: 5/11/2022  This procedure was auto-finalized with no dictation required.    CT Outside Films    Result Date: 5/11/2022  This procedure was auto-finalized with no dictation required.    CT Outside Films    Result Date: 5/11/2022  This procedure was auto-finalized with no dictation required.    CT Outside Films    Result Date: 5/11/2022  This procedure was auto-finalized with no dictation required.    CT Outside Films    Result Date: 5/11/2022  This procedure was auto-finalized with no dictation required.    CT Outside Films    Result Date: 5/11/2022  This procedure was auto-finalized with no dictation required.      Results for orders placed in visit on 05/26/21    Echocardiogram Scan      I have reviewed the medications:  Scheduled Meds:budesonide-formoterol, 2 puff, Inhalation, BID - RT  cefTRIAXone, 1 g, Intravenous, Q24H  divalproex, 750 mg, Oral, BID  FLUoxetine, 40 mg, Oral, QAM  heparin (porcine), 5,000 Units, Subcutaneous, Q8H  [START ON 5/13/2022] insulin detemir, 30 Units, Subcutaneous, Daily  insulin lispro, 0-9 Units, Subcutaneous, 4x Daily With Meals & Nightly  Insulin Lispro, 6 Units, Subcutaneous, TID With Meals  ipratropium-albuterol, 3 mL, Nebulization, Q6H While Awake - RT  lisinopril, 5 mg, Oral, Daily  pantoprazole, 40 mg, Oral, Q AM  Pirfenidone, 267 mg, Oral, TID  predniSONE, 60 mg, Oral,  Daily With Breakfast  QUEtiapine, 25 mg, Oral, BID  QUEtiapine, 300 mg, Oral, Nightly  sodium chloride, 10 mL, Intravenous, Q12H  sulfamethoxazole-trimethoprim, 1 tablet, Oral, Once per day on Mon Wed Fri      Continuous Infusions:   PRN Meds:.•  ALPRAZolam  •  benzonatate  •  dextrose  •  dextrose  •  glucagon (human recombinant)  •  guaiFENesin-dextromethorphan  •  ipratropium-albuterol  •  magnesium sulfate **OR** magnesium sulfate in D5W 1g/100mL (PREMIX) **OR** magnesium sulfate  •  ondansetron  •  sodium chloride  •  sodium chloride    Assessment/Plan   Assessment & Plan     Active Hospital Problems    Diagnosis  POA   • **Acute on chronic respiratory failure with hypoxia (HCC) [J96.21]  Unknown   • Pulmonary fibrosis, presumed IPF currently on antifibrotic and 8 L nasal cannula oxygen at home [J84.10]  Unknown   • Abnormal chest x-ray, rule out pneumonia versus IPF exacerbation versus [R93.89]  Unknown   • Type 2 diabetes mellitus, without long-term current use of insulin (HCC) [E11.9]  Yes   • PVD (peripheral vascular disease) with claudication (formerly [facility]) [I73.9]  Yes   • Essential hypertension [I10]  Yes      Resolved Hospital Problems   No resolved problems to display.        Brief Hospital Course to date:  Melchor Marie is a 51 y.o. male with what sounds like UIP/IPF followed by Dr. Padilla in [place], [place] from pulmonary standpoint. Also w/ hx of insulin dep DM, htn, anxiety/depression, pseudoseizures (on xanax and depakote) who had previously been seen at [facility] clinic back in 2017 and 2018, and at that time stopped following up regularly at that time as he did not feel he required a lung transplant at that time. Patient has had increasing hypoxia, more recently reporting using 6-8L supplemental oxygen at home. Patient presented to [facility] on 5/5/22 and had a ct scan of the chest performed, reportedly was discharged with antibiotics but felt worse so saw  "PCP and was referred to [facility] ED where patient was profoundly hypoxic. The outside CT chest revealed diffuse ground glass opacities, honeycombing. Patient was admitted to hospitalist service, Pulmonary service consulted, and was initiated on empiric antibiotics, bronchodilators, and received 3 days of \"pulse\" steroids (completed 3 days of solumedrol 500mg iv bid) and was subsequently transitioned to prednisone 60mg daily on 5/11/22 w/ plans for a slow taper. Patient also completed 7 days of empiric rocephin & z-max on 5/12/22, cultures/micro was no growth, and covid 19 pcr negative, strep urine ag negative, legionella urine ag was negative. Patient has received intermittent diuresis to \"keep on the dry side\" during this stay (w/ bumex and diamox at different times). Despite these aggressive measures, patient is unable to be weaned from hi flow canula, currently ranging between 55-65% fio2 (has required 80% at times).   Dr. Wright (Pulmonary) has spoken with [facility] service numerous times during this admission regarding this patient, discussing transfer to [facility] for consideration of transplantation. On 5/12/22 the patient was accepted to [facility] service, Dr. Ashraf, after discussing case with Dr. Gary Deng through [facility]'s, now awaiting on a bed.      Acute on chronic, hypoxic respiratory failure (currently requiring hi flow canula, usually 6-8 liters nasal canula)  Progressive Pulmonary Fibrosis, presumed IPF  Bilateral Infiltrates   Uncontrolled insulin dep DM (a1c 11.7 5/8/22)  Hx HTN  Anxiety/Depression  Pseudoseizures    Plan:  -completed 7 days empiric rocephin & z-max (completed @ 5/12/22)  -continue prednisone 60mg daily (initiated 5/11/22; completed 3 days of pulse solumedrol 500mg bid on 5/10/22)  -continue bronchodilators  -prn diuresis  -wean oxygen as tolerates, still requiring 55-70% hi flow, was on 55% fio2 during my visit  -Pulm " following: has arranged for transfer to [facility] transplant service, was accepted today 5/12/22. Discharge med rec complete, summary pended, now awaiting a bed at . Leaving message for case managment.    Am labs: bmp if still here    DVT prophylaxis:  Medical DVT prophylaxis orders are present.       AM-PAC 6 Clicks Score (PT): 18 (05/12/22 0800)    Disposition: I expect the patient to be discharged TBD    CODE STATUS:   Code Status and Medical Interventions:   Ordered at: 05/11/22 1459     Medical Intervention Limits:    NO intubation (DNI)     Level Of Support Discussed With:    Patient     Code Status (Patient has no pulse and is not breathing):    No CPR (Do Not Attempt to Resuscitate)     Medical Interventions (Patient has pulse or is breathing):    Limited Support       Nathan Miranda MD  05/12/22

## (undated) DEVICE — KT ORCA ORCAPOD DISP STRL

## (undated) DEVICE — INTRO ACCSR BLNT TP

## (undated) DEVICE — SYR LUERLOK 50ML

## (undated) DEVICE — THE BITE BLOCK MAXI, LATEX FREE STRAP IS USED TO PROTECT THE ENDOSCOPE INSERTION TUBE FROM BEING BITTEN BY THE PATIENT.

## (undated) DEVICE — SOL IRR H2O BTL 1000ML STRL

## (undated) DEVICE — SUCTION CANISTER, 1000CC,SAFELINER: Brand: DEROYAL

## (undated) DEVICE — TUBING, SUCTION, 1/4" X 10', STRAIGHT: Brand: MEDLINE

## (undated) DEVICE — SPNG VERSALON 4X4 4PLY NONSTRL LF BG/200

## (undated) DEVICE — LUBE GEL ENDOGLIDE 1.1OZ

## (undated) DEVICE — CONTN GRAD MEAS TRIANG 32OZ BLK

## (undated) DEVICE — SINGLE-USE BIOPSY FORCEPS: Brand: RADIAL JAW 4

## (undated) DEVICE — HYBRID CO2 TUBING/CAP SET FOR OLYMPUS® SCOPES & CO2 SOURCE: Brand: ERBE